# Patient Record
Sex: FEMALE | Race: WHITE | NOT HISPANIC OR LATINO | Employment: OTHER | URBAN - METROPOLITAN AREA
[De-identification: names, ages, dates, MRNs, and addresses within clinical notes are randomized per-mention and may not be internally consistent; named-entity substitution may affect disease eponyms.]

---

## 2017-10-11 ENCOUNTER — TRANSFERRED RECORDS (OUTPATIENT)
Dept: HEALTH INFORMATION MANAGEMENT | Facility: CLINIC | Age: 77
End: 2017-10-11

## 2017-10-11 ENCOUNTER — MEDICAL CORRESPONDENCE (OUTPATIENT)
Dept: HEALTH INFORMATION MANAGEMENT | Facility: CLINIC | Age: 77
End: 2017-10-11

## 2022-09-01 ENCOUNTER — OFFICE VISIT (OUTPATIENT)
Dept: FAMILY MEDICINE | Facility: CLINIC | Age: 82
End: 2022-09-01
Payer: COMMERCIAL

## 2022-09-01 VITALS — DIASTOLIC BLOOD PRESSURE: 66 MMHG | HEART RATE: 71 BPM | SYSTOLIC BLOOD PRESSURE: 118 MMHG | OXYGEN SATURATION: 94 %

## 2022-09-01 DIAGNOSIS — C55 ADENOCARCINOMA OF UTERUS (H): ICD-10-CM

## 2022-09-01 DIAGNOSIS — M48.061 SPINAL STENOSIS OF LUMBAR REGION, UNSPECIFIED WHETHER NEUROGENIC CLAUDICATION PRESENT: ICD-10-CM

## 2022-09-01 DIAGNOSIS — G89.29 CHRONIC MIDLINE LOW BACK PAIN WITH LEFT-SIDED SCIATICA: ICD-10-CM

## 2022-09-01 DIAGNOSIS — Z00.00 ENCOUNTER FOR MEDICAL EXAMINATION TO ESTABLISH CARE: Primary | ICD-10-CM

## 2022-09-01 DIAGNOSIS — E78.5 HYPERLIPIDEMIA LDL GOAL <130: ICD-10-CM

## 2022-09-01 DIAGNOSIS — G60.9 IDIOPATHIC PERIPHERAL NEUROPATHY: ICD-10-CM

## 2022-09-01 DIAGNOSIS — M62.838 MUSCLE SPASM: ICD-10-CM

## 2022-09-01 DIAGNOSIS — Z86.2 HISTORY OF ANEMIA: ICD-10-CM

## 2022-09-01 DIAGNOSIS — M54.42 CHRONIC MIDLINE LOW BACK PAIN WITH LEFT-SIDED SCIATICA: ICD-10-CM

## 2022-09-01 DIAGNOSIS — E03.9 HYPOTHYROIDISM, UNSPECIFIED TYPE: ICD-10-CM

## 2022-09-01 LAB
ALBUMIN SERPL BCG-MCNC: 3.7 G/DL (ref 3.5–5.2)
ALP SERPL-CCNC: 71 U/L (ref 35–104)
ALT SERPL W P-5'-P-CCNC: 12 U/L (ref 10–35)
ANION GAP SERPL CALCULATED.3IONS-SCNC: 9 MMOL/L (ref 7–15)
AST SERPL W P-5'-P-CCNC: 18 U/L (ref 10–35)
BILIRUB SERPL-MCNC: 0.2 MG/DL
BUN SERPL-MCNC: 20.1 MG/DL (ref 8–23)
CALCIUM SERPL-MCNC: 9.6 MG/DL (ref 8.8–10.2)
CHLORIDE SERPL-SCNC: 103 MMOL/L (ref 98–107)
CHOLEST SERPL-MCNC: 161 MG/DL
CREAT SERPL-MCNC: 0.98 MG/DL (ref 0.51–0.95)
DEPRECATED HCO3 PLAS-SCNC: 29 MMOL/L (ref 22–29)
ERYTHROCYTE [DISTWIDTH] IN BLOOD BY AUTOMATED COUNT: 12.6 % (ref 10–15)
GFR SERPL CREATININE-BSD FRML MDRD: 58 ML/MIN/1.73M2
GLUCOSE SERPL-MCNC: 106 MG/DL (ref 70–99)
HCT VFR BLD AUTO: 40.3 % (ref 35–47)
HDLC SERPL-MCNC: 54 MG/DL
HGB BLD-MCNC: 13.2 G/DL (ref 11.7–15.7)
LDLC SERPL CALC-MCNC: 76 MG/DL
MCH RBC QN AUTO: 33.2 PG (ref 26.5–33)
MCHC RBC AUTO-ENTMCNC: 32.8 G/DL (ref 31.5–36.5)
MCV RBC AUTO: 102 FL (ref 78–100)
NONHDLC SERPL-MCNC: 107 MG/DL
PLATELET # BLD AUTO: 185 10E3/UL (ref 150–450)
POTASSIUM SERPL-SCNC: 3.7 MMOL/L (ref 3.4–5.3)
PROT SERPL-MCNC: 6.4 G/DL (ref 6.4–8.3)
RBC # BLD AUTO: 3.97 10E6/UL (ref 3.8–5.2)
SODIUM SERPL-SCNC: 141 MMOL/L (ref 136–145)
TRIGL SERPL-MCNC: 154 MG/DL
TSH SERPL DL<=0.005 MIU/L-ACNC: 0.04 UIU/ML (ref 0.3–4.2)
WBC # BLD AUTO: 14 10E3/UL (ref 4–11)

## 2022-09-01 PROCEDURE — 80061 LIPID PANEL: CPT

## 2022-09-01 PROCEDURE — 99204 OFFICE O/P NEW MOD 45 MIN: CPT | Mod: GC

## 2022-09-01 PROCEDURE — 80053 COMPREHEN METABOLIC PANEL: CPT

## 2022-09-01 PROCEDURE — 85027 COMPLETE CBC AUTOMATED: CPT

## 2022-09-01 PROCEDURE — 84439 ASSAY OF FREE THYROXINE: CPT

## 2022-09-01 PROCEDURE — 84443 ASSAY THYROID STIM HORMONE: CPT

## 2022-09-01 PROCEDURE — 36415 COLL VENOUS BLD VENIPUNCTURE: CPT

## 2022-09-01 RX ORDER — LEVOTHYROXINE SODIUM 137 UG/1
137 TABLET ORAL DAILY
Qty: 30 TABLET | Refills: 1 | Status: SHIPPED | OUTPATIENT
Start: 2022-09-01 | End: 2022-11-03 | Stop reason: SINTOL

## 2022-09-01 RX ORDER — PRAVASTATIN SODIUM 80 MG/1
80 TABLET ORAL DAILY
Qty: 90 TABLET | Refills: 0 | Status: SHIPPED | OUTPATIENT
Start: 2022-09-01 | End: 2022-11-30

## 2022-09-01 RX ORDER — GABAPENTIN 300 MG/1
600 CAPSULE ORAL 2 TIMES DAILY
Qty: 120 CAPSULE | Refills: 1 | Status: SHIPPED | OUTPATIENT
Start: 2022-09-01 | End: 2022-11-03

## 2022-09-01 RX ORDER — HYDROCODONE BITARTRATE AND ACETAMINOPHEN 5; 325 MG/1; MG/1
1 TABLET ORAL 2 TIMES DAILY
Qty: 60 TABLET | Refills: 0 | Status: SHIPPED | OUTPATIENT
Start: 2022-09-01 | End: 2022-09-30

## 2022-09-01 NOTE — PROGRESS NOTES
Assessment & Plan   Zena is a 81 year old with a history significant for hypertension, hyperlipidemia, type 2 diabetes mellitus, adenocarcinoma of the uterus, spinal stenosis, chronic back pain, chronic opioid use.  Here to establish care.    Encounter for medical examination to establish care  Recently moved to [place].  Here to establish care.  Initially without any medical records in the chart.  Were able to find limited records through care everywhere.  Chronic medical conditions as below.  We will have to prescribe all her chronic medications as she is running out. History updated in the chart.  - Comprehensive metabolic panel  -Follow-up in 3 to 4 weeks for pain contract  -AUST[facility] filled unchanged.  -Continue to address chronic medical conditions once outside records received    Hypothyroidism, unspecified type  Unclear etiology.  Currently on Synthroid 137 mg daily.  No signs or symptoms of hyper/hypothyroidism.  Most recent thyroid studies were normal.  We will repeat today.  We will fill levothyroxine today and adjust dose as needed pending results.  - levothyroxine (SYNTHROID/LEVOTHROID) 137 MCG tablet  Dispense: 30 tablet; Refill: 1  - TSH with free T4 reflex  - T4 free    Spinal stenosis of lumbar region, unspecified whether neurogenic claudication present  Chronic midline low back pain with left-sided sciatica  Ongoing low back pain with sciatica to the left lower extremity.  Per chart review, history of spinal stenosis.  Unable to see any imaging or prior interventions.  Symptoms have been stable.  No red flag signs or symptoms.  Has been taking Norco 5-325 2 times daily for almost 20 years.  We will refill this today, however consider looking into other therapy options.  We will have her return to clinic for a pain contract.  - HYDROcodone-acetaminophen (NORCO) 5-325 MG tablet  Dispense: 60 tablet; Refill: 0  -Follow-up in 3 to 4 weeks to establish pain contract    Muscle spasm  Occasional muscle  spasms.  Using previously prescribed Zanaflex as needed.  Will order today.  - tiZANidine (ZANAFLEX) 4 MG tablet  Dispense: 30 tablet; Refill: 1    Idiopathic peripheral neuropathy  Ongoing lower extremity neuropathy.  Burning sensation.  Unable to find a clear etiology in the chart.  Does have some benefit with gabapentin 600 mg 2 times daily.  Will refill today.    - gabapentin (NEURONTIN) 300 MG capsule  Dispense: 120 capsule; Refill: 1    Hyperlipidemia LDL goal <130  Per chart review.  Prior lipid panel on 2/23/2022 with a total cholesterol of 174, HDL 56, LDL 84.  On pravastatin 80 mg daily.  Denies any history of CVA or CAD.  Will prescribe pravastatin and repeat lipid panel.  - pravastatin (PRAVACHOL) 80 MG tablet  Dispense: 90 tablet; Refill: 0  - Lipid Profile      History of anemia  Reports history of anemia.  Most recent CBC with normal hemoglobin.  Prior iron studies in the chart were normal.  No evidence of bleeding.  We will obtain a CBC today to evaluate.  - CBC with platelets    Adenocarcinoma of the uterus   Diagnosed about 5 years ago.  Status post hysterectomy per patient.  Did have regular follow-up, no recurrence of cancer.         No follow-ups on file.    Jamie Ritchie MD  [facility] Subjective Betty is a 81 year old with a history significant for hypertension tension, hyperlipidemia, type 2 diabetes mellitus, adenocarcinoma of the uterus, spinal stenosis, chronic back pain presenting for the following health issues:  New Patient (Overview moved back from [facility]//Complete hysterectomy June 17 2018/Neuropathy both feet/Knee surgery about 20 years ago) and Refill Request (Refill 4-5 meds Gabapentin 300mg, Pravastatin 80mg, Levohydroxezine, Hydrocodone, Tenzadine)      HPI   81-year-old female presents to establish.  Previously lived in [place] many years ago.  Lived in [place] for almost 40 years, returned home to be closer to family.  Received care at  [facility] in [place].      Hypothyroidism  Most recent thyroid studies on February 24, 2022 showing TSH of 0.026 and free T4 of 1.78    Adenocarcinoma of the uterus  Status post hysterectomy.  Reports she has been cancer free for 5 years.  Denies any need for further follow-up    Hyperlipidemia  Lipid panel on 2/23/2022 with a total cholesterol of 174, HDL 56, LDL 84.  Takes pravastatin 80 mg daily.  No history of heart disease or CVA.    History of anemia  Normal hemoglobin on 7/23/2022.  Normal iron studies.  She does not know the etiology of her anemia.    Chronic kidney disease stage III  Normal creatinine on February 23, 2022    Spinal stenosis of lumbar region  Long history of back pain.  Per chart review, diagnosed as lumbar spinal stenosis.  No imaging available.  Has been on hydrocodone for the past 20 years she reports.    Sciatic nerve pain   Left groin to anterior leg. Burning sensation. Has tried physical therapy without relief.  Able to ambulate without difficulty.        Past medical history  Hypertension  Hyperlipidemia  Chronic kidney disease stage III  Adenocarcinoma of the uterus  Lumbar spinal stenosis  Type 2 diabetes mellitus  Lower extremity neuropathy of unknown etiology      Social history  Previous smoker -50 pack year        Current medications per patient  Gabapentin neuropathy - 600am and 600 at night.   Pravastatin 80mg daily   Levothyroxine - 137mcg daily   Hydrocodone acetaminophen 5-325mg two times daily. - back pain   tezanidine 4mg as needed      Review of Systems   Constitutional, HEENT, cardiovascular, pulmonary, GI, , musculoskeletal, neuro, skin, endocrine and psych systems are negative, except as otherwise noted.      Objective    /66   Pulse 71   SpO2 94%   There is no height or weight on file to calculate BMI.  Physical Exam   GENERAL: healthy, alert and no distress  HEENT -mucous membranes moist.  No oral lesions.  No dentition.  Small excoriation  on the left outer ear canal.  No bleeding or drainage.  NECK: no adenopathy, no asymmetry, masses, and thyroid normal to palpation  RESP: lungs clear to auscultation - no rales, rhonchi or wheezes  CV: regular rate and rhythm, normal S1 S2, no S3 or S4, no murmur, click or rub, no peripheral edema and peripheral pulses strong  ABDOMEN: soft, nontender, no hepatosplenomegaly, no masses and bowel sounds normal  MS: no gross musculoskeletal defects noted, no edema

## 2022-09-01 NOTE — LETTER
September 8, 2022      Zena Emmanuel  [address] [place] [address]      Hi Zena,     I tried to call your phone, however your number was not in service.  You can call the clinic so we have your most up-to-date phone number.     Most of your blood levels are normal.  Your white blood cell count is slightly elevated at 14.  This can suggest underlying infection or inflammation.  You were not having any symptoms when I saw you, but please follow-up in clinic if you develop any symptoms of an infection.  We can recheck this at a later time.     Your hemoglobin was normal.  You do not have anemia.  Your red blood cells are little bit on the larger side, we can talk about this more at your upcoming visit.     Your thyroid levels are a little on the higher side.  Your TSH, or thyroid stimulating hormone, is low.  This means you are probably taking too high of a dose of Synthroid.  I would recommend calling to schedule appointment so we can discuss this further and make changes as needed.     Your cholesterol looks good.  Continue taking the pravastatin.     Your kidney function looks good overall.  Your creatinine, which is a measure of your kidney function, is slightly elevated.  Nothing further needs to be done at this time.  We can recheck it later.  Your electrolytes otherwise look good.       Resulted Orders   CBC with platelets   Result Value Ref Range    WBC Count 14.0 (H) 4.0 - 11.0 10e3/uL    RBC Count 3.97 3.80 - 5.20 10e6/uL    Hemoglobin 13.2 11.7 - 15.7 g/dL    Hematocrit 40.3 35.0 - 47.0 %     (H) 78 - 100 fL    MCH 33.2 (H) 26.5 - 33.0 pg    MCHC 32.8 31.5 - 36.5 g/dL    RDW 12.6 10.0 - 15.0 %    Platelet Count 185 150 - 450 10e3/uL   Comprehensive metabolic panel   Result Value Ref Range    Sodium 141 136 - 145 mmol/L    Potassium 3.7 3.4 - 5.3 mmol/L    Creatinine 0.98 (H) 0.51 - 0.95 mg/dL    Urea Nitrogen 20.1 8.0 - 23.0 mg/dL    Chloride 103 98 - 107 mmol/L    Carbon Dioxide (CO2)  29 22 - 29 mmol/L    Anion Gap 9 7 - 15 mmol/L    Glucose 106 (H) 70 - 99 mg/dL    Calcium 9.6 8.8 - 10.2 mg/dL    Protein Total 6.4 6.4 - 8.3 g/dL    Albumin 3.7 3.5 - 5.2 g/dL    Bilirubin Total 0.2 <=1.2 mg/dL    Alkaline Phosphatase 71 35 - 104 U/L    AST 18 10 - 35 U/L    ALT 12 10 - 35 U/L    GFR Estimate 58 (L) >60 mL/min/1.73m2      Comment:      Effective December 21, 2021 eGFRcr in adults is calculated using the 2021 CKD-EPI creatinine equation which includes age and gender (Trenton et al., NEJM, DOI: 10.1056/EQAPgv2914370)   Lipid Profile   Result Value Ref Range    Cholesterol 161 <200 mg/dL    Triglycerides 154 (H) <150 mg/dL    Direct Measure HDL 54 >=50 mg/dL    LDL Cholesterol Calculated 76 <=100 mg/dL    Non HDL Cholesterol 107 <130 mg/dL    Narrative    Cholesterol  Desirable:  <200 mg/dL    Triglycerides  Normal:  Less than 150 mg/dL  Borderline High:  150-199 mg/dL  High:  200-499 mg/dL  Very High:  Greater than or equal to 500 mg/dL    Direct Measure HDL  Female:  Greater than or equal to 50 mg/dL   Male:  Greater than or equal to 40 mg/dL    LDL Cholesterol  Desirable:  <100mg/dL  Above Desirable:  100-129 mg/dL   Borderline High:  130-159 mg/dL   High:  160-189 mg/dL   Very High:  >= 190 mg/dL    Non HDL Cholesterol  Desirable:  130 mg/dL  Above Desirable:  130-159 mg/dL  Borderline High:  160-189 mg/dL  High:  190-219 mg/dL  Very High:  Greater than or equal to 220 mg/dL   TSH with free T4 reflex   Result Value Ref Range    TSH 0.04 (L) 0.30 - 4.20 uIU/mL   T4 free   Result Value Ref Range    Free T4 1.67 0.90 - 1.70 ng/dL       Please call the clinic with any questions or concerns.     Thank you,     Dr. Ritchie

## 2022-09-02 PROBLEM — E11.9 TYPE 2 DIABETES MELLITUS (H): Status: ACTIVE | Noted: 2019-06-11

## 2022-09-02 PROBLEM — Z86.69 HISTORY OF CATARACT: Status: ACTIVE | Noted: 2020-02-26

## 2022-09-02 PROBLEM — N64.52 BLOODY DISCHARGE FROM RIGHT NIPPLE: Status: ACTIVE | Noted: 2018-06-27

## 2022-09-02 PROBLEM — M54.30 SCIATICA: Status: ACTIVE | Noted: 2017-12-19

## 2022-09-02 PROBLEM — M25.50 PAIN IN JOINT: Status: ACTIVE | Noted: 2018-10-09

## 2022-09-02 PROBLEM — E55.9 VITAMIN D DEFICIENCY: Status: ACTIVE | Noted: 2017-09-20

## 2022-09-02 PROBLEM — C55 ADENOCARCINOMA OF UTERUS (H): Status: ACTIVE | Noted: 2018-06-27

## 2022-09-02 PROBLEM — N18.30 CHRONIC KIDNEY DISEASE, STAGE III (MODERATE) (H): Status: ACTIVE | Noted: 2018-10-09

## 2022-09-02 LAB — T4 FREE SERPL-MCNC: 1.67 NG/DL (ref 0.9–1.7)

## 2022-09-08 NOTE — PROGRESS NOTES
Preceptor Attestation:   Patient seen, evaluated and discussed with the resident. I have verified the content of the note, which accurately reflects my assessment of the patient and the plan of care.  Supervising Physician:Morena Dillard DO [facility]

## 2022-09-30 ENCOUNTER — OFFICE VISIT (OUTPATIENT)
Dept: FAMILY MEDICINE | Facility: CLINIC | Age: 82
End: 2022-09-30
Payer: COMMERCIAL

## 2022-09-30 VITALS
DIASTOLIC BLOOD PRESSURE: 75 MMHG | SYSTOLIC BLOOD PRESSURE: 126 MMHG | BODY MASS INDEX: 40.62 KG/M2 | WEIGHT: 268 LBS | TEMPERATURE: 98.1 F | HEART RATE: 71 BPM | HEIGHT: 68 IN | OXYGEN SATURATION: 95 % | RESPIRATION RATE: 20 BRPM

## 2022-09-30 DIAGNOSIS — M54.42 CHRONIC MIDLINE LOW BACK PAIN WITH LEFT-SIDED SCIATICA: Primary | ICD-10-CM

## 2022-09-30 DIAGNOSIS — D72.829 LEUKOCYTOSIS, UNSPECIFIED TYPE: ICD-10-CM

## 2022-09-30 DIAGNOSIS — M62.838 MUSCLE SPASM: ICD-10-CM

## 2022-09-30 DIAGNOSIS — F11.90 CHRONIC, CONTINUOUS USE OF OPIOIDS: ICD-10-CM

## 2022-09-30 DIAGNOSIS — G89.29 CHRONIC MIDLINE LOW BACK PAIN WITH LEFT-SIDED SCIATICA: Primary | ICD-10-CM

## 2022-09-30 DIAGNOSIS — E66.01 MORBID OBESITY (H): ICD-10-CM

## 2022-09-30 LAB
BASOPHILS # BLD AUTO: 0 10E3/UL (ref 0–0.2)
BASOPHILS NFR BLD AUTO: 0 %
EOSINOPHIL # BLD AUTO: 0 10E3/UL (ref 0–0.7)
EOSINOPHIL NFR BLD AUTO: 0 %
ERYTHROCYTE [DISTWIDTH] IN BLOOD BY AUTOMATED COUNT: 13.1 % (ref 10–15)
HCT VFR BLD AUTO: 36.7 % (ref 35–47)
HGB BLD-MCNC: 12.2 G/DL (ref 11.7–15.7)
IMM GRANULOCYTES # BLD: 0 10E3/UL
IMM GRANULOCYTES NFR BLD: 0 %
LYMPHOCYTES # BLD AUTO: 3.7 10E3/UL (ref 0.8–5.3)
LYMPHOCYTES NFR BLD AUTO: 43 %
MCH RBC QN AUTO: 33.5 PG (ref 26.5–33)
MCHC RBC AUTO-ENTMCNC: 33.2 G/DL (ref 31.5–36.5)
MCV RBC AUTO: 101 FL (ref 78–100)
MONOCYTES # BLD AUTO: 1.7 10E3/UL (ref 0–1.3)
MONOCYTES NFR BLD AUTO: 19 %
NEUTROPHILS # BLD AUTO: 3.2 10E3/UL (ref 1.6–8.3)
NEUTROPHILS NFR BLD AUTO: 37 %
PLATELET # BLD AUTO: 180 10E3/UL (ref 150–450)
RBC # BLD AUTO: 3.64 10E6/UL (ref 3.8–5.2)
WBC # BLD AUTO: 8.6 10E3/UL (ref 4–11)

## 2022-09-30 PROCEDURE — 99214 OFFICE O/P EST MOD 30 MIN: CPT | Mod: GC

## 2022-09-30 PROCEDURE — 36415 COLL VENOUS BLD VENIPUNCTURE: CPT

## 2022-09-30 PROCEDURE — 85025 COMPLETE CBC W/AUTO DIFF WBC: CPT

## 2022-09-30 RX ORDER — HYDROCODONE BITARTRATE AND ACETAMINOPHEN 5; 325 MG/1; MG/1
1 TABLET ORAL 2 TIMES DAILY
Qty: 60 TABLET | Refills: 0 | Status: SHIPPED | OUTPATIENT
Start: 2022-09-30 | End: 2022-11-03

## 2022-09-30 RX ORDER — LIDOCAINE 50 MG/G
OINTMENT TOPICAL ONCE
Status: DISCONTINUED | OUTPATIENT
Start: 2022-09-30 | End: 2022-09-30

## 2022-09-30 RX ORDER — LIDOCAINE 50 MG/G
OINTMENT TOPICAL DAILY
Qty: 50 G | Refills: 1 | Status: SHIPPED | OUTPATIENT
Start: 2022-09-30 | End: 2023-01-27

## 2022-09-30 NOTE — PATIENT INSTRUCTIONS
Chronic Pain Management Visit After Visit Summary     Goals of Chronic Pain therapy:   Increase function   Decrease suffering   This process may not relieve pain            OPIOID OVERDOSE SAFETY PLAN  Patients taking prescription opioids are at risk for accidental overdose. Overdose from prescription opioid pain medications is a national epidemic. Opioids include: Vicodin (hydrocodone), OxyContin (oxycodone), Dilaudid (hydromorphone), MS Contin (morphine), Fentanyl, Percocet, Methadone, Suboxone, heroin, and others.    Steps to Avoid Overdose  1. Only take medication prescribed to you  2. Don t take more medication than instructed  3. NEVER mix pain medications with alcohol  4. Avoid sleeping pills when taking pain medications  5. Dispose of unused medications  6. Store your medication in a secure place  7. Teach your family and friends how to respond to an overdose      Narcan is being prescribed as part of your opioid overdose safety plan. Narcan is a medication that reverses opioid overdose and saves lives. Opioid overdoses are life threatening and must be handled right away. Narcan reverses overdose for 30-90 minutes, and you must call 911 immediately if you suspect overdose.     STEP 1: RECOGNIZE OVERDOSE  Not breathing or breathing very slowly (less than 1 breath every 5 seconds)  Snoring, gasping, or gurgling sounds  Lips or fingertips turning blue  Very limp body and pale face  Not responding to hard rub of the chest or yelling their name     STEP 2: CALL FOR HELP (DIAL 911)  Always call 911 and tell them  someone is not breathing   You are legally protected when calling for help in [place]     STEP 3: SUPPORT BREATHING  1. Check airway - make sure there is nothing inside their mouth stopping breathing  2. One hand on chin, tilt head back, pinch nose closed  3. Make a seal over mouth and give 2 slow breaths. You should see the chest rise, not stomach.  4. Keep going with one breath every 5 seconds      STEP 4: GIVE NARCAN  Give Narcan if you can give it quickly enough so that the person won t go for too long without your breathing assistance  Follow directions on the package  Spray Narcan into one nostril     STEP 5: MONITOR  Continue rescue breathing until they are breathing on their own  Give another Narcan spray if they are not breathing on their own or still unresponsive within 3 minutes of the first spray  Narcan wears off within 30-90 minutes and the person can overdose again once it wears off because the opioids are still in their system! Be sure to get them medical care right away.      Follow up in 2-4 weeks.    Important Reminders about Opioid Medications  Opioid Medications are only prescribed during a clinic visit  Keep them locked up-we will not replace lost or stolen opioid medications   Bring all your opioid medications in their original bottles to each appointment   Thank you for coming to Clinic today.  Lab Testing:  f you had lab testing today and your results are reassuring or normal they will be mailed to you or sent through [employer] within 7 days.   If the lab tests need quick action we will call you with the results.  The phone number we will call with results is # [phone] (home) . If this is not the best number please call our clinic and change the number.   Medication Refills:  If you need any refills please call your pharmacy and they will contact us. Opioid refills will only be refilled at visits, any lost opioid prescriptions will not be replaced.  If you need to  your refill at a new pharmacy, please contact the new pharmacy directly. The new pharmacy will help you get your medications transferred faster.   Scheduling:  If you have any concerns about today's visit or wish to schedule another appointment please call our office during normal business hours   (8-5:00 M-F)    Medical Concerns:  If you have urgent medical concerns please call [phone] at any time of the  day.  If you have a medical emergency please call 911.  Again thank you for choosing [facility] [address] [place] [id number] [phone]  and please let us know how we can best partner with you to improve you and your family's health.

## 2022-09-30 NOTE — LETTER
Opioid / Opioid Plus Controlled Substance Agreement    This is an agreement between you and your provider about the safe and appropriate use of controlled substance/opioids prescribed by your care team. Controlled substances are medicines that can cause physical and mental dependence (abuse).    There are strict laws about having and using these medicines. We here at [facility] are committing to working with you in your efforts to get better. To support you in this work, we ll help you schedule regular office appointments for medicine refills. If we must cancel or change your appointment for any reason, we ll make sure you have enough medicine to last until your next appointment.     As a Provider, I will:    Listen carefully to your concerns and treat you with respect.     Recommend a treatment plan that I believe is in your best interest. This plan may involve therapies other than opioid pain medication.     Talk with you often about the possible benefits, and the risk of harm of any medicine that we prescribe for you.     Provide a plan on how to taper (discontinue or go off) using this medicine if the decision is made to stop its use.    As a Patient, I understand that opioid(s):     Are a controlled substance prescribed by my care team to help me function or work and manage my condition(s).     Are strong medicines and can cause serious side effects such as:    Drowsiness, which can seriously affect my driving ability    A lower breathing rate, enough to cause death    Harm to my thinking ability     Depression     Abuse of and addiction to this medicine    Need to be taken exactly as prescribed. Combining opioids with certain medicines or chemicals (such as illegal drugs, sedatives, sleeping pills, and benzodiazepines) can be dangerous or even fatal. If I stop opioids suddenly, I may have severe withdrawal symptoms.    Do not work for all types of pain nor for all patients. If they re not helpful, I may  be asked to stop them.        The risks, benefits and side effects of these medicine(s) were explained to me. I agree that:  1. I will take part in other treatments as advised by my care team. This may be psychiatry or counseling, physical therapy, behavioral therapy, group treatment or a referral to a specialist.     2. I will keep all my appointments. I understand that this is part of the monitoring of opioids. My care team may require an office visit for EVERY opioid/controlled substance refill. If I miss appointments or don t follow instructions, my care team may stop my medicine.    3. I will take my medicines as prescribed. I will not change the dose or schedule unless my care team tells me to. There will be no refills if I run out early.     4. I may be asked to come to the clinic and complete a urine drug test or complete a pill count at any time. If I don t give a urine sample or participate in a pill count, the care team may stop my medicine.    5. I will only receive prescriptions from this clinic for chronic pain. If I am treated by another provider for acute pain issues, I will tell them that I am taking opioid pain medication for chronic pain and that I have a treatment agreement with this provider. I will inform my [facility] care team within one business day if I am given a prescription for any pain medication by another healthcare provider. My [facility] care team can contact other providers and pharmacists about my use of any medicines.    6. It is up to me to make sure that I don t run out of my medicines on weekends or holidays. If my care team is willing to refill my opioid prescription without a visit, I must request refills only during office hours. Refills may take up to 3 business days to process. I will use one pharmacy to fill all my opioid and other controlled substance prescriptions. I will notify the clinic about any changes to my insurance or medication  availability.    7. I am responsible for my prescriptions. If the medicine/prescription is lost, stolen or destroyed, it will not be replaced. I also agree not to share controlled substance medicines with anyone.    8. I am aware I should not use any illegal or recreational drugs. I agree not to drink alcohol unless my care team says I can.       9. If I enroll in the [place] Medical Cannabis program, I will tell my care team prior to my next refill.     10. I will tell my care team right away if I become pregnant, have a new medical problem treated outside of my regular clinic, or have a change in my medications.    11. I understand that this medicine can affect my thinking, judgment and reaction time. Alcohol and drugs affect the brain and body, which can affect the safety of my driving. Being under the influence of alcohol or drugs can affect my decision-making, behaviors, personal safety, and the safety of others. Driving while impaired (DWI) can occur if a person is driving, operating, or in physical control of a car, motorcycle, boat, snowmobile, ATV, motorbike, off-road vehicle, or any other motor vehicle ([place] Statute 169A.20). I understand the risk if I choose to drive or operate any vehicle or machinery.    I understand that if I do not follow any of the conditions above, my prescriptions or treatment may be stopped or changed.          Opioids  What You Need to Know    What are opioids?   Opioids are pain medicines that must be prescribed by a doctor. They are also known as narcotics.     Examples are:   1. morphine (MS Contin, Soledad)  2. oxycodone (Oxycontin)  3. oxycodone and acetaminophen (Percocet)  4. hydrocodone and acetaminophen (Vicodin, Norco)   5. fentanyl patch (Duragesic)   6. hydromorphone (Dilaudid)   7. methadone  8. codeine (Tylenol #3)     What do opioids do well?   Opioids are best for severe short-term pain such as after a surgery or injury. They may work well for cancer pain. They may  help some people with long-lasting (chronic) pain.     What do opioids NOT do well?   Opioids never get rid of pain entirely, and they don t work well for most patients with chronic pain. Opioids don t reduce swelling, one of the causes of pain.                                    Other ways to manage chronic pain and improve function include:       Treat the health problem that may be causing pain    Anti-inflammation medicines, which reduce swelling and tenderness, such as ibuprofen (Advil, Motrin) or naproxen (Aleve)    Acetaminophen (Tylenol)    Antidepressants and anti-seizure medicines, especially for nerve pain    Topical treatments such as patches or creams    Injections or nerve blocks    Chiropractic or osteopathic treatment    Acupuncture, massage, deep breathing, meditation, visual imagery, aromatherapy    Use heat or ice at the pain site    Physical therapy     Exercise    Stop smoking    Take part in therapy       Risks and side effects     Talk to your doctor before you start or decide to keep taking opioids. Possible side effects include:      Lowering your breathing rate enough to cause death    Overdose, including death, especially if taking higher than prescribed doses    Worse depression symptoms; less pleasure in things you usually enjoy    Feeling tired or sluggish    Slower thoughts or cloudy thinking    Being more sensitive to pain over time; pain is harder to control    Trouble sleeping or restless sleep    Changes in hormone levels (for example, less testosterone)    Changes in sex drive or ability to have sex    Constipation    Unsafe driving    Itching and sweating    Dizziness    Nausea, throwing up and dry mouth    What else should I know about opioids?    Opioids may lead to dependence, tolerance, or addiction.      Dependence means that if you stop or reduce the medicine too quickly, you will have withdrawal symptoms. These include loose poop (diarrhea), jitters, flu-like symptoms,  nervousness and tremors. Dependence is not the same as addiction.                       Tolerance means needing higher doses over time to get the same effect. This may increase the chance of serious side effects.      Addiction is when people improperly use a substance that harms their body, their mind or their relations with others. Use of opiates can cause a relapse of addiction if you have a history of drug or alcohol abuse.      People who have used opioids for a long time may have a lower quality of life, worse depression, higher levels of pain and more visits to doctors.    You can overdose on opioids. Take these steps to lower your risk of overdose:    1. Recognize the signs:  Signs of overdose include decrease or loss of consciousness (blackout), slowed breathing, trouble waking up and blue lips. If someone is worried about overdose, they should call 911.    2. Talk to your doctor about Narcan (naloxone).   If you are at risk for overdose, you may be given a prescription for Narcan. This medicine very quickly reverses the effects of opioids.   If you overdose, a friend or family member can give you Narcan while waiting for the ambulance. They need to know the signs of overdose and how to give Narcan.     3. Don't use alcohol or street drugs.   Taking them with opioids can cause death.    4. Do not take any of these medicines unless your doctor says it s OK. Taking these with opioids can cause death:    Benzodiazepines, such as lorazepam (Ativan), alprazolam (Xanax) or diazepam (Valium)    Muscle relaxers, such as cyclobenzaprine (Flexeril)    Sleeping pills like zolpidem (Ambien)     Other opioids      How to keep you and other people safe while taking opioids:    1. Never share your opioids with others.  Opioid medicines are regulated by the Drug Enforcement Agency ([place]). Selling or sharing medications is a criminal act.    2. Be sure to store opioids in a secure place, locked up if possible. Young children  can easily swallow them and overdose.    3. When you are traveling with your medicines, keep them in the original bottles. If you use a pill box, be sure you also carry a copy of your medicine list from your clinic or pharmacy.    4. Safe disposal of opioids    Most pharmacies have places to get rid of medicine, called disposal kiosks. Medicine disposal options are also available in every [facility]. Search your county and  medication disposal  to find more options. You can find more details at:  [email or web address]     I agree that my provider, clinic care team, and pharmacy may work with any city, state or federal law enforcement agency that investigates the misuse, sale, or other diversion of my controlled medicine. I will allow my provider to discuss my care with, or share a copy of, this agreement with any other treating provider, pharmacy or emergency room where I receive care.    I have read this agreement and have asked questions about anything I did not understand.    _______________________________________________________  Patient Signature - Zena Emmanuel _____________________                   Date     _______________________________________________________  Provider Signature - Jamie Ritchie MD   _____________________                   Date     _______________________________________________________  Witness Signature (required if provider not present while patient signing)   _____________________                   Date

## 2022-09-30 NOTE — PROGRESS NOTES
Preceptor Attestation:   Patient seen, evaluated and discussed with the resident Dr. Ritchie. I have verified the content of the note, which accurately reflects my assessment of the patient and the plan of care.    Long term use of opioids prior to establishing with this clinic. Will continue to discuss decreasing/alternatives.     Supervising Physician:Benjamin Rosenstein, MD, MA  [facility]

## 2022-09-30 NOTE — PROGRESS NOTES
Assessment & Plan   Zena is a 81 year old with history sent for hypertension, hyperlipidemia, type 2 diabetes mellitus, adenocarcinoma of the uterus, spinal stenosis, chronic back pain with chronic opioid use., presenting for the following health issues:    Chronic midline low back pain with left-sided sciatica  Ongoing low back pain.  Etiology not completely clear, could be related to spinal stenosis.  Has tried physical therapy in the past, but however willing to try again.  We will also try some topical lidocaine and see if we can get the pain under better control.  Overall goal is to reduce Norco use if able.  Can consider referral to spine clinic if symptoms persist.  We will continue with tizanidine as well, does use it sparingly.  Recommended avoiding administration with Norco due to sedation, which she has experienced in the past.  - tiZANidine (ZANAFLEX) 4 MG tablet  Dispense: 30 tablet; Refill: 1  - Physical Therapy Referral  - HYDROcodone-acetaminophen (NORCO) 5-325 MG tablet  Dispense: 60 tablet; Refill: 0  - lidocaine (XYLOCAINE) 5 % external ointment  Dispense: 50 g; Refill: 1  -Follow-up in 1 month.    Chronic, continuous use of opioids  20-year history of opioid use.  Currently on Norco 5 mg twice daily.  No improvement as above.  Chronic pain contract reviewed and signed by myself and patient today.  Counseled on opioid side effects.  Will order naloxone today.  - naloxone (NARCAN) 4 MG/0.1ML nasal spray  Dispense: 0.2 mL; Refill: 0    Muscle spasm  Will refill today.  Only uses a few times a month.  Counseled on sedating side effects  - tiZANidine (ZANAFLEX) 4 MG tablet  Dispense: 30 tablet; Refill: 1    Leukocytosis, unspecified type  Noted at prior visit.  No evidence of infection at that time.  We will recheck a CBC today with differential.  - CBC with Platelets & Differential      Morbid obesity (H)  Briefly discussed healthy lifestyle modifications.  Elevated BM could be contributing to  chronic low back pain.  Interventions as above.  We will continue to follow          No follow-ups on file.    Jamie Ritchie MD  [facility] [place] Subjective Betty is a 81 year old with history sent for hypertension, hyperlipidemia, type 2 diabetes mellitus, adenocarcinoma of the uterus, spinal stenosis, chronic back pain with chronic opioid use., presenting for the following health issues:  RECHECK (Sciatica issues, pain management issue,)      HPI     Pain History:  When did you first notice your pain? - Chronic Pain   Have you seen this provider for your pain in the past? No   Where in your body do your have pain? Low back.   Are you seeing anyone else for your pain? No  What makes your pain better? Resting  What makes your pain worse? Standing for short periods  How has pain affected your ability to work? Not currently working - unrelated to pain  Who lives in your household? Niece    Chronic Pain - Initial Assessment:    How would you describe your pain? Sharp stabbing pain 10/10.  Worse after standing/walking  Have you had any recent changes to the severity or character of your pain? Has been stable  Is there an underlying cause that has been identified? Spinal stenosis of the lumbar region   Has your ability to work or do daily activities changed recently because of your pain?  Yes, does have difficulty completing ADLs that require standing for periods of time.  Which of these pain treatments have you tried? Activity or exercise, Cold, Heat and Physical Therapy.  Is interested in trying physical therapy again.  Previous medication treatments:  Opiates - hydrocodone (Vicodin/Norco)  Muscle relaxants - tizanidine (Zanaflex)  Topicals - lidocaine (Lidoderm)      Review of Systems   Constitutional, HEENT, cardiovascular, pulmonary, gi and gu systems are negative, except as otherwise noted.      Objective    /75   Pulse 71   Temp 98.1  F (36.7  C) (Oral)   Resp 20   Ht  "1.727 m (5' 8\")   Wt 121.6 kg (268 lb)   SpO2 95%   BMI 40.75 kg/m    Body mass index is 40.75 kg/m .  Physical Exam   GENERAL: healthy, alert and no distress  RESP: Comfortable respiratory effort.  CV: No cyanosis.  Extremities warm and well perfused.  MS: no gross musculoskeletal defects noted, no edema.            "

## 2022-10-02 PROBLEM — E66.01 MORBID OBESITY (H): Status: ACTIVE | Noted: 2022-10-02

## 2022-10-04 DIAGNOSIS — R71.8 ELEVATED MCV: Primary | ICD-10-CM

## 2022-10-23 ENCOUNTER — HEALTH MAINTENANCE LETTER (OUTPATIENT)
Age: 82
End: 2022-10-23

## 2022-10-31 DIAGNOSIS — E03.9 HYPOTHYROIDISM, UNSPECIFIED TYPE: ICD-10-CM

## 2022-10-31 DIAGNOSIS — G60.9 IDIOPATHIC PERIPHERAL NEUROPATHY: ICD-10-CM

## 2022-11-01 NOTE — PROGRESS NOTES
"  Assessment & Plan   Zena is a 81 year old with history significant for hypertension, hyperlipidemia, type 2 diabetes mellitus, adenocarcinoma of the uterus, spinal stenosis, chronic back pain with chronic opioid use., presenting for the following health issues:    Chronic midline low back pain with left-sided sciatica  Chronic pain syndrome  Chronic low back pain with 20+ year history of opioid use.  Symptoms exacerbated by standing, walking for long periods of time.  Using assistive device with ambulation.  Symptoms have been stable.  Referral to physical therapy made, recommended scheduling appointment.  Will check urine drug screen today.  We will also increase his gabapentin with ongoing neuropathy.  - Urine Drugs of Abuse Screen Panel 13  - Urine Drugs of Abuse Screen Panel 13  - gabapentin (NEURONTIN) 300 MG capsule  Dispense: 90 capsule; Refill: 1  - HYDROcodone-acetaminophen (NORCO) 5-325 MG tablet  Dispense: 60 tablet; Refill: 0  -In a Parking sticker paperwork filled out.    Hypothyroidism, unspecified type  History of hypothyroidism.  Most recent TSH in clinic measuring 0.04 with a free T4 of 1.67.  Currently on 137 MCG levothyroxine.  TSH is suggestive of medication.  No signs or symptoms of hyperthyroidism.  We will cut back to 125 MCG with follow-up in 4 to 6 weeks.  - levothyroxine (SYNTHROID/LEVOTHROID) 125 MCG tablet  Dispense: 30 tablet; Refill: 1    Elevated MCV  Noted on CBC.  No history of alcohol use.  Does report history of B12 deficiency, previously requiring injections.  We will check a B12 and folate today.  Supplement as needed.  - Vitamin B12  - Folate         BMI:   Estimated body mass index is 40.6 kg/m  as calculated from the following:    Height as of this encounter: 1.727 m (5' 8\").    Weight as of this encounter: 121.1 kg (267 lb).   Weight management plan: Discussed healthy diet and exercise guidelines      No follow-ups on file.    Jamie Ritchie MD  [facility] " [facility] [place]    Adolfo Freitas is a 81 year old with history sent for hypertension, hyperlipidemia, type 2 diabetes mellitus, adenocarcinoma of the uterus, spinal stenosis, chronic back pain with chronic opioid use., presenting for the following health issues:  RECHECK (Pain medication. Also needs handicap sticker complete too) and Medication Reconciliation (Completed)      HPI       Hypothyroidism  Tsh on the lower side at last check. No signs or symptoms of hyperthyroidism. Per patient report, her prior physician was titrating down her levothyroxine    Chronic pain   Pain, hips and knees. Worse with standing. Pain has been okay, a little bit worse with the cold weather. Physical therapy referral placed at last visit, has not started PT. Stable on pain medications. Using zanaflex as needed for muscle spasms in the leg.     Macrocytosis  History of B12 deficiency per patient. Elevated MCV at last check. No anemia. No history of alcohol use.     Hypertension   120-130/80's at home         Review of Systems   Constitutional, HEENT, cardiovascular, pulmonary, gi and gu systems are negative, except as otherwise noted.      Objective    BP (!) 144/76   Pulse 75   Temp 98.4  F (36.9  C)   Resp 22   Ht 1.727 m (5' 8\")   Wt 121.1 kg (267 lb)   SpO2 92%   BMI 40.60 kg/m    Body mass index is 40.6 kg/m .  Physical Exam   Constitutional: Conversant, well developed. No acute distress  Eyes: Anicteric sclerae, no lid lag  Respiratory: Normal respiratory effort  Cardiovascular: No peripheral edema  Skin: No rash, lesion, or ulcer  Musculoskeletal: No digital cyanosis.  Some lower extremity edema.  Psych: Intact judgment and insight. Alert and oriented x3. Cordial affect         "

## 2022-11-03 ENCOUNTER — OFFICE VISIT (OUTPATIENT)
Dept: FAMILY MEDICINE | Facility: CLINIC | Age: 82
End: 2022-11-03
Payer: COMMERCIAL

## 2022-11-03 VITALS
BODY MASS INDEX: 40.47 KG/M2 | HEART RATE: 75 BPM | OXYGEN SATURATION: 92 % | TEMPERATURE: 98.4 F | DIASTOLIC BLOOD PRESSURE: 76 MMHG | HEIGHT: 68 IN | RESPIRATION RATE: 22 BRPM | SYSTOLIC BLOOD PRESSURE: 144 MMHG | WEIGHT: 267 LBS

## 2022-11-03 DIAGNOSIS — G89.29 CHRONIC MIDLINE LOW BACK PAIN WITH LEFT-SIDED SCIATICA: ICD-10-CM

## 2022-11-03 DIAGNOSIS — R71.8 ELEVATED MCV: ICD-10-CM

## 2022-11-03 DIAGNOSIS — M54.42 CHRONIC MIDLINE LOW BACK PAIN WITH LEFT-SIDED SCIATICA: ICD-10-CM

## 2022-11-03 DIAGNOSIS — E03.9 HYPOTHYROIDISM, UNSPECIFIED TYPE: ICD-10-CM

## 2022-11-03 DIAGNOSIS — G89.4 CHRONIC PAIN SYNDROME: Primary | ICD-10-CM

## 2022-11-03 LAB
AMPHETAMINES UR QL: NOT DETECTED
BARBITURATES UR QL SCN: NOT DETECTED
BENZODIAZ UR QL SCN: NOT DETECTED
BUPRENORPHINE UR QL: NOT DETECTED
CANNABINOIDS UR QL: NOT DETECTED
COCAINE UR QL SCN: NOT DETECTED
D-METHAMPHET UR QL: NOT DETECTED
METHADONE UR QL SCN: NOT DETECTED
OPIATES UR QL SCN: DETECTED
OXYCODONE UR QL SCN: NOT DETECTED
PCP UR QL SCN: NOT DETECTED
PROPOXYPH UR QL: NOT DETECTED
TRICYCLICS UR QL SCN: NOT DETECTED

## 2022-11-03 PROCEDURE — 36415 COLL VENOUS BLD VENIPUNCTURE: CPT

## 2022-11-03 PROCEDURE — 82607 VITAMIN B-12: CPT

## 2022-11-03 PROCEDURE — 82746 ASSAY OF FOLIC ACID SERUM: CPT

## 2022-11-03 PROCEDURE — 80306 DRUG TEST PRSMV INSTRMNT: CPT

## 2022-11-03 PROCEDURE — 99214 OFFICE O/P EST MOD 30 MIN: CPT | Mod: GC

## 2022-11-03 RX ORDER — LEVOTHYROXINE SODIUM 125 UG/1
125 TABLET ORAL DAILY
Qty: 30 TABLET | Refills: 1 | Status: SHIPPED | OUTPATIENT
Start: 2022-11-03 | End: 2022-12-30

## 2022-11-03 RX ORDER — LEVOTHYROXINE SODIUM 125 UG/1
125 TABLET ORAL DAILY
Qty: 90 TABLET | Refills: 0 | Status: CANCELLED | OUTPATIENT
Start: 2022-11-03

## 2022-11-03 RX ORDER — HYDROCODONE BITARTRATE AND ACETAMINOPHEN 5; 325 MG/1; MG/1
1 TABLET ORAL 2 TIMES DAILY
Qty: 60 TABLET | Refills: 0 | Status: CANCELLED | OUTPATIENT
Start: 2022-11-03

## 2022-11-03 RX ORDER — GABAPENTIN 300 MG/1
600 CAPSULE ORAL EVERY 8 HOURS PRN
Qty: 90 CAPSULE | Refills: 1 | Status: SHIPPED | OUTPATIENT
Start: 2022-11-03 | End: 2022-11-10

## 2022-11-03 RX ORDER — HYDROCODONE BITARTRATE AND ACETAMINOPHEN 5; 325 MG/1; MG/1
1 TABLET ORAL 2 TIMES DAILY
Qty: 60 TABLET | Refills: 0 | Status: SHIPPED | OUTPATIENT
Start: 2022-11-03 | End: 2022-12-06

## 2022-11-03 NOTE — PROGRESS NOTES
Preceptor Attestation:   Patient seen, evaluated and discussed with the resident. I have verified the content of the note, which accurately reflects my assessment of the patient and the plan of care.  Supervising Physician:Onesimo Cosby MD  [facility]

## 2022-11-04 LAB
FOLATE SERPL-MCNC: 15.3 NG/ML (ref 4.6–34.8)
VIT B12 SERPL-MCNC: 239 PG/ML (ref 232–1245)

## 2022-11-10 DIAGNOSIS — G89.4 CHRONIC PAIN SYNDROME: ICD-10-CM

## 2022-11-10 RX ORDER — GABAPENTIN 300 MG/1
600 CAPSULE ORAL EVERY 8 HOURS PRN
Qty: 180 CAPSULE | Refills: 2 | Status: SHIPPED | OUTPATIENT
Start: 2022-11-10 | End: 2023-02-06

## 2022-11-11 NOTE — PROGRESS NOTES
Reordered gabapentin to provide 30 day supply with refills.     Jamie Ritchie MD PGY-2  [facility]

## 2022-11-30 DIAGNOSIS — E78.5 HYPERLIPIDEMIA LDL GOAL <130: ICD-10-CM

## 2022-11-30 RX ORDER — PRAVASTATIN SODIUM 80 MG/1
80 TABLET ORAL DAILY
Qty: 90 TABLET | Refills: 2 | Status: SHIPPED | OUTPATIENT
Start: 2022-11-30 | End: 2023-10-04

## 2022-11-30 RX ORDER — LEVOTHYROXINE SODIUM 137 UG/1
137 TABLET ORAL DAILY
Qty: 30 TABLET | OUTPATIENT
Start: 2022-11-30

## 2022-11-30 RX ORDER — GABAPENTIN 300 MG/1
600 CAPSULE ORAL 2 TIMES DAILY
Qty: 120 CAPSULE | OUTPATIENT
Start: 2022-11-30

## 2022-12-30 DIAGNOSIS — E03.9 HYPOTHYROIDISM, UNSPECIFIED TYPE: ICD-10-CM

## 2022-12-30 RX ORDER — LEVOTHYROXINE SODIUM 125 UG/1
125 TABLET ORAL DAILY
Qty: 90 TABLET | Refills: 1 | Status: SHIPPED | OUTPATIENT
Start: 2022-12-30 | End: 2023-07-10

## 2023-01-27 DIAGNOSIS — M54.42 CHRONIC MIDLINE LOW BACK PAIN WITH LEFT-SIDED SCIATICA: ICD-10-CM

## 2023-01-27 DIAGNOSIS — G89.29 CHRONIC MIDLINE LOW BACK PAIN WITH LEFT-SIDED SCIATICA: ICD-10-CM

## 2023-01-27 RX ORDER — LIDOCAINE 50 MG/G
OINTMENT TOPICAL
Qty: 50 G | Refills: 1 | Status: SHIPPED | OUTPATIENT
Start: 2023-01-27 | End: 2023-05-15

## 2023-02-06 DIAGNOSIS — G89.4 CHRONIC PAIN SYNDROME: ICD-10-CM

## 2023-02-07 RX ORDER — GABAPENTIN 300 MG/1
600 CAPSULE ORAL EVERY 8 HOURS PRN
Qty: 180 CAPSULE | Refills: 1 | Status: SHIPPED | OUTPATIENT
Start: 2023-02-07 | End: 2023-04-06

## 2023-02-28 DIAGNOSIS — G89.29 CHRONIC MIDLINE LOW BACK PAIN WITH LEFT-SIDED SCIATICA: ICD-10-CM

## 2023-02-28 DIAGNOSIS — M62.838 MUSCLE SPASM: ICD-10-CM

## 2023-02-28 DIAGNOSIS — M54.42 CHRONIC MIDLINE LOW BACK PAIN WITH LEFT-SIDED SCIATICA: ICD-10-CM

## 2023-03-07 ENCOUNTER — MYC REFILL (OUTPATIENT)
Dept: FAMILY MEDICINE | Facility: CLINIC | Age: 83
End: 2023-03-07
Payer: COMMERCIAL

## 2023-03-07 DIAGNOSIS — G89.29 CHRONIC MIDLINE LOW BACK PAIN WITH LEFT-SIDED SCIATICA: ICD-10-CM

## 2023-03-07 DIAGNOSIS — M54.42 CHRONIC MIDLINE LOW BACK PAIN WITH LEFT-SIDED SCIATICA: ICD-10-CM

## 2023-03-07 RX ORDER — HYDROCODONE BITARTRATE AND ACETAMINOPHEN 5; 325 MG/1; MG/1
1 TABLET ORAL 2 TIMES DAILY
Qty: 60 TABLET | Refills: 0 | Status: SHIPPED | OUTPATIENT
Start: 2023-03-07 | End: 2023-04-06

## 2023-03-14 ENCOUNTER — OFFICE VISIT (OUTPATIENT)
Dept: FAMILY MEDICINE | Facility: CLINIC | Age: 83
End: 2023-03-14
Payer: COMMERCIAL

## 2023-03-14 VITALS
HEART RATE: 84 BPM | SYSTOLIC BLOOD PRESSURE: 125 MMHG | TEMPERATURE: 98.1 F | RESPIRATION RATE: 22 BRPM | HEIGHT: 68 IN | BODY MASS INDEX: 40.62 KG/M2 | DIASTOLIC BLOOD PRESSURE: 78 MMHG | WEIGHT: 268 LBS | OXYGEN SATURATION: 93 %

## 2023-03-14 DIAGNOSIS — G89.29 CHRONIC PAIN OF LEFT KNEE: ICD-10-CM

## 2023-03-14 DIAGNOSIS — G89.29 CHRONIC MIDLINE LOW BACK PAIN WITH LEFT-SIDED SCIATICA: Primary | ICD-10-CM

## 2023-03-14 DIAGNOSIS — F11.90 CHRONIC, CONTINUOUS USE OF OPIOIDS: ICD-10-CM

## 2023-03-14 DIAGNOSIS — M54.42 CHRONIC MIDLINE LOW BACK PAIN WITH LEFT-SIDED SCIATICA: Primary | ICD-10-CM

## 2023-03-14 DIAGNOSIS — M25.562 CHRONIC PAIN OF LEFT KNEE: ICD-10-CM

## 2023-03-14 PROCEDURE — 99214 OFFICE O/P EST MOD 30 MIN: CPT | Mod: GC

## 2023-03-14 ASSESSMENT — PAIN SCALES - GENERAL: PAINLEVEL: WORST PAIN (10)

## 2023-03-14 NOTE — PROGRESS NOTES
Preceptor Attestation:   Patient seen, evaluated and discussed with the resident. I have verified the content of the note, which accurately reflects my assessment of the patient and the plan of care.  Supervising Physician:Chani Dejesus MD  [facility]

## 2023-03-14 NOTE — PROGRESS NOTES
Assessment & Plan   82-year-old female with history significant for hypertension, hyperlipidemia, spinal stenosis, chronic pain syndrome with chronic opioid use presenting for the following issues.    (M54.42,  G89.29) Chronic midline low back pain with left-sided sciatica  (primary encounter diagnosis)  20+ year history with longstanding opioid use.  Symptoms stable, worse with activity requiring assistive device for ambulation. Currently doing well on current medication regimen..  Discussed importance of physical therapy.  Also considering referral to spine clinic for further evaluation, discussed additional treatment options(injections?).  With etiology not completely clear, this would be beneficial.  -Schedule PT  -Continue Norco 5-325mg 2 times daily.  - Continue gabapentin  -Continue tizanidine      (F11.90) Chronic, continuous use of opioids  In setting of chronic low back pain.  Stable regimen of Norco 5-325 mg 2 times daily.  Consider referral to spine clinic as above.  Fall risk increased with any sedating medication.  We will continue to discuss other treatment options moving forward.      (M25.562,  G89.29) Chronic pain of left knee  Longstanding left knee pain.  Has had imaging in the past showing significant medial joint space narrowing and subchondral sclerosis with osteophytes.  Pain with ambulation.  X-ray and exam consistent with osteoarthritis.  Interested in steroid injection, will get this scheduled.  I also believe that physical therapy may be a reasonable option.  We will consider referral to orthopedic surgery in the future pending response to above.  -Follow-up for steroid injection.  -Continue pain management as above.      No follow-ups on file.    Jamie Ritchie MD  [facility] Freitas is a 82 year old, presenting for the following health issues:  Follow Up (On pain issue ), Knee Pain (On left side), and Medication Reconciliation  "(Completed)      HPI   Left Knee  Longstanding history of left knee pain.  Pain worse with walking and standing.  Feels like grinding sensation.  No radiation down the leg.  Also painful to get out of bed in the morning.  Has been imaged 2 times times in the past, most recently in February 2022.  Demonstrated significant medial joint narrowing, subchondral sclerosis with osteophytes.  Progression of disease since last imaging in 2014.    Pain History:  When did you first notice your pain? - Chronic Pain   Have you seen this provider for your pain in the past?   Yes   Where in your body do you have pain?  Low back, left knee, hips.  Are you seeing anyone else for your pain? No    Chronic Pain Follow Up:    Location of pain: Low back  Analgesia/pain control: Norco 3-325 mg 2 times daily, Zanaflex 4 mg as needed, gabapentin 600 mg as needed every 8 hours.   - Recent changes:  Stable   - Overall control: Tolerable with discomfort    - Current treatments: Hydrocodone. Has done cortisone injections in the past.   Adherence:     - Do you ever take more pain medicine than prescribed? No    - When did you take your last dose of pain medicine?  Today    Adverse effects: No   PDMP Review       Value Time User    State PDMP site checked  Yes 3/14/2023  2:21 PM Jamie Ritchie MD        Last CSA Agreement:   CSA -- Patient Level:     [Media Unavailable] Controlled Substance Agreement - Opioid - Scan on 9/30/2022 12:57 PM       Last UDS: 11/3/2022    Review of Systems   Constitutional, HEENT, cardiovascular, pulmonary, gi and gu systems are negative, except as otherwise noted.      Objective    /78   Pulse 84   Temp 98.1  F (36.7  C)   Resp 22   Ht 1.727 m (5' 8\")   Wt 121.6 kg (268 lb)   SpO2 93%   BMI 40.75 kg/m    Body mass index is 40.75 kg/m .  Physical Exam   GENERAL: healthy, alert and no distress  RESP: Comfortable respiratory effort.  CV: No cyanosis.  MS: Left knee - no effusion. No tenderness along " the joint line Good range of motion.

## 2023-04-02 ENCOUNTER — HEALTH MAINTENANCE LETTER (OUTPATIENT)
Age: 83
End: 2023-04-02

## 2023-04-06 ENCOUNTER — MYC REFILL (OUTPATIENT)
Dept: FAMILY MEDICINE | Facility: CLINIC | Age: 83
End: 2023-04-06
Payer: COMMERCIAL

## 2023-04-06 DIAGNOSIS — G89.4 CHRONIC PAIN SYNDROME: ICD-10-CM

## 2023-04-06 DIAGNOSIS — G89.29 CHRONIC MIDLINE LOW BACK PAIN WITH LEFT-SIDED SCIATICA: ICD-10-CM

## 2023-04-06 DIAGNOSIS — M54.42 CHRONIC MIDLINE LOW BACK PAIN WITH LEFT-SIDED SCIATICA: ICD-10-CM

## 2023-04-07 RX ORDER — HYDROCODONE BITARTRATE AND ACETAMINOPHEN 5; 325 MG/1; MG/1
1 TABLET ORAL 2 TIMES DAILY
Qty: 60 TABLET | Refills: 0 | Status: SHIPPED | OUTPATIENT
Start: 2023-04-10 | End: 2023-05-07

## 2023-04-07 RX ORDER — GABAPENTIN 300 MG/1
600 CAPSULE ORAL EVERY 8 HOURS PRN
Qty: 180 CAPSULE | Refills: 1 | Status: SHIPPED | OUTPATIENT
Start: 2023-04-07 | End: 2023-06-08

## 2023-05-07 ENCOUNTER — MYC REFILL (OUTPATIENT)
Dept: FAMILY MEDICINE | Facility: CLINIC | Age: 83
End: 2023-05-07
Payer: COMMERCIAL

## 2023-05-07 DIAGNOSIS — G89.29 CHRONIC MIDLINE LOW BACK PAIN WITH LEFT-SIDED SCIATICA: ICD-10-CM

## 2023-05-07 DIAGNOSIS — M54.42 CHRONIC MIDLINE LOW BACK PAIN WITH LEFT-SIDED SCIATICA: ICD-10-CM

## 2023-05-08 RX ORDER — HYDROCODONE BITARTRATE AND ACETAMINOPHEN 5; 325 MG/1; MG/1
1 TABLET ORAL 2 TIMES DAILY
Qty: 60 TABLET | Refills: 0 | Status: SHIPPED | OUTPATIENT
Start: 2023-05-11 | End: 2023-06-07

## 2023-05-15 ENCOUNTER — OFFICE VISIT (OUTPATIENT)
Dept: FAMILY MEDICINE | Facility: CLINIC | Age: 83
End: 2023-05-15
Payer: COMMERCIAL

## 2023-05-15 VITALS
DIASTOLIC BLOOD PRESSURE: 74 MMHG | BODY MASS INDEX: 42.57 KG/M2 | WEIGHT: 280 LBS | OXYGEN SATURATION: 97 % | SYSTOLIC BLOOD PRESSURE: 133 MMHG | HEART RATE: 77 BPM

## 2023-05-15 DIAGNOSIS — M54.42 CHRONIC MIDLINE LOW BACK PAIN WITH LEFT-SIDED SCIATICA: ICD-10-CM

## 2023-05-15 DIAGNOSIS — G89.29 CHRONIC MIDLINE LOW BACK PAIN WITH LEFT-SIDED SCIATICA: ICD-10-CM

## 2023-05-15 DIAGNOSIS — M17.12 ARTHRITIS OF LEFT KNEE: Primary | ICD-10-CM

## 2023-05-15 PROCEDURE — 20610 DRAIN/INJ JOINT/BURSA W/O US: CPT | Mod: GC

## 2023-05-15 PROCEDURE — 99213 OFFICE O/P EST LOW 20 MIN: CPT | Mod: 25

## 2023-05-15 RX ORDER — TRIAMCINOLONE ACETONIDE 40 MG/ML
40 INJECTION, SUSPENSION INTRA-ARTICULAR; INTRAMUSCULAR ONCE
Status: COMPLETED | OUTPATIENT
Start: 2023-05-15 | End: 2023-05-15

## 2023-05-15 RX ADMIN — TRIAMCINOLONE ACETONIDE 40 MG: 40 INJECTION, SUSPENSION INTRA-ARTICULAR; INTRAMUSCULAR at 09:35
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
## 2023-05-19 NOTE — PROGRESS NOTES
Preceptor Attestation:   Patient seen, evaluated and discussed with the resident. I was present for and supervised the entire procedure. I have verified the content of the note, which accurately reflects my assessment of the patient and the plan of care.  Supervising Physician:Hamlet Garcia MD  [facility]

## 2023-07-07 ENCOUNTER — MYC REFILL (OUTPATIENT)
Dept: FAMILY MEDICINE | Facility: CLINIC | Age: 83
End: 2023-07-07
Payer: COMMERCIAL

## 2023-07-07 DIAGNOSIS — G89.29 CHRONIC MIDLINE LOW BACK PAIN WITH LEFT-SIDED SCIATICA: ICD-10-CM

## 2023-07-07 DIAGNOSIS — M54.42 CHRONIC MIDLINE LOW BACK PAIN WITH LEFT-SIDED SCIATICA: ICD-10-CM

## 2023-07-07 RX ORDER — HYDROCODONE BITARTRATE AND ACETAMINOPHEN 5; 325 MG/1; MG/1
1 TABLET ORAL 2 TIMES DAILY
Qty: 60 TABLET | Refills: 0 | Status: SHIPPED | OUTPATIENT
Start: 2023-07-09 | End: 2023-08-08

## 2023-07-10 ENCOUNTER — MYC MEDICAL ADVICE (OUTPATIENT)
Dept: FAMILY MEDICINE | Facility: CLINIC | Age: 83
End: 2023-07-10
Payer: COMMERCIAL

## 2023-07-10 DIAGNOSIS — E03.9 HYPOTHYROIDISM, UNSPECIFIED TYPE: ICD-10-CM

## 2023-07-10 RX ORDER — LEVOTHYROXINE SODIUM 125 UG/1
125 TABLET ORAL DAILY
Qty: 90 TABLET | Refills: 1 | Status: SHIPPED | OUTPATIENT
Start: 2023-07-10 | End: 2023-10-03

## 2023-08-08 ENCOUNTER — MYC MEDICAL ADVICE (OUTPATIENT)
Dept: FAMILY MEDICINE | Facility: CLINIC | Age: 83
End: 2023-08-08
Payer: COMMERCIAL

## 2023-08-08 DIAGNOSIS — G89.4 CHRONIC PAIN SYNDROME: ICD-10-CM

## 2023-08-08 DIAGNOSIS — M54.42 CHRONIC MIDLINE LOW BACK PAIN WITH LEFT-SIDED SCIATICA: ICD-10-CM

## 2023-08-08 DIAGNOSIS — G89.29 CHRONIC MIDLINE LOW BACK PAIN WITH LEFT-SIDED SCIATICA: ICD-10-CM

## 2023-08-08 RX ORDER — GABAPENTIN 300 MG/1
600 CAPSULE ORAL EVERY 8 HOURS PRN
Qty: 180 CAPSULE | Refills: 1 | Status: SHIPPED | OUTPATIENT
Start: 2023-08-08 | End: 2023-10-04

## 2023-08-08 RX ORDER — HYDROCODONE BITARTRATE AND ACETAMINOPHEN 5; 325 MG/1; MG/1
1 TABLET ORAL 2 TIMES DAILY
Qty: 60 TABLET | Refills: 0 | Status: SHIPPED | OUTPATIENT
Start: 2023-08-08 | End: 2023-09-08

## 2023-08-09 RX ORDER — GABAPENTIN 300 MG/1
600 CAPSULE ORAL EVERY 8 HOURS PRN
Qty: 180 CAPSULE | OUTPATIENT
Start: 2023-08-09

## 2023-08-21 ENCOUNTER — MYC MEDICAL ADVICE (OUTPATIENT)
Dept: CARE COORDINATION | Facility: CLINIC | Age: 83
End: 2023-08-21
Payer: COMMERCIAL

## 2023-09-02 ENCOUNTER — HEALTH MAINTENANCE LETTER (OUTPATIENT)
Age: 83
End: 2023-09-02

## 2023-09-08 ENCOUNTER — MYC MEDICAL ADVICE (OUTPATIENT)
Dept: FAMILY MEDICINE | Facility: CLINIC | Age: 83
End: 2023-09-08
Payer: COMMERCIAL

## 2023-09-08 ENCOUNTER — MYC REFILL (OUTPATIENT)
Dept: FAMILY MEDICINE | Facility: CLINIC | Age: 83
End: 2023-09-08
Payer: COMMERCIAL

## 2023-09-08 DIAGNOSIS — G89.29 CHRONIC MIDLINE LOW BACK PAIN WITH LEFT-SIDED SCIATICA: ICD-10-CM

## 2023-09-08 DIAGNOSIS — M54.42 CHRONIC MIDLINE LOW BACK PAIN WITH LEFT-SIDED SCIATICA: ICD-10-CM

## 2023-09-08 RX ORDER — HYDROCODONE BITARTRATE AND ACETAMINOPHEN 5; 325 MG/1; MG/1
1 TABLET ORAL 2 TIMES DAILY
Qty: 60 TABLET | Refills: 0 | Status: SHIPPED | OUTPATIENT
Start: 2023-09-08 | End: 2023-10-04

## 2023-09-08 ASSESSMENT — ANXIETY QUESTIONNAIRES
2. NOT BEING ABLE TO STOP OR CONTROL WORRYING: NOT AT ALL
6. BECOMING EASILY ANNOYED OR IRRITABLE: NOT AT ALL
1. FEELING NERVOUS, ANXIOUS, OR ON EDGE: NOT AT ALL
IF YOU CHECKED OFF ANY PROBLEMS ON THIS QUESTIONNAIRE, HOW DIFFICULT HAVE THESE PROBLEMS MADE IT FOR YOU TO DO YOUR WORK, TAKE CARE OF THINGS AT HOME, OR GET ALONG WITH OTHER PEOPLE: NOT DIFFICULT AT ALL
3. WORRYING TOO MUCH ABOUT DIFFERENT THINGS: NOT AT ALL
5. BEING SO RESTLESS THAT IT IS HARD TO SIT STILL: NOT AT ALL
GAD7 TOTAL SCORE: 0
GAD7 TOTAL SCORE: 0
7. FEELING AFRAID AS IF SOMETHING AWFUL MIGHT HAPPEN: NOT AT ALL
4. TROUBLE RELAXING: NOT AT ALL

## 2023-09-08 ASSESSMENT — PATIENT HEALTH QUESTIONNAIRE - PHQ9
SUM OF ALL RESPONSES TO PHQ QUESTIONS 1-9: 1
SUM OF ALL RESPONSES TO PHQ QUESTIONS 1-9: 1
10. IF YOU CHECKED OFF ANY PROBLEMS, HOW DIFFICULT HAVE THESE PROBLEMS MADE IT FOR YOU TO DO YOUR WORK, TAKE CARE OF THINGS AT HOME, OR GET ALONG WITH OTHER PEOPLE: NOT DIFFICULT AT ALL

## 2023-09-08 NOTE — TELEPHONE ENCOUNTER
Has upcoming appointment to discuss chronic pain. Will refill [place].    Jamie Ritchie MD PGY-3  [facility]

## 2023-09-29 ENCOUNTER — OFFICE VISIT (OUTPATIENT)
Dept: FAMILY MEDICINE | Facility: CLINIC | Age: 83
End: 2023-09-29
Payer: COMMERCIAL

## 2023-09-29 VITALS
SYSTOLIC BLOOD PRESSURE: 133 MMHG | RESPIRATION RATE: 16 BRPM | OXYGEN SATURATION: 95 % | TEMPERATURE: 98.7 F | DIASTOLIC BLOOD PRESSURE: 82 MMHG | HEART RATE: 79 BPM | HEIGHT: 68 IN | BODY MASS INDEX: 43.5 KG/M2 | WEIGHT: 287 LBS

## 2023-09-29 DIAGNOSIS — N18.2 CKD (CHRONIC KIDNEY DISEASE) STAGE 2, GFR 60-89 ML/MIN: ICD-10-CM

## 2023-09-29 DIAGNOSIS — H60.543 DERMATITIS OF EAR CANAL, BILATERAL: Primary | ICD-10-CM

## 2023-09-29 DIAGNOSIS — F11.90 CHRONIC, CONTINUOUS USE OF OPIOIDS: ICD-10-CM

## 2023-09-29 DIAGNOSIS — R73.9 HYPERGLYCEMIA: ICD-10-CM

## 2023-09-29 DIAGNOSIS — E78.5 HYPERLIPIDEMIA LDL GOAL <130: ICD-10-CM

## 2023-09-29 DIAGNOSIS — E03.9 HYPOTHYROIDISM, UNSPECIFIED TYPE: ICD-10-CM

## 2023-09-29 DIAGNOSIS — M17.12 PRIMARY LOCALIZED OSTEOARTHROSIS OF LEFT LOWER LEG: ICD-10-CM

## 2023-09-29 DIAGNOSIS — M17.12 ARTHRITIS OF LEFT KNEE: ICD-10-CM

## 2023-09-29 LAB
AMPHETAMINES UR QL: NOT DETECTED
ANION GAP SERPL CALCULATED.3IONS-SCNC: 11 MMOL/L (ref 7–15)
BARBITURATES UR QL SCN: NOT DETECTED
BENZODIAZ UR QL SCN: NOT DETECTED
BUN SERPL-MCNC: 20.5 MG/DL (ref 8–23)
BUPRENORPHINE UR QL: NOT DETECTED
CALCIUM SERPL-MCNC: 10 MG/DL (ref 8.8–10.2)
CANNABINOIDS UR QL: NOT DETECTED
CHLORIDE SERPL-SCNC: 102 MMOL/L (ref 98–107)
CHOLEST SERPL-MCNC: 163 MG/DL
COCAINE UR QL SCN: NOT DETECTED
CREAT SERPL-MCNC: 0.89 MG/DL (ref 0.51–0.95)
D-METHAMPHET UR QL: NOT DETECTED
DEPRECATED HCO3 PLAS-SCNC: 26 MMOL/L (ref 22–29)
EGFRCR SERPLBLD CKD-EPI 2021: 65 ML/MIN/1.73M2
GLUCOSE SERPL-MCNC: 101 MG/DL (ref 70–99)
HBA1C MFR BLD: 5.6 % (ref 0–5.6)
HDLC SERPL-MCNC: 54 MG/DL
LDLC SERPL CALC-MCNC: 76 MG/DL
METHADONE UR QL SCN: NOT DETECTED
NONHDLC SERPL-MCNC: 109 MG/DL
OPIATES UR QL SCN: DETECTED
OXYCODONE UR QL SCN: NOT DETECTED
PCP UR QL SCN: NOT DETECTED
POTASSIUM SERPL-SCNC: 4.7 MMOL/L (ref 3.4–5.3)
SODIUM SERPL-SCNC: 139 MMOL/L (ref 135–145)
T4 FREE SERPL-MCNC: 1.7 NG/DL (ref 0.9–1.7)
TRICYCLICS UR QL SCN: NOT DETECTED
TRIGL SERPL-MCNC: 166 MG/DL
TSH SERPL DL<=0.005 MIU/L-ACNC: 0.05 UIU/ML (ref 0.3–4.2)

## 2023-09-29 PROCEDURE — 80048 BASIC METABOLIC PNL TOTAL CA: CPT

## 2023-09-29 PROCEDURE — 80306 DRUG TEST PRSMV INSTRMNT: CPT

## 2023-09-29 PROCEDURE — 84443 ASSAY THYROID STIM HORMONE: CPT

## 2023-09-29 PROCEDURE — 83036 HEMOGLOBIN GLYCOSYLATED A1C: CPT

## 2023-09-29 PROCEDURE — 36415 COLL VENOUS BLD VENIPUNCTURE: CPT

## 2023-09-29 PROCEDURE — 99214 OFFICE O/P EST MOD 30 MIN: CPT | Mod: 25

## 2023-09-29 PROCEDURE — 20610 DRAIN/INJ JOINT/BURSA W/O US: CPT | Mod: GC

## 2023-09-29 PROCEDURE — 80061 LIPID PANEL: CPT

## 2023-09-29 PROCEDURE — 84439 ASSAY OF FREE THYROXINE: CPT

## 2023-09-29 RX ORDER — TRIAMCINOLONE ACETONIDE 40 MG/ML
40 INJECTION, SUSPENSION INTRA-ARTICULAR; INTRAMUSCULAR ONCE
Status: COMPLETED | OUTPATIENT
Start: 2023-09-29 | End: 2023-09-29

## 2023-09-29 RX ORDER — KETOCONAZOLE 20 MG/G
CREAM TOPICAL DAILY
Qty: 60 G | Refills: 1 | Status: SHIPPED | OUTPATIENT
Start: 2023-09-29 | End: 2024-05-13

## 2023-09-29 RX ADMIN — TRIAMCINOLONE ACETONIDE 40 MG: 40 INJECTION, SUSPENSION INTRA-ARTICULAR; INTRAMUSCULAR at 16:33

## 2023-09-29 NOTE — PROGRESS NOTES
Assessment & Plan     Dermatitis of ear canal, bilateral  Appears to be fungal in nature, will try topical ketoconazole.  Follow-up if symptoms persist.  - ketoconazole (NIZORAL) 2 % external cream  Dispense: 60 g; Refill: 1    Chronic, continuous use of opioids  Stable on hydrocodone 5 mg 2 times daily.  Continues to have low back pain, we did discuss that we would like to avoid adding additional opioid medications due to side effect profile.  Discussed referral to spine clinic for further evaluation.  We will continue to offer this.  Likely benefit from physical therapy as well.  - Urine Drugs of Abuse Screen Panel 13  - Urine Drugs of Abuse Screen Panel 13  -Continue hydrocodone acetaminophen 2 times daily    Primary localized osteoarthrosis of left lower leg  Arthritis of left knee  Ongoing left knee pain, responded well to cortisone injection in May 2023.  We will repeat injection today.  Procedure note as below.  - triamcinolone (KENALOG-40) injection 40 mg  - Large Joint/Bursa injection and/or drainage (Shoulder, Knee)    Hypothyroidism, unspecified type  Currently on levothyroxine 125 mcg daily.  Most recent TSH on the low side.  We will repeat today.  Asymptomatic.  Plan to titrate levothyroxine pending new levels.  - TSH with free T4 reflex  - TSH with free T4 reflex    Hyperlipidemia LDL goal <130  Currently stable on pravastatin.  We will repeat lipid panel today.  - Lipid Profile  - Lipid Profile    CKD stage 2  We will repeat BMP today.  Most recent GFR of 58.  Not on any nephrotoxic medications  - Basic metabolic panel  - Basic metabolic panel    Hyperglycemia  Elevation in blood glucose on a BMP from 2022.  We will check an A1c today.  - Hemoglobin A1c  - Hemoglobin A1c        No follow-ups on file.    Jamie Ritchie MD  [facility] Freitas is a 82 year old, presenting for the following health issues:  Follow Up (Pain on back and knee), Ear Problem  "(Noticing crusty after waking up, like to get check ), and Medication Reconciliation (Med reviewed)    HPI     Hypothyroidism   - Currently on levothyroxine 125mcg daily, which was titrated down.  Feeling well overall.    Hyperlipidemia   Currently on Pracastatin 80mg daily     Chronic midline low back pain   Chronic opioid use.  Hydrocodone 5mg BID   Gabapentin 600mg every 8 hours as needed  Symptoms have been stable, occasional exacerbations.  Occasionally taking an extra dose of hydrocodone acetaminophen.      Ear dermatitis   Off-and-on irritation of the outer ear canal for the past year.  Symptoms appear to come and go.  Itchy, flaky skin.  No ear drainage.  Did have a plugged up right ear recently, this is since resolved.  Believes is related to allergies.  Using Claritin-D for symptom relief.  No other rash on her body.    Left knee osteoarthritis  History of significant osteoarthritis in the left knee.  Underwent corticosteroid injection in May 2023 with good relief, which has since worn off over the past few weeks.  Requesting repeat injection today.      Objective    /82 (BP Location: Right arm, Patient Position: Sitting, Cuff Size: Adult Large)   Pulse 79   Temp 98.7  F (37.1  C) (Oral)   Resp 16   Ht 1.727 m (5' 8\")   Wt 130.2 kg (287 lb)   SpO2 95%   BMI 43.64 kg/m    Body mass index is 43.64 kg/m .  Physical Exam   Constitutional: Conversant, well developed. No acute distress  Eyes: Anicteric sclerae, no lid lag  Respiratory: Normal respiratory effort  Cardiovascular: Regular rate and rhythm.  Skin: No rash, lesion, or ulcer  Musculoskeletal: No digital cyanosis.  2+ pitting edema to her shins.  Psych: Intact judgment and insight. Alert and oriented x3. Cordial affect   Ears - erythema in the external canals bilaterally, overlying crusted skin.  No drainage.  Cerumen present in the canals bilaterally.  Visualized TMs look gray and translucent bilaterally.        PROCEDURE:  JOINT " INJECTION.         After a discussion of risks, benefits and side effects of procedure, informed patient consent was obtained.       The left Knee was prepped and draped in the usual clean fashion (sterile not required for this procedure).       INJECTION:  Using 4 cc of 1% lidocaine mixed                           with 40 mg of kenalog, the left knee was successfully injected                           without complication.  Patient did experience some pain                          relief following injection.

## 2023-09-29 NOTE — PROGRESS NOTES
Preceptor Attestation:   Patient seen, evaluated and discussed with the resident. I was present for and supervised the entire procedure with Dr. Jamie Ritchie. I have verified the content of the note, which accurately reflects my assessment of the patient and the plan of care.    Supervising Physician:  Benjamin Rosenstein, MD, MA  [facility]

## 2023-10-03 ENCOUNTER — TELEPHONE (OUTPATIENT)
Dept: FAMILY MEDICINE | Facility: CLINIC | Age: 83
End: 2023-10-03
Payer: COMMERCIAL

## 2023-10-03 DIAGNOSIS — E03.9 HYPOTHYROIDISM, UNSPECIFIED TYPE: Primary | ICD-10-CM

## 2023-10-03 RX ORDER — LEVOTHYROXINE SODIUM 100 UG/1
100 TABLET ORAL DAILY
Qty: 90 TABLET | Refills: 2 | Status: SHIPPED | OUTPATIENT
Start: 2023-10-03 | End: 2023-12-04

## 2023-10-03 NOTE — TELEPHONE ENCOUNTER
Called patient to discuss labs, no answer, left a message.     Jamie Ritchie MD PGY-3  [facility]

## 2023-10-04 ENCOUNTER — MYC REFILL (OUTPATIENT)
Dept: FAMILY MEDICINE | Facility: CLINIC | Age: 83
End: 2023-10-04
Payer: COMMERCIAL

## 2023-10-04 ENCOUNTER — MYC MEDICAL ADVICE (OUTPATIENT)
Dept: FAMILY MEDICINE | Facility: CLINIC | Age: 83
End: 2023-10-04
Payer: COMMERCIAL

## 2023-10-04 ENCOUNTER — TELEPHONE (OUTPATIENT)
Dept: FAMILY MEDICINE | Facility: CLINIC | Age: 83
End: 2023-10-04
Payer: COMMERCIAL

## 2023-10-04 DIAGNOSIS — E78.5 HYPERLIPIDEMIA LDL GOAL <130: ICD-10-CM

## 2023-10-04 DIAGNOSIS — G89.29 CHRONIC MIDLINE LOW BACK PAIN WITH LEFT-SIDED SCIATICA: ICD-10-CM

## 2023-10-04 DIAGNOSIS — E03.9 HYPOTHYROIDISM, UNSPECIFIED TYPE: Primary | ICD-10-CM

## 2023-10-04 DIAGNOSIS — G89.4 CHRONIC PAIN SYNDROME: ICD-10-CM

## 2023-10-04 DIAGNOSIS — M54.42 CHRONIC MIDLINE LOW BACK PAIN WITH LEFT-SIDED SCIATICA: ICD-10-CM

## 2023-10-04 RX ORDER — PRAVASTATIN SODIUM 80 MG/1
80 TABLET ORAL DAILY
Qty: 90 TABLET | Refills: 3 | Status: SHIPPED | OUTPATIENT
Start: 2023-10-04 | End: 2024-01-03

## 2023-10-04 RX ORDER — HYDROCODONE BITARTRATE AND ACETAMINOPHEN 5; 325 MG/1; MG/1
1 TABLET ORAL 2 TIMES DAILY
Qty: 60 TABLET | Refills: 0 | Status: SHIPPED | OUTPATIENT
Start: 2023-10-08 | End: 2023-11-05

## 2023-10-04 RX ORDER — GABAPENTIN 300 MG/1
600 CAPSULE ORAL EVERY 8 HOURS PRN
Qty: 180 CAPSULE | Refills: 2 | Status: SHIPPED | OUTPATIENT
Start: 2023-10-04 | End: 2024-01-03

## 2023-10-04 NOTE — TELEPHONE ENCOUNTER
I called pt to schedule lab visit no answer, LVMTCB to make lab only appointment in 8 weeks so around 11/29th to recheck Thyroid function.     Per PCP message wants pt to be scheduled in 8 weeks for a lab only visit to recheck Thyroid.

## 2023-10-11 ENCOUNTER — TRANSFERRED RECORDS (OUTPATIENT)
Dept: MULTI SPECIALTY CLINIC | Facility: CLINIC | Age: 83
End: 2023-10-11

## 2023-10-11 LAB — RETINOPATHY: NORMAL

## 2023-11-05 ENCOUNTER — MYC REFILL (OUTPATIENT)
Dept: FAMILY MEDICINE | Facility: CLINIC | Age: 83
End: 2023-11-05
Payer: COMMERCIAL

## 2023-11-05 DIAGNOSIS — G89.29 CHRONIC MIDLINE LOW BACK PAIN WITH LEFT-SIDED SCIATICA: ICD-10-CM

## 2023-11-05 DIAGNOSIS — M54.42 CHRONIC MIDLINE LOW BACK PAIN WITH LEFT-SIDED SCIATICA: ICD-10-CM

## 2023-11-06 RX ORDER — HYDROCODONE BITARTRATE AND ACETAMINOPHEN 5; 325 MG/1; MG/1
1 TABLET ORAL 2 TIMES DAILY
Qty: 60 TABLET | Refills: 0 | Status: SHIPPED | OUTPATIENT
Start: 2023-11-08 | End: 2023-12-06

## 2023-11-11 ENCOUNTER — HEALTH MAINTENANCE LETTER (OUTPATIENT)
Age: 83
End: 2023-11-11

## 2023-11-30 ENCOUNTER — LAB (OUTPATIENT)
Dept: LAB | Facility: CLINIC | Age: 83
End: 2023-11-30
Payer: COMMERCIAL

## 2023-11-30 DIAGNOSIS — E03.9 HYPOTHYROIDISM, UNSPECIFIED TYPE: ICD-10-CM

## 2023-11-30 PROCEDURE — 84443 ASSAY THYROID STIM HORMONE: CPT

## 2023-11-30 PROCEDURE — 36415 COLL VENOUS BLD VENIPUNCTURE: CPT

## 2023-12-01 LAB — TSH SERPL DL<=0.005 MIU/L-ACNC: 0.3 UIU/ML (ref 0.3–4.2)

## 2023-12-04 ENCOUNTER — TELEPHONE (OUTPATIENT)
Dept: FAMILY MEDICINE | Facility: CLINIC | Age: 83
End: 2023-12-04
Payer: COMMERCIAL

## 2023-12-04 DIAGNOSIS — E03.9 HYPOTHYROIDISM, UNSPECIFIED TYPE: Primary | ICD-10-CM

## 2023-12-04 RX ORDER — LEVOTHYROXINE SODIUM 75 UG/1
75 TABLET ORAL DAILY
Qty: 90 TABLET | Refills: 1 | Status: SHIPPED | OUTPATIENT
Start: 2023-12-04 | End: 2024-06-09

## 2023-12-04 NOTE — TELEPHONE ENCOUNTER
Called patient discussed TSH. No answer, left message. Still below goal at 0.30.  Will plan to decrease levothyroxine to 75 mcg daily.    Jamie Ritchie MD PGY-3  [facility]

## 2023-12-06 ENCOUNTER — MYC REFILL (OUTPATIENT)
Dept: FAMILY MEDICINE | Facility: CLINIC | Age: 83
End: 2023-12-06
Payer: COMMERCIAL

## 2023-12-06 DIAGNOSIS — M54.42 CHRONIC MIDLINE LOW BACK PAIN WITH LEFT-SIDED SCIATICA: ICD-10-CM

## 2023-12-06 DIAGNOSIS — G89.29 CHRONIC MIDLINE LOW BACK PAIN WITH LEFT-SIDED SCIATICA: ICD-10-CM

## 2023-12-06 RX ORDER — HYDROCODONE BITARTRATE AND ACETAMINOPHEN 5; 325 MG/1; MG/1
1 TABLET ORAL 2 TIMES DAILY
Qty: 60 TABLET | Refills: 0 | Status: SHIPPED | OUTPATIENT
Start: 2023-12-08 | End: 2024-01-03

## 2024-01-03 ENCOUNTER — MYC MEDICAL ADVICE (OUTPATIENT)
Dept: FAMILY MEDICINE | Facility: CLINIC | Age: 84
End: 2024-01-03
Payer: COMMERCIAL

## 2024-01-03 DIAGNOSIS — E03.9 HYPOTHYROIDISM, UNSPECIFIED TYPE: Primary | ICD-10-CM

## 2024-01-20 ENCOUNTER — HEALTH MAINTENANCE LETTER (OUTPATIENT)
Age: 84
End: 2024-01-20

## 2024-03-05 ENCOUNTER — APPOINTMENT (OUTPATIENT)
Dept: RADIOLOGY | Facility: HOSPITAL | Age: 84
End: 2024-03-05
Payer: COMMERCIAL

## 2024-03-05 ENCOUNTER — HOSPITAL ENCOUNTER (EMERGENCY)
Facility: HOSPITAL | Age: 84
Discharge: HOME | End: 2024-03-05
Payer: COMMERCIAL

## 2024-03-05 VITALS
DIASTOLIC BLOOD PRESSURE: 77 MMHG | TEMPERATURE: 98.1 F | WEIGHT: 289 LBS | OXYGEN SATURATION: 97 % | SYSTOLIC BLOOD PRESSURE: 148 MMHG | BODY MASS INDEX: 43.94 KG/M2 | RESPIRATION RATE: 20 BRPM | HEART RATE: 75 BPM

## 2024-03-05 DIAGNOSIS — U07.1 COVID-19: ICD-10-CM

## 2024-03-05 DIAGNOSIS — H60.501 ACUTE OTITIS EXTERNA OF RIGHT EAR, UNSPECIFIED TYPE: ICD-10-CM

## 2024-03-05 DIAGNOSIS — H60.13 CELLULITIS OF BOTH EARS: ICD-10-CM

## 2024-03-05 DIAGNOSIS — H60.502 ACUTE OTITIS EXTERNA OF LEFT EAR, UNSPECIFIED TYPE: ICD-10-CM

## 2024-03-05 DIAGNOSIS — I10 HYPERTENSION: ICD-10-CM

## 2024-03-05 LAB
ANION GAP SERPL CALCULATED.3IONS-SCNC: 9 MMOL/L (ref 7–15)
BASOPHILS # BLD AUTO: ABNORMAL 10*3/UL
BASOPHILS # BLD MANUAL: 0 10E3/UL (ref 0–0.2)
BASOPHILS NFR BLD AUTO: ABNORMAL %
BASOPHILS NFR BLD MANUAL: 0 %
BUN SERPL-MCNC: 18 MG/DL (ref 8–23)
CALCIUM SERPL-MCNC: 9.5 MG/DL (ref 8.8–10.2)
CHLORIDE SERPL-SCNC: 105 MMOL/L (ref 98–107)
CREAT SERPL-MCNC: 0.96 MG/DL (ref 0.51–0.95)
DEPRECATED HCO3 PLAS-SCNC: 28 MMOL/L (ref 22–29)
EGFRCR SERPLBLD CKD-EPI 2021: 59 ML/MIN/1.73M2
EOSINOPHIL # BLD AUTO: ABNORMAL 10*3/UL
EOSINOPHIL # BLD MANUAL: 0 10E3/UL (ref 0–0.7)
EOSINOPHIL NFR BLD AUTO: ABNORMAL %
EOSINOPHIL NFR BLD MANUAL: 0 %
ERYTHROCYTE [DISTWIDTH] IN BLOOD BY AUTOMATED COUNT: 14.4 % (ref 10–15)
FLUAV RNA SPEC QL NAA+PROBE: NEGATIVE
FLUBV RNA RESP QL NAA+PROBE: NEGATIVE
GLUCOSE SERPL-MCNC: 92 MG/DL (ref 70–99)
GROUP A STREP BY PCR: NOT DETECTED
HCT VFR BLD AUTO: 40.8 % (ref 35–47)
HGB BLD-MCNC: 13 G/DL (ref 11.7–15.7)
IMM GRANULOCYTES # BLD: ABNORMAL 10*3/UL
IMM GRANULOCYTES NFR BLD: ABNORMAL %
LACTATE SERPL-SCNC: 1.2 MMOL/L (ref 0.7–2)
LACTATE SERPL-SCNC: 2.7 MMOL/L (ref 0.7–2)
LYMPHOCYTES # BLD AUTO: ABNORMAL 10*3/UL
LYMPHOCYTES # BLD MANUAL: 4.9 10E3/UL (ref 0.8–5.3)
LYMPHOCYTES NFR BLD AUTO: ABNORMAL %
LYMPHOCYTES NFR BLD MANUAL: 32 %
MCH RBC QN AUTO: 32.9 PG (ref 26.5–33)
MCHC RBC AUTO-ENTMCNC: 31.9 G/DL (ref 31.5–36.5)
MCV RBC AUTO: 103 FL (ref 78–100)
MONOCYTES # BLD AUTO: ABNORMAL 10*3/UL
MONOCYTES # BLD MANUAL: 2.6 10E3/UL (ref 0–1.3)
MONOCYTES NFR BLD AUTO: ABNORMAL %
MONOCYTES NFR BLD AUTO: NEGATIVE %
MONOCYTES NFR BLD MANUAL: 17 %
NEUTROPHILS # BLD AUTO: ABNORMAL 10*3/UL
NEUTROPHILS # BLD MANUAL: 7.8 10E3/UL (ref 1.6–8.3)
NEUTROPHILS NFR BLD AUTO: ABNORMAL %
NEUTROPHILS NFR BLD MANUAL: 51 %
NRBC # BLD AUTO: 0 10E3/UL
NRBC BLD AUTO-RTO: 0 /100
PLAT MORPH BLD: ABNORMAL
PLATELET # BLD AUTO: ABNORMAL 10*3/UL
POTASSIUM SERPL-SCNC: 4.5 MMOL/L (ref 3.4–5.3)
RBC # BLD AUTO: 3.95 10E6/UL (ref 3.8–5.2)
RBC MORPH BLD: ABNORMAL
RSV RNA SPEC NAA+PROBE: NEGATIVE
SARS-COV-2 RNA RESP QL NAA+PROBE: POSITIVE
SODIUM SERPL-SCNC: 142 MMOL/L (ref 135–145)
TSH SERPL DL<=0.005 MIU/L-ACNC: 2.63 UIU/ML (ref 0.3–4.2)
WBC # BLD AUTO: 15.3 10E3/UL (ref 4–11)

## 2024-03-05 PROCEDURE — 96360 HYDRATION IV INFUSION INIT: CPT

## 2024-03-05 PROCEDURE — 85048 AUTOMATED LEUKOCYTE COUNT: CPT

## 2024-03-05 PROCEDURE — 85007 BL SMEAR W/DIFF WBC COUNT: CPT

## 2024-03-05 PROCEDURE — 80048 BASIC METABOLIC PNL TOTAL CA: CPT

## 2024-03-05 PROCEDURE — 87651 STREP A DNA AMP PROBE: CPT

## 2024-03-05 PROCEDURE — 86308 HETEROPHILE ANTIBODY SCREEN: CPT

## 2024-03-05 PROCEDURE — 71046 X-RAY EXAM CHEST 2 VIEWS: CPT

## 2024-03-05 PROCEDURE — 87637 SARSCOV2&INF A&B&RSV AMP PRB: CPT | Performed by: FAMILY MEDICINE

## 2024-03-05 PROCEDURE — 96361 HYDRATE IV INFUSION ADD-ON: CPT

## 2024-03-05 PROCEDURE — 84443 ASSAY THYROID STIM HORMONE: CPT

## 2024-03-05 PROCEDURE — 36415 COLL VENOUS BLD VENIPUNCTURE: CPT | Performed by: STUDENT IN AN ORGANIZED HEALTH CARE EDUCATION/TRAINING PROGRAM

## 2024-03-05 PROCEDURE — 258N000003 HC RX IP 258 OP 636

## 2024-03-05 PROCEDURE — 99284 EMERGENCY DEPT VISIT MOD MDM: CPT | Mod: 25

## 2024-03-05 PROCEDURE — 83605 ASSAY OF LACTIC ACID: CPT | Performed by: STUDENT IN AN ORGANIZED HEALTH CARE EDUCATION/TRAINING PROGRAM

## 2024-03-05 RX ORDER — CEFUROXIME AXETIL 500 MG/1
500 TABLET ORAL 2 TIMES DAILY
Qty: 14 TABLET | Refills: 0 | Status: SHIPPED | OUTPATIENT
Start: 2024-03-05 | End: 2024-03-12

## 2024-03-05 RX ORDER — CIPROFLOXACIN AND DEXAMETHASONE 3; 1 MG/ML; MG/ML
4 SUSPENSION/ DROPS AURICULAR (OTIC) 2 TIMES DAILY
Qty: 7.5 ML | Refills: 0 | Status: SHIPPED | OUTPATIENT
Start: 2024-03-05 | End: 2024-03-12

## 2024-03-05 RX ADMIN — SODIUM CHLORIDE 1000 ML: 9 INJECTION, SOLUTION INTRAVENOUS at 18:41

## 2024-03-05 ASSESSMENT — ACTIVITIES OF DAILY LIVING (ADL)
ADLS_ACUITY_SCORE: 35
ADLS_ACUITY_SCORE: 35

## 2024-03-05 ASSESSMENT — COLUMBIA-SUICIDE SEVERITY RATING SCALE - C-SSRS
6. HAVE YOU EVER DONE ANYTHING, STARTED TO DO ANYTHING, OR PREPARED TO DO ANYTHING TO END YOUR LIFE?: NO
2. HAVE YOU ACTUALLY HAD ANY THOUGHTS OF KILLING YOURSELF IN THE PAST MONTH?: NO
1. IN THE PAST MONTH, HAVE YOU WISHED YOU WERE DEAD OR WISHED YOU COULD GO TO SLEEP AND NOT WAKE UP?: NO

## 2024-03-05 NOTE — ED PROVIDER NOTES
EMERGENCY DEPARTMENT ENCOUNTER      NAME: Zena Emmanuel  AGE: 83 year old female  YOB: 1940  MRN: [id number]  EVALUATION DATE & TIME: No admission date for patient encounter.    PCP: Jamie Ritchie    ED PROVIDER: Claudette Israel PA-C      Chief Complaint   Patient presents with    Pharyngitis         FINAL IMPRESSION:  1. Acute otitis externa of left ear, unspecified type    2. Acute otitis externa of right ear, unspecified type    3. Cellulitis of both ears    4. COVID-19    5. Hypertension          ED COURSE & MEDICAL DECISION MAKIN:33 PM Met with patient for initial interview. Plan for care discussed.  5:06 PM Lab called and reported patient's lactic acid is elevated.  5:27 PM Lab results came back and patient tested positive for COVID.  6:50 PM Rechecked and updated patient. Waiting on IVF and repeat lactate.  8:08 PM Reevaluated and updated patient. Still waiting on IVF and repeat lactate.  9:10 PM Reevaluated and updated patient. I discussed the plan for discharge with the patient, and patient is agreeable. We discussed supportive cares at home and reasons for return to the ER including new or worsening symptoms. All questions and concerns addressed. Patient to be discharged by RN.    83 year old female with a history of anemia, CKD, DM II, vitamin D deficiency, uterine cancer s/p complete hysterectomy who presents to this ED for evaluation of sore throat, congestion, and bilateral drainage and dryness. Upon exam, patient is afebrile, hypertensive, but in no acute distress. Oropharyngeal erythema, no tonsillar enlargement or evidence of tonsillar or peritonsillar abscess. Bilateral ears consistent with otitis externa, no evidence of otitis media. No mastoiditis. Mild extension of erythema, abrasions, and edema to elly, tragus, and antitragus concerning for possible early cellulitis. Lungs clear to auscultation bilaterally. Differential diagnosis includes but not limited to  COVID, influenza, RSV, strep, mono, thyroid dysfunction, electrolyte abnormality, anemia, dehydration. Based on patient's presenting symptoms, laboratories and imaging were ordered.    Lactate ordered in triage elevated at 2.7. CBC with leukocytosis at 15.3. Patient non-toxic appearing and presenting with URI symptoms only. IV NS 1L ordered. Suspect viral etiology will hold on antibiotics at this time. CXR ordered to rule out pneumonia, which was negative. COVID+. Mono and strep negative. BMP with elevated Cr at 0.96, patient does have a history of CKD and has range between 0.89 and 0.98 over the last year. TSH WNL. Repeat lactate WNL.     Patient was treated with IV NS upon arrival with improvement in symptoms and lactate. Symptoms and workup most consistent with COVID infection and bilateral otitis externa and possible early cellulitis of auricle of ear. Patient was made aware of the above findings. Plan to discharge patient home with prescription Cipro-Dex and Cefuroxime per UTD recommendations. Discussed strict return precautions and close follow up with their PCP for reevaluation and ongoing management - referral placed today. Also discussed hypertension and recommendation for recheck with PCP. The patient was stable and well appearing upon discharge. The patient was advised to return to the ER if any new or worsening symptoms develop. The patient verbalizes understanding and agrees with the plan.     Medical Decision Making  Obtained supplemental history:Supplemental history obtained?: Documented in chart  Reviewed external records: External records reviewed?: Documented in chart  Care impacted by chronic illness:Cancer/Chemotherapy, Chronic Kidney Disease, Diabetes, and Other: Morbid ob  Care significantly affected by social determinants of health:N/A  Did you consider but not order tests?: Work up considered but not performed and documented in chart, if applicable  Did you interpret images independently?:  "Independent interpretation of ECG and images noted in documentation, when applicable.  Consultation discussion with other provider:Did you involve another provider (consultant, MH, pharmacy, etc.)?: No  Discharge. I prescribed additional prescription strength medication(s) as charted. See documentation for any additional details.    MEDICATIONS GIVEN IN THE EMERGENCY:  Medications   sodium chloride 0.9% BOLUS 1,000 mL (0 mLs Intravenous Stopped 3/5/24 2040)       NEW PRESCRIPTIONS STARTED AT TODAY'S ER VISIT  Discharge Medication List as of 3/5/2024  9:19 PM        START taking these medications    Details   cefuroxime (CEFTIN) 500 MG tablet Take 1 tablet (500 mg) by mouth 2 times daily for 7 days, Disp-14 tablet, R-0, E-Prescribe      ciprofloxacin-dexAMETHasone (CIPRODEX) 0.3-0.1 % otic suspension Place 4 drops into both ears 2 times daily for 7 days, Disp-7.5 mL, R-0, E-Prescribe                =================================================================    HPI    Patient information was obtained from: patient    Use of : N/A       Zena Emmanuel is a 83 year old female with a pertinent history of anemia, CKD, DM II, vitamin D deficiency, pneumonia, uterine cancer s/p complete hysterectomy who presents to this ED for evaluation of sore throat, congestion, and ear crust and drainage.    Patient reports 3 weeks of ongoing pharyngitis, crust and drainage from bilateral ears, postnasal drip, night sweats, and sinus congestion, mentioning she has used 4 boxes of tissues to blow her nose secondary to conegstion. Notes she has seen her PCP for crust and drainge from ears and was told crust was \"dandruff\", and was given an ear ointment but no ear drops, which she has been using with no relief. No measurable fevers. Denies known exposures to COVID, flu, and RSV, sick contacts, exposures to mono, recent water exposure, unintentional weight loss, frequent baths, recent viral illnesses, recent travel, " "cough, chest pain, abdominal pain, nausea, vomting, and diarrhea. She has been using ear ointment, and taking Cetirizine for sinus congestion and cough drops for pharyngitis for symptoms.     She has had thyroid issues for over 40 years and has been taking levothyroxine. Also has a history of bilateral tonsillectomy and reports her tonsils have since \"partially returned\". Notes a history of prediabetes, but is not on insulin or metformin, Mentions a history of cancer 6 years ago, but no history of throat cancer, and is concerned cancer has metastasized. Reports a history of smoking since she was 17 (for 50-60 years), noting she used to smoke half a pack a day, but reports she hasn't smoked since the past 10 years. She doesn't take BP medications. She doesn't use an inhaler, mentioning she was wheezing last night and reports a history of pneumonia. No other reported complaints or concerns at this time.        REVIEW OF SYSTEMS   Review of Systems see HPI    PAST MEDICAL HISTORY:  Past Medical History:   Diagnosis Date    Anemia     Simple chronic bronchitis (H)        PAST SURGICAL HISTORY:  Past Surgical History:   Procedure Laterality Date    Cancer complete hysterectomy      CATARACT EXTRACTION Bilateral     KNEE SURGERY Left            CURRENT MEDICATIONS:    cefuroxime (CEFTIN) 500 MG tablet  ciprofloxacin-dexAMETHasone (CIPRODEX) 0.3-0.1 % otic suspension  gabapentin (NEURONTIN) 300 MG capsule  HYDROcodone-acetaminophen (NORCO) 5-325 MG tablet  ketoconazole (NIZORAL) 2 % external cream  levothyroxine (SYNTHROID/LEVOTHROID) 75 MCG tablet  naloxone (NARCAN) 4 MG/0.1ML nasal spray  pravastatin (PRAVACHOL) 80 MG tablet  tiZANidine (ZANAFLEX) 4 MG tablet        ALLERGIES:  Allergies   Allergen Reactions    Bees Hives    Penicillins      Other reaction(s): \"Local Swelling\"       FAMILY HISTORY:  Family History   Problem Relation Age of Onset    Heart Disease Father     Heart Disease Maternal Grandmother     Heart " Disease Maternal Grandfather     Heart Disease Paternal Grandmother     Heart Disease Paternal Grandfather     Hypertension Sister     Cerebrovascular Disease Sister     Hypertension Sister        SOCIAL HISTORY:   Social History     Socioeconomic History    Marital status:    Tobacco Use    Smoking status: Former     Packs/day: 1.00     Years: 55.00     Additional pack years: 0.00     Total pack years: 55.00     Types: Cigarettes     Start date: 1956     Quit date: 8/3/2010     Years since quittin.5     Passive exposure: Past    Smokeless tobacco: Never   Substance and Sexual Activity    Alcohol use: Not Currently    Drug use: Never    Sexual activity: Not Currently     Social Determinants of Health     Financial Resource Strain: Low Risk  (2023)    Financial Resource Strain     Within the past 12 months, have you or your family members you live with been unable to get utilities (heat, electricity) when it was really needed?: No   Food Insecurity: Low Risk  (2023)    Food Insecurity     Within the past 12 months, did you worry that your food would run out before you got money to buy more?: No     Within the past 12 months, did the food you bought just not last and you didn t have money to get more?: No   Transportation Needs: Unknown (2023)    Transportation Needs     Within the past 12 months, has lack of transportation kept you from medical appointments, getting your medicines, non-medical meetings or appointments, work, or from getting things that you need?: Patient refused   Interpersonal Safety: Low Risk  (2023)    Interpersonal Safety     Do you feel physically and emotionally safe where you currently live?: Yes     Within the past 12 months, have you been hit, slapped, kicked or otherwise physically hurt by someone?: No     Within the past 12 months, have you been humiliated or emotionally abused in other ways by your partner or ex-partner?: No   Housing Stability: Low  Risk  (9/29/2023)    Housing Stability     Do you have housing? : Yes     Are you worried about losing your housing?: No       VITALS:  BP (!) 148/77   Pulse 75   Temp 98.1  F (36.7  C) (Oral)   Resp 20   Wt 131.1 kg (289 lb)   SpO2 97%   BMI 43.94 kg/m      PHYSICAL EXAM    Constitutional:  Alert, in no acute distress. Cooperative.  EYES: Conjunctivae clear.   HENT:  Atraumatic, normocephalic. Bilateral external auricles with dry, flaky abrasions and erythema and mild edema to elly, tragus, and antitragus. External canal with mild edema and purulent debris, TM without obvious erythema or perforation. Nasal congestion. Oropharynx with erythema, no swelling or exudates. Tonsils 1+ and symmetrical. Uvula midline without edema. No evidence of tonsillar or peritonsillar abscess. Moist membranes. No dental pain or periapical swelling/fluctuance. No submandibular swelling. No trismus. No buccal edema or erythema. Tolerates secretions. No nuchal rigidity. Full ROM neck without pain. No palpable cervical lymphadenopathy. Trachea midline with hoarse voice. No thyromegaly or tenderness to palpation.   Respiratory:  Respirations even, unlabored, in no acute respiratory distress. Lungs clear to auscultation bilaterally without wheeze, rhonchi, or rales. No cough. Speaks in full sentences easily.  Cardiovascular:  Regular rate and rhythm, good peripheral perfusion. No peripheral edema. No chest wall tenderness.  GI: Soft, flat, non-distended. Bowel sounds normal. No tenderness to palpation. No guarding, rebound, or other peritoneal signs.  Musculoskeletal:  No edema. No cyanosis. Range of motion major extremities intact.    Integument: Warm, Dry. No rash to visualized skin.   Neurologic:  Alert & oriented. No focal deficits noted.   Psych: Normal mood and affect.      LAB:  All pertinent labs reviewed and interpreted.  Results for orders placed or performed during the hospital encounter of 03/05/24   XR Chest 2 Views     Impression    IMPRESSION: Left hemidiaphragm is elevated. Lungs are clear. Heart and pulmonary vascularity are normal. No signs of acute disease.       Symptomatic Influenza A/B, RSV, & SARS-CoV2 PCR (COVID-19) Nasopharyngeal    Specimen: Nasopharyngeal; Swab   Result Value Ref Range    Influenza A PCR Negative Negative    Influenza B PCR Negative Negative    RSV PCR Negative Negative    SARS CoV2 PCR Positive (A) Negative   Basic metabolic panel   Result Value Ref Range    Sodium 142 135 - 145 mmol/L    Potassium 4.5 3.4 - 5.3 mmol/L    Chloride 105 98 - 107 mmol/L    Carbon Dioxide (CO2) 28 22 - 29 mmol/L    Anion Gap 9 7 - 15 mmol/L    Urea Nitrogen 18.0 8.0 - 23.0 mg/dL    Creatinine 0.96 (H) 0.51 - 0.95 mg/dL    GFR Estimate 59 (L) >60 mL/min/1.73m2    Calcium 9.5 8.8 - 10.2 mg/dL    Glucose 92 70 - 99 mg/dL   TSH with free T4 reflex   Result Value Ref Range    TSH 2.63 0.30 - 4.20 uIU/mL   Result Value Ref Range    Mononucleosis Screen Negative Negative   Lactic Acid STAT   Result Value Ref Range    Lactic Acid 2.7 (H) 0.7 - 2.0 mmol/L   CBC with platelets and differential   Result Value Ref Range    WBC Count 15.3 (H) 4.0 - 11.0 10e3/uL    RBC Count 3.95 3.80 - 5.20 10e6/uL    Hemoglobin 13.0 11.7 - 15.7 g/dL    Hematocrit 40.8 35.0 - 47.0 %     (H) 78 - 100 fL    MCH 32.9 26.5 - 33.0 pg    MCHC 31.9 31.5 - 36.5 g/dL    RDW 14.4 10.0 - 15.0 %    Platelet Count      % Neutrophils      % Lymphocytes      % Monocytes      % Eosinophils      % Basophils      % Immature Granulocytes      NRBCs per 100 WBC 0 <1 /100    Absolute Neutrophils      Absolute Lymphocytes      Absolute Monocytes      Absolute Eosinophils      Absolute Basophils      Absolute Immature Granulocytes      Absolute NRBCs 0.0 10e3/uL   Lactic acid whole blood   Result Value Ref Range    Lactic Acid 1.2 0.7 - 2.0 mmol/L   Manual Differential   Result Value Ref Range    % Neutrophils 51 %    % Lymphocytes 32 %    % Monocytes 17 %    %  Eosinophils 0 %    % Basophils 0 %    Absolute Neutrophils 7.8 1.6 - 8.3 10e3/uL    Absolute Lymphocytes 4.9 0.8 - 5.3 10e3/uL    Absolute Monocytes 2.6 (H) 0.0 - 1.3 10e3/uL    Absolute Eosinophils 0.0 0.0 - 0.7 10e3/uL    Absolute Basophils 0.0 0.0 - 0.2 10e3/uL    RBC Morphology Confirmed RBC Indices     Platelet Assessment Platelets Clumped (A) Automated Count Confirmed. Platelet morphology is normal.   Group A Streptococcus PCR Throat Swab    Specimen: Throat; Swab   Result Value Ref Range    Group A strep by PCR Not Detected Not Detected       RADIOLOGY:  Reviewed all pertinent imaging. Please see official radiology report.  XR Chest 2 Views   Final Result   IMPRESSION: Left hemidiaphragm is elevated. Lungs are clear. Heart and pulmonary vascularity are normal. No signs of acute disease.              I, Shruthi Pleitez, am serving as a scribe to document services personally performed by Claudette Israel PA-C based on my observation and the provider's statements to me. I, Claudette Israel PA-C, attest that Shruthi Pleitez is acting in a scribe capacity, has observed my performance of the services and has documented them in accordance with my direction.    Claudette Israel PA-C  [facility] EMERGENCY DEPARTMENT  [address] [id number] [phone]      Claudette Israel PA-C  03/05/24 8461

## 2024-03-05 NOTE — ED TRIAGE NOTES
The pt arrives with a c/o of three weeks of pharyngitis, sinus congestion and SOB. Has not been seen for this illness.      Triage Assessment (Adult)       Row Name 03/05/24 1544          Triage Assessment    Airway WDL WDL        Cognitive/Neuro/Behavioral WDL    Cognitive/Neuro/Behavioral WDL WDL

## 2024-03-06 ENCOUNTER — PATIENT OUTREACH (OUTPATIENT)
Dept: CARE COORDINATION | Facility: CLINIC | Age: 84
End: 2024-03-06
Payer: COMMERCIAL

## 2024-03-06 NOTE — DISCHARGE INSTRUCTIONS
You were seen in the ER and tested positive for COVID. You were prescribed an antibiotic drop and oral antibiotic for external ear infection and cellulitis - please take as directed.    Rest and drink plenty of fluids.  You may take ibuprofen and/or Tylenol as needed for pain - see dosing information below.  You may take over-the-counter Mucinex or Flonase nasal spray for congestion.    You may use over-the-counter Cepacol lozenges, Throat Coat tea, tea with honey, salt water gargles, as needed for sore throat.     Tylenol (Acetaminophen) Discharge Instructions:  You may take 2 tablets of regular strength, over-the-counter, Tylenol (acetaminophen) every 4-6 hours as needed for pain.  Take no more than 4000 mg of Tylenol in a 24-hour period.      Avoid taking more than 1 acetaminophen-containing product at a time and be aware that many over-the-counter medications contain a combination of acetaminophen and other products.  If you are taking Tylenol in addition to a combination product please keep track of your daily acetaminophen dose to make sure you do not exceed the recommended 4000 mg.  Taking too much acetaminophen can cause permanent damage to your liver.    Ibuprofen/Naproxen Discharge Instructions:  You may take ibuprofen for pain control.  The maximum dose of (ibuprofen is 3200 mg ) in a 24-hour period.    Take this medication with food to prevent stomach irritation.  With long-term use this medication can irritate the stomach causing pain and lead to development of a stomach ulcer.  If you notice stomach pain or vomiting of coffee-ground colored vomit or blood, please be seen by a healthcare provider.  Attempt to use this medication for the shortest time possible.      Follow-up with your primary care provider for reevaluation later this week - referral placed today.    Return to the emergency department for any new or worsening symptoms including uncontrollable fever/chills, increasing  redness/warmth/swelling of ear or behind ear, neck stiffness, rash, chest pain, shortness of breath, coughing up blood, or any other concerning symptoms.    Take Care!  - Claudette Israel PA-C

## 2024-03-06 NOTE — PROGRESS NOTES
Clinic Care Coordination Contact  Follow Up Progress Note      Assessment: The pt was recently in the ED, I called to check up on the pt, and help the pt setup a ED follow up. The pt was at [facility] for pharyngitis. I called and talked to the pt, pt stated that she is doing better. She did want a follow up, so I was able to setup for the pt to come in on 03/12/2024 at 11:40am with .    Care Gaps:    Health Maintenance Due   Topic Date Due    DEXA  Never done    MICROALBUMIN  Never done    DIABETIC FOOT EXAM  Never done    ADVANCE CARE PLANNING  Never done    EYE EXAM  Never done    URINALYSIS  Never done    Pneumococcal Vaccine: 65+ Years (1 of 2 - PCV) Never done    DTAP/TDAP/TD IMMUNIZATION (1 - Tdap) Never done    RSV VACCINE (Pregnancy & 60+) (1 - 1-dose 60+ series) Never done    MEDICARE ANNUAL WELLNESS VISIT  Never done    FALL RISK ASSESSMENT  Never done    ZOSTER IMMUNIZATION (2 of 2) 01/20/2020    INFLUENZA VACCINE (1) 09/01/2023    COVID-19 Vaccine (5 - 2023-24 season) 09/01/2023    A1C  12/29/2023    PHQ-2 (once per calendar year)  01/01/2024           Care Plans      Intervention/Education provided during outreach:               Plan:     Care Coordinator will follow up in

## 2024-03-08 ENCOUNTER — MYC REFILL (OUTPATIENT)
Dept: FAMILY MEDICINE | Facility: CLINIC | Age: 84
End: 2024-03-08
Payer: COMMERCIAL

## 2024-03-08 DIAGNOSIS — M54.42 CHRONIC MIDLINE LOW BACK PAIN WITH LEFT-SIDED SCIATICA: ICD-10-CM

## 2024-03-08 DIAGNOSIS — G89.29 CHRONIC MIDLINE LOW BACK PAIN WITH LEFT-SIDED SCIATICA: ICD-10-CM

## 2024-03-08 RX ORDER — HYDROCODONE BITARTRATE AND ACETAMINOPHEN 5; 325 MG/1; MG/1
1 TABLET ORAL 2 TIMES DAILY
Qty: 60 TABLET | Refills: 0 | Status: SHIPPED | OUTPATIENT
Start: 2024-03-09 | End: 2024-04-10

## 2024-03-12 ENCOUNTER — OFFICE VISIT (OUTPATIENT)
Dept: FAMILY MEDICINE | Facility: CLINIC | Age: 84
End: 2024-03-12
Payer: COMMERCIAL

## 2024-03-12 VITALS
BODY MASS INDEX: 44.4 KG/M2 | TEMPERATURE: 98 F | SYSTOLIC BLOOD PRESSURE: 137 MMHG | OXYGEN SATURATION: 97 % | RESPIRATION RATE: 22 BRPM | WEIGHT: 292 LBS | HEART RATE: 77 BPM | DIASTOLIC BLOOD PRESSURE: 80 MMHG

## 2024-03-12 DIAGNOSIS — H60.399 ACUTE BACTERIAL OTITIS EXTERNA: ICD-10-CM

## 2024-03-12 DIAGNOSIS — E03.9 HYPOTHYROIDISM, UNSPECIFIED TYPE: ICD-10-CM

## 2024-03-12 DIAGNOSIS — F11.90 CHRONIC, CONTINUOUS USE OF OPIOIDS: ICD-10-CM

## 2024-03-12 DIAGNOSIS — U07.1 INFECTION DUE TO 2019 NOVEL CORONAVIRUS: Primary | ICD-10-CM

## 2024-03-12 PROCEDURE — 99214 OFFICE O/P EST MOD 30 MIN: CPT | Mod: GC

## 2024-03-12 RX ORDER — RESPIRATORY SYNCYTIAL VIRUS VACCINE 120MCG/0.5
0.5 KIT INTRAMUSCULAR ONCE
Qty: 1 EACH | Refills: 0 | Status: CANCELLED | OUTPATIENT
Start: 2024-03-12 | End: 2024-03-12

## 2024-03-12 RX ORDER — NEOMYCIN SULFATE, POLYMYXIN B SULFATE, HYDROCORTISONE 3.5; 10000; 1 MG/ML; [USP'U]/ML; MG/ML
3 SOLUTION/ DROPS AURICULAR (OTIC) 4 TIMES DAILY
Qty: 10 ML | Refills: 0 | Status: SHIPPED | OUTPATIENT
Start: 2024-03-12 | End: 2024-03-22

## 2024-03-12 RX ORDER — LEVOTHYROXINE SODIUM 75 UG/1
75 TABLET ORAL DAILY
Qty: 90 TABLET | Refills: 1 | Status: CANCELLED | OUTPATIENT
Start: 2024-03-12

## 2024-03-12 NOTE — PROGRESS NOTES
Assessment & Plan     Infection due to 2019 novel coronavirus  Diagnosed March 5.  Symptoms overall improving at this point.  Continues to have postnasal drip, hoarse voice.  No ongoing fevers.  Lung clear on exam.  Did discuss option for nasal spray to assist with postnasal drip, deferred today.  Recommend following up if symptoms persist    Acute bacterial otitis externa  Completed course of Ciprodex eardrops, oral cefuroxime.  Still has a little bit of crusting in the bilateral external ears, suspect persistent otitis externa.  No evidence of mastoiditis.  Will prescribe additional course of topical antibiotics.  - neomycin-polymyxin-hydrocortisone (CORTISPORIN) 3.5-30882-3 otic solution  Dispense: 10 mL; Refill: 0    Chronic, continuous use of opioids  Stable on Norco 5-325 2 times daily.  PDMP reviewed.  Resigned chronic pain contract.    Hypothyroidism, unspecified type  Most recent TSH of 2.63.  At goal.  Will continue current dose of levothyroxine.        No follow-ups on file.    Adolfo Freitas is a 83 year old, presenting for the following health issues:  ER F/U (ED stated patient tested positive for COVID, patient stated was not around anyone. Pt stated one month of voice hoarseness. Tested negative for everything but COVID) and Refill Request (Methacarbamol, Synthroid and Tizanidine)    HPI     Patient was seen in the ED on 3/5/2024 for sore throat, congestion,, sinus pressure, ear drainage.  Diagnosed with COVID-19 infection.  Started on cefuroxime and Ciprodex for acute otitis externa/early cellulitis of the ear    Symptoms are improving.  Continues to have significant postnasal drip, hoarse voice.  No fevers.  No chest pain or shortness of breath.  No more sinus pain/pressure.  Continues to have a little bit of crusting in her bilateral ears, much improved.    Hypothyroidism   - TSH 2.3 1 week prior.           Objective    /80   Pulse 77   Temp 98  F (36.7  C) (Oral)   Resp 22   Wt  132.5 kg (292 lb)   SpO2 97%   BMI 44.40 kg/m    Body mass index is 44.4 kg/m .  Physical Exam   General - Comfortable.  No acute distress.  Face -no tenderness palpation of the maxillary sinus.  Lungs - Good air movement. No crackles or wheezing.   Heart - Regular rate and rhythm. No murmur.  Ear - some crusted skin on the outer ear. No drainage. No pain with manipulation of the external ear.  TM appears gray and translucent bilaterally.        Signed Electronically by: Jamie Ritchie MD

## 2024-03-12 NOTE — LETTER

## 2024-04-10 ENCOUNTER — MYC REFILL (OUTPATIENT)
Dept: FAMILY MEDICINE | Facility: CLINIC | Age: 84
End: 2024-04-10
Payer: COMMERCIAL

## 2024-04-10 DIAGNOSIS — G89.4 CHRONIC PAIN SYNDROME: ICD-10-CM

## 2024-04-10 DIAGNOSIS — G89.29 CHRONIC MIDLINE LOW BACK PAIN WITH LEFT-SIDED SCIATICA: ICD-10-CM

## 2024-04-10 DIAGNOSIS — M54.42 CHRONIC MIDLINE LOW BACK PAIN WITH LEFT-SIDED SCIATICA: ICD-10-CM

## 2024-04-10 RX ORDER — HYDROCODONE BITARTRATE AND ACETAMINOPHEN 5; 325 MG/1; MG/1
1 TABLET ORAL 2 TIMES DAILY
Qty: 60 TABLET | Refills: 0 | Status: SHIPPED | OUTPATIENT
Start: 2024-04-12 | End: 2024-05-07

## 2024-04-10 RX ORDER — GABAPENTIN 300 MG/1
600 CAPSULE ORAL EVERY 8 HOURS PRN
Qty: 180 CAPSULE | Refills: 2 | Status: SHIPPED | OUTPATIENT
Start: 2024-04-10 | End: 2024-05-07

## 2024-05-07 ENCOUNTER — MYC REFILL (OUTPATIENT)
Dept: FAMILY MEDICINE | Facility: CLINIC | Age: 84
End: 2024-05-07
Payer: COMMERCIAL

## 2024-05-07 DIAGNOSIS — G89.29 CHRONIC MIDLINE LOW BACK PAIN WITH LEFT-SIDED SCIATICA: ICD-10-CM

## 2024-05-07 DIAGNOSIS — G89.4 CHRONIC PAIN SYNDROME: ICD-10-CM

## 2024-05-07 DIAGNOSIS — E78.5 HYPERLIPIDEMIA LDL GOAL <130: ICD-10-CM

## 2024-05-07 DIAGNOSIS — M54.42 CHRONIC MIDLINE LOW BACK PAIN WITH LEFT-SIDED SCIATICA: ICD-10-CM

## 2024-05-07 DIAGNOSIS — M62.838 MUSCLE SPASM: ICD-10-CM

## 2024-05-08 RX ORDER — HYDROCODONE BITARTRATE AND ACETAMINOPHEN 5; 325 MG/1; MG/1
1 TABLET ORAL 2 TIMES DAILY
Qty: 60 TABLET | Refills: 0 | Status: SHIPPED | OUTPATIENT
Start: 2024-05-12 | End: 2024-06-09

## 2024-05-08 RX ORDER — PRAVASTATIN SODIUM 80 MG/1
80 TABLET ORAL DAILY
Qty: 90 TABLET | Refills: 3 | Status: SHIPPED | OUTPATIENT
Start: 2024-05-08

## 2024-05-08 RX ORDER — GABAPENTIN 300 MG/1
600 CAPSULE ORAL EVERY 8 HOURS PRN
Qty: 180 CAPSULE | Refills: 2 | Status: SHIPPED | OUTPATIENT
Start: 2024-05-08 | End: 2024-06-09

## 2024-05-08 NOTE — TELEPHONE ENCOUNTER
PDMP reviewed. Medication refilled today.     Jamie Ritchie MD PGY-3  [facility]

## 2024-05-13 ENCOUNTER — OFFICE VISIT (OUTPATIENT)
Dept: FAMILY MEDICINE | Facility: CLINIC | Age: 84
End: 2024-05-13
Payer: COMMERCIAL

## 2024-05-13 VITALS
HEIGHT: 68 IN | DIASTOLIC BLOOD PRESSURE: 78 MMHG | RESPIRATION RATE: 24 BRPM | WEIGHT: 293 LBS | BODY MASS INDEX: 44.41 KG/M2 | OXYGEN SATURATION: 96 % | SYSTOLIC BLOOD PRESSURE: 131 MMHG | HEART RATE: 82 BPM

## 2024-05-13 DIAGNOSIS — D75.89 MACROCYTOSIS: ICD-10-CM

## 2024-05-13 DIAGNOSIS — E11.65 TYPE 2 DIABETES MELLITUS WITH HYPERGLYCEMIA, UNSPECIFIED WHETHER LONG TERM INSULIN USE (H): ICD-10-CM

## 2024-05-13 DIAGNOSIS — L30.9 DERMATITIS OF EXTERNAL EAR: ICD-10-CM

## 2024-05-13 DIAGNOSIS — R79.89 ABNORMAL CBC: ICD-10-CM

## 2024-05-13 DIAGNOSIS — E03.9 HYPOTHYROIDISM, UNSPECIFIED TYPE: Primary | ICD-10-CM

## 2024-05-13 LAB
BASOPHILS # BLD AUTO: 0 10E3/UL (ref 0–0.2)
BASOPHILS NFR BLD AUTO: 0 %
EOSINOPHIL # BLD AUTO: 0 10E3/UL (ref 0–0.7)
EOSINOPHIL NFR BLD AUTO: 0 %
ERYTHROCYTE [DISTWIDTH] IN BLOOD BY AUTOMATED COUNT: 13.9 % (ref 10–15)
HBA1C MFR BLD: 5.6 % (ref 0–5.6)
HCT VFR BLD AUTO: 36.3 % (ref 35–47)
HGB BLD-MCNC: 11.5 G/DL (ref 11.7–15.7)
IMM GRANULOCYTES # BLD: 0 10E3/UL
IMM GRANULOCYTES NFR BLD: 0 %
LYMPHOCYTES # BLD AUTO: 4 10E3/UL (ref 0.8–5.3)
LYMPHOCYTES NFR BLD AUTO: 46 %
MCH RBC QN AUTO: 32.6 PG (ref 26.5–33)
MCHC RBC AUTO-ENTMCNC: 31.7 G/DL (ref 31.5–36.5)
MCV RBC AUTO: 103 FL (ref 78–100)
MONOCYTES # BLD AUTO: 1.5 10E3/UL (ref 0–1.3)
MONOCYTES NFR BLD AUTO: 18 %
NEUTROPHILS # BLD AUTO: 3.1 10E3/UL (ref 1.6–8.3)
NEUTROPHILS NFR BLD AUTO: 36 %
NRBC # BLD AUTO: 0 10E3/UL
NRBC BLD AUTO-RTO: 0 /100
PLATELET # BLD AUTO: 144 10E3/UL (ref 150–450)
RBC # BLD AUTO: 3.53 10E6/UL (ref 3.8–5.2)
RETICS # AUTO: 0.06 10E6/UL (ref 0.03–0.1)
RETICS/RBC NFR AUTO: 1.6 % (ref 0.5–2)
TSH SERPL DL<=0.005 MIU/L-ACNC: 2.02 UIU/ML (ref 0.3–4.2)
VIT B12 SERPL-MCNC: 265 PG/ML (ref 232–1245)
WBC # BLD AUTO: 8.6 10E3/UL (ref 4–11)

## 2024-05-13 PROCEDURE — 36415 COLL VENOUS BLD VENIPUNCTURE: CPT

## 2024-05-13 PROCEDURE — 85045 AUTOMATED RETICULOCYTE COUNT: CPT

## 2024-05-13 PROCEDURE — 83921 ORGANIC ACID SINGLE QUANT: CPT

## 2024-05-13 PROCEDURE — 82607 VITAMIN B-12: CPT

## 2024-05-13 PROCEDURE — 99214 OFFICE O/P EST MOD 30 MIN: CPT | Mod: GC

## 2024-05-13 PROCEDURE — 84443 ASSAY THYROID STIM HORMONE: CPT

## 2024-05-13 PROCEDURE — 85025 COMPLETE CBC W/AUTO DIFF WBC: CPT

## 2024-05-13 PROCEDURE — 99207 BLOOD MORPHOLOGY PATHOLOGIST REVIEW: CPT | Performed by: PATHOLOGY

## 2024-05-13 PROCEDURE — 83036 HEMOGLOBIN GLYCOSYLATED A1C: CPT

## 2024-05-13 RX ORDER — KETOCONAZOLE 20 MG/G
CREAM TOPICAL DAILY
Qty: 60 G | Refills: 1 | Status: SHIPPED | OUTPATIENT
Start: 2024-05-13

## 2024-05-13 RX ORDER — RESPIRATORY SYNCYTIAL VIRUS VACCINE 120MCG/0.5
0.5 KIT INTRAMUSCULAR ONCE
Qty: 1 EACH | Refills: 0 | Status: CANCELLED | OUTPATIENT
Start: 2024-05-13 | End: 2024-05-13

## 2024-05-13 RX ORDER — HYDROCORTISONE 25 MG/G
OINTMENT TOPICAL 2 TIMES DAILY
Qty: 30 G | Refills: 1 | Status: SHIPPED | OUTPATIENT
Start: 2024-05-13 | End: 2024-07-02

## 2024-05-13 NOTE — PROGRESS NOTES
Assessment & Plan     Hypothyroidism, unspecified type  Have been slowly cutting back on levothyroxine dose.  Most recent TSH near goal while on 75 mcg.  Will plan to repeat today.  Overall feels well.  - TSH with free T4 reflex  - TSH with free T4 reflex  - Continue Levothyroxine 75mcg.     Dermatitis of external ear  Ongoing for about 1 years. Did respond to oral antibiotics/otic antibiotic drops. With persistent inflammation, will try topical steroid/antifungal and see how it responds   - hydrocortisone 2.5 % ointment  Dispense: 30 g; Refill: 1  - ketoconazole (NIZORAL) 2 % external cream  Dispense: 60 g; Refill: 1   - If not improvement, will retry ciprodex (prescribed independently due to cost)   - Consider fungal/bacterial culture    Macrocytosis  Persistent, history of B12 deficiency. Will repeat b12 and collect MMA level. Will also do a smear with additional abnormalities as below.  Would be agreeable to start B12 injections if indicated.   - Vitamin B12  - Methylmalonic Acid  - Lab Blood Morphology Pathologist Review      Abnormal CBC  Smudge cells noted on recent CBC differential, also with persistent monocytosis. Will collect smear for pathology review  - Follow-up pending results.       Type 2 diabetes mellitus (H)  Diet controled, due for A1c  - HEMOGLOBIN A1C              No follow-ups on file.    Adolfo Freitas is a 83 year old, presenting for the following health issues:  Follow Up (HTN)    HPI       Bilateral ear crusting  - Diagnosed as otitis externa   - Has been going on for at least 1 year   - Has been prescribed ear drops   - Ciprodex worked well, Cortisporin did not work as well.   - Had mostly resolved after oral Abx from ED visit in March  - Has started topical triple antibiotic, helped a little       Macrocytosis  - Ongoing, history of B12 deficiency. Used to take B12 injections. Normal B12 level in the past.   - Taking oral B12 occasionally in multi vitamin.   - Smudge cells noted on  "recent CBC with Diff    Hypothyroidism   - TSH - 2.63   - Levothyroxine 75mcg daily   - Feeling well at this dose  - Has gained weight since moving to [place], attributes it to decreased activity level            Objective    /78 (BP Location: Left arm, Patient Position: Sitting)   Pulse 82   Resp 24   Ht 1.727 m (5' 8\")   Wt 136.5 kg (301 lb)   SpO2 96%   BMI 45.77 kg/m    Body mass index is 45.77 kg/m .  Physical Exam   Constitutional: Conversant, well developed. No acute distress  Eyes: Anicteric sclerae, no lid lag  Respiratory: Normal respiratory effort  Skin: Bilateral external ears with dry crusted skin. More significant on the right tragus. No obvious discharge. TM appear grey and translucent bilaterally.   Psych: Intact judgment and insight. Alert and oriented x3. Cordial affect       Signed Electronically by: Jamie Rithcie MD    "

## 2024-05-13 NOTE — PROGRESS NOTES
Patient declined a BP 2 week follow up, per pt BP is normal usually always high at first BP taken. Pt aware has appt in July with Shawn.

## 2024-05-13 NOTE — PROGRESS NOTES
Preceptor Attestation:   Patient seen, evaluated and discussed with the resident. I have verified the content of the note, which accurately reflects my assessment of the patient and the plan of care.    Supervising Physician:Marya Mon MD    [facility]

## 2024-05-14 LAB
PATH REPORT.COMMENTS IMP SPEC: NORMAL
PATH REPORT.COMMENTS IMP SPEC: NORMAL
PATH REPORT.FINAL DX SPEC: NORMAL

## 2024-05-15 LAB — METHYLMALONATE SERPL-SCNC: 0.38 UMOL/L (ref 0–0.4)

## 2024-05-16 ENCOUNTER — TELEPHONE (OUTPATIENT)
Dept: FAMILY MEDICINE | Facility: CLINIC | Age: 84
End: 2024-05-16
Payer: COMMERCIAL

## 2024-05-16 NOTE — TELEPHONE ENCOUNTER
Called patient to discuss lab results, CBC with mild anemia, [id number] and slight thrombocytopenia. Pathology review demonstrating macrocytic anemia, persistent mild monocytosis, and borderline lymphocytosis.    Vitamin b12 level was normal, MMA level also normal as well, making B12 deficiency as the etiology of macrocytosis less likely. Normal folate in the past.     With these ongoing abnormalities, we did discuss the referral to hematology for further evaluation to assist with additional work-up.  She would prefer to recheck blood counts in July. If persistent abnormalities, she would be agreeable to a Hematology referral.    Otherwise, TSH within normal limits, will continue current levothyroxine dose. A1c was normal.       Started using topical steroid/ketoconazole on the ears, is having some improvement. She will reach out if not resolved.    Jamie Ritchie MD PGY-3  [facility]

## 2024-06-09 ENCOUNTER — MYC REFILL (OUTPATIENT)
Dept: FAMILY MEDICINE | Facility: CLINIC | Age: 84
End: 2024-06-09
Payer: COMMERCIAL

## 2024-06-09 DIAGNOSIS — G89.29 CHRONIC MIDLINE LOW BACK PAIN WITH LEFT-SIDED SCIATICA: ICD-10-CM

## 2024-06-09 DIAGNOSIS — M54.42 CHRONIC MIDLINE LOW BACK PAIN WITH LEFT-SIDED SCIATICA: ICD-10-CM

## 2024-06-09 DIAGNOSIS — G89.4 CHRONIC PAIN SYNDROME: ICD-10-CM

## 2024-06-09 DIAGNOSIS — E03.9 HYPOTHYROIDISM, UNSPECIFIED TYPE: ICD-10-CM

## 2024-06-11 RX ORDER — GABAPENTIN 300 MG/1
600 CAPSULE ORAL EVERY 8 HOURS PRN
Qty: 180 CAPSULE | Refills: 2 | Status: SHIPPED | OUTPATIENT
Start: 2024-06-11 | End: 2024-09-17

## 2024-06-11 RX ORDER — LEVOTHYROXINE SODIUM 75 UG/1
75 TABLET ORAL DAILY
Qty: 90 TABLET | Refills: 1 | Status: SHIPPED | OUTPATIENT
Start: 2024-06-11 | End: 2024-09-17

## 2024-06-11 RX ORDER — HYDROCODONE BITARTRATE AND ACETAMINOPHEN 5; 325 MG/1; MG/1
1 TABLET ORAL 2 TIMES DAILY
Qty: 60 TABLET | Refills: 0 | Status: SHIPPED | OUTPATIENT
Start: 2024-06-11 | End: 2024-07-02

## 2024-07-02 ENCOUNTER — OFFICE VISIT (OUTPATIENT)
Dept: FAMILY MEDICINE | Facility: CLINIC | Age: 84
End: 2024-07-02
Payer: COMMERCIAL

## 2024-07-02 VITALS
OXYGEN SATURATION: 95 % | TEMPERATURE: 98.9 F | HEIGHT: 68 IN | BODY MASS INDEX: 43.95 KG/M2 | SYSTOLIC BLOOD PRESSURE: 116 MMHG | DIASTOLIC BLOOD PRESSURE: 74 MMHG | RESPIRATION RATE: 14 BRPM | HEART RATE: 67 BPM | WEIGHT: 290 LBS

## 2024-07-02 DIAGNOSIS — H60.543 DERMATITIS OF EAR CANAL, BILATERAL: Primary | ICD-10-CM

## 2024-07-02 DIAGNOSIS — F11.20 CONTINUOUS OPIOID DEPENDENCE (H): ICD-10-CM

## 2024-07-02 DIAGNOSIS — E03.9 HYPOTHYROIDISM, UNSPECIFIED TYPE: ICD-10-CM

## 2024-07-02 DIAGNOSIS — M54.42 CHRONIC MIDLINE LOW BACK PAIN WITH LEFT-SIDED SCIATICA: ICD-10-CM

## 2024-07-02 DIAGNOSIS — D75.89 MACROCYTOSIS: ICD-10-CM

## 2024-07-02 DIAGNOSIS — D72.821 MONOCYTOSIS: ICD-10-CM

## 2024-07-02 DIAGNOSIS — G89.29 CHRONIC MIDLINE LOW BACK PAIN WITH LEFT-SIDED SCIATICA: ICD-10-CM

## 2024-07-02 PROBLEM — E11.9 TYPE 2 DIABETES MELLITUS (H): Status: RESOLVED | Noted: 2019-06-11 | Resolved: 2024-07-02

## 2024-07-02 PROBLEM — N18.2 CHRONIC KIDNEY DISEASE, STAGE 2 (MILD): Status: ACTIVE | Noted: 2018-10-09

## 2024-07-02 PROBLEM — C55 ADENOCARCINOMA OF UTERUS (H): Status: RESOLVED | Noted: 2018-06-27 | Resolved: 2024-07-02

## 2024-07-02 LAB
BASOPHILS # BLD AUTO: 0 10E3/UL (ref 0–0.2)
BASOPHILS NFR BLD AUTO: 0 %
EOSINOPHIL # BLD AUTO: 0 10E3/UL (ref 0–0.7)
EOSINOPHIL NFR BLD AUTO: 0 %
ERYTHROCYTE [DISTWIDTH] IN BLOOD BY AUTOMATED COUNT: 13.3 % (ref 10–15)
HCT VFR BLD AUTO: 39.7 % (ref 35–47)
HGB BLD-MCNC: 12.4 G/DL (ref 11.7–15.7)
IMM GRANULOCYTES # BLD: 0 10E3/UL
IMM GRANULOCYTES NFR BLD: 0 %
LYMPHOCYTES # BLD AUTO: 4.5 10E3/UL (ref 0.8–5.3)
LYMPHOCYTES NFR BLD AUTO: 43 %
MCH RBC QN AUTO: 32.3 PG (ref 26.5–33)
MCHC RBC AUTO-ENTMCNC: 31.2 G/DL (ref 31.5–36.5)
MCV RBC AUTO: 103 FL (ref 78–100)
MONOCYTES # BLD AUTO: 2 10E3/UL (ref 0–1.3)
MONOCYTES NFR BLD AUTO: 19 %
NEUTROPHILS # BLD AUTO: 3.8 10E3/UL (ref 1.6–8.3)
NEUTROPHILS NFR BLD AUTO: 37 %
PLATELET # BLD AUTO: 140 10E3/UL (ref 150–450)
RBC # BLD AUTO: 3.84 10E6/UL (ref 3.8–5.2)
WBC # BLD AUTO: 10.4 10E3/UL (ref 4–11)

## 2024-07-02 PROCEDURE — 84439 ASSAY OF FREE THYROXINE: CPT | Performed by: FAMILY MEDICINE

## 2024-07-02 PROCEDURE — G2211 COMPLEX E/M VISIT ADD ON: HCPCS | Performed by: FAMILY MEDICINE

## 2024-07-02 PROCEDURE — 85025 COMPLETE CBC W/AUTO DIFF WBC: CPT | Performed by: FAMILY MEDICINE

## 2024-07-02 PROCEDURE — 36415 COLL VENOUS BLD VENIPUNCTURE: CPT | Performed by: FAMILY MEDICINE

## 2024-07-02 PROCEDURE — 84443 ASSAY THYROID STIM HORMONE: CPT | Performed by: FAMILY MEDICINE

## 2024-07-02 PROCEDURE — 99214 OFFICE O/P EST MOD 30 MIN: CPT | Performed by: FAMILY MEDICINE

## 2024-07-02 RX ORDER — HYDROCODONE BITARTRATE AND ACETAMINOPHEN 5; 325 MG/1; MG/1
1 TABLET ORAL 2 TIMES DAILY
Qty: 60 TABLET | Refills: 0 | Status: SHIPPED | OUTPATIENT
Start: 2024-07-15 | End: 2024-08-12

## 2024-07-02 RX ORDER — TRIAMCINOLONE ACETONIDE 1 MG/G
CREAM TOPICAL 2 TIMES DAILY
Qty: 15 G | Refills: 1 | Status: SHIPPED | OUTPATIENT
Start: 2024-07-02 | End: 2024-09-03

## 2024-07-02 RX ORDER — NEOMYCIN SULFATE, POLYMYXIN B SULFATE AND HYDROCORTISONE 10; 3.5; 1 MG/ML; MG/ML; [USP'U]/ML
4 SUSPENSION/ DROPS AURICULAR (OTIC) 4 TIMES DAILY
Qty: 10 ML | Refills: 0 | Status: SHIPPED | OUTPATIENT
Start: 2024-07-02 | End: 2024-07-02

## 2024-07-02 RX ORDER — CIPROFLOXACIN AND DEXAMETHASONE 3; 1 MG/ML; MG/ML
4 SUSPENSION/ DROPS AURICULAR (OTIC) 2 TIMES DAILY
Qty: 7.5 ML | Refills: 0 | Status: SHIPPED | OUTPATIENT
Start: 2024-07-02 | End: 2024-07-12

## 2024-07-02 NOTE — PATIENT INSTRUCTIONS
Start using ear drops, cortisporin, in both ears as directed on the bottle.    Start using triamcinolone cream only to the external ear and lateral cheek on the left side.      Continue ketoconazole cream to external ear.      Stop hydrocortisone.

## 2024-07-02 NOTE — PROGRESS NOTES
Assessment & Plan     Dermatitis of ear canal, bilateral  Chronic, ongoing dermatitis of R>L external ear.  Consider bacterial vs fungal vs allergic.    - triamcinolone (KENALOG) 0.1 % external cream; Apply topically 2 times daily.  Apply to only external ear and face.   - ciprofloxacin-dexAMETHasone (CIPRODEX) 0.3-0.1 % otic suspension; Place 4 drops into both ears 2 times daily for 10 days  - if not improving, will biopsy rash of face.  Consider culture of ear canal as well as ENT referral.      Monocytosis  Macrocytosis  Persistent monocytosis with mildly low platelets and slight lymphocytosis per last peripheral smear.    Normal B12 and folate levels.    - Adult Oncology/Hematology  Referral; Future    Hypothyroidism, unspecified type  Taking levothyroxine 75 mcg daily.  - TSH with free T4 reflex    Chronic midline low back pain with left-sided sciatica  Continuous opioid dependence (H)  Has been taking opioid for over 20 years for her chronic low back pain. Due for refill by 7/15/24.  Addressed by Dr. Ritchie in past as well.   - HYDROcodone-acetaminophen (NORCO) 5-325 MG tablet; Take 1 tablet by mouth 2 times daily for 30 days    Return in about 2 weeks (around 7/16/2024).    Sanjana Escobar MD    =================================The longitudinal plan of care for the diagnosis(es)/condition(s) as documented were addressed during this visit. Due to the added complexity in care, I will continue to support Zena in the subsequent management and with ongoing continuity of care.    Subjective   Zena is a 83 year old, presenting for the following health issues:  RECHECK (Thyroid ) and Ear Problem (Infection in Both ear. No more itching )        7/2/2024     2:49 PM   Additional Questions   Roomed by Masha bassett         7/2/2024    Information    services provided? No      Prior PCP was Dr. Ritchie whom she last saw on 5/13/24.    Has a medical history significant for  hypertension, hyperlipidemia, hypothyroidism, adenocarcinoma of the uterus s/p hysterectomy in 2017/18, spinal stenosis, chronic back pain, chronic opioid use.    Appt today to recheck ear dermatitis, thyroid disorder and recheck CBC.     Per review of recent notes and labs, pt has macrocytosis with normal vitamin B12 and folate, thrombocytopenia, persistent mild monocytosis and borderline lymphocytosis.  Patient says she has a history of anemia in the past.  Was very low at one point and took iron.  Has normal energy levels.      Concerned about her bilateral ear dermatitis which has been intermittent for several months.  Was seen in ER on 3/5 and cipro-dex ordered but was too expensive for patient.  Tried cortisporin without improvement.  Has been since using nizoral and HC 2.5% with improvement of left ear but not right ear.  In fact, the rash on the right ear spread towards face on right side about one month ago.  It is itchy unless she uses the HC cream.  Does have a little drainage on left side as well.        Patient Active Problem List   Diagnosis    Bloody discharge from right nipple    Chronic kidney disease, stage 2 (mild)    History of cataract    Neoplasm of uncertain behavior of skin    Sciatica    Spinal stenosis of lumbar region    Vitamin D deficiency    Morbid obesity (H)    Continuous opioid dependence (H)     Current Outpatient Medications   Medication Sig Dispense Refill    ciprofloxacin-dexAMETHasone (CIPRODEX) 0.3-0.1 % otic suspension Place 4 drops into both ears 2 times daily for 10 days 7.5 mL 0    [START ON 7/15/2024] HYDROcodone-acetaminophen (NORCO) 5-325 MG tablet Take 1 tablet by mouth 2 times daily for 30 days 60 tablet 0    triamcinolone (KENALOG) 0.1 % external cream Apply topically 2 times daily 15 g 1    gabapentin (NEURONTIN) 300 MG capsule Take 2 capsules (600 mg) by mouth every 8 hours as needed for neuropathic pain 180 capsule 2    hydrocortisone 2.5 % ointment Apply topically  2 times daily To the outer ear canal 30 g 1    ketoconazole (NIZORAL) 2 % external cream Apply topically daily Apply to ears daily 60 g 1    levothyroxine (SYNTHROID/LEVOTHROID) 75 MCG tablet Take 1 tablet (75 mcg) by mouth daily 90 tablet 1    naloxone (NARCAN) 4 MG/0.1ML nasal spray Spray 1 spray (4 mg) into one nostril alternating nostrils once as needed for opioid reversal every 2-3 minutes until assistance arrives 0.2 mL 0    pravastatin (PRAVACHOL) 80 MG tablet Take 1 tablet (80 mg) by mouth daily 90 tablet 3    tiZANidine (ZANAFLEX) 4 MG tablet Take 1 tablet (4 mg) by mouth daily as needed for muscle spasms 30 tablet 1     No current facility-administered medications for this visit.     Patient Active Problem List   Diagnosis    Bloody discharge from right nipple    Chronic kidney disease, stage 2 (mild)    History of cataract    Neoplasm of uncertain behavior of skin    Sciatica    Spinal stenosis of lumbar region    Vitamin D deficiency    Morbid obesity (H)    Continuous opioid dependence (H)     Current Outpatient Medications   Medication Sig Dispense Refill    ciprofloxacin-dexAMETHasone (CIPRODEX) 0.3-0.1 % otic suspension Place 4 drops into both ears 2 times daily for 10 days 7.5 mL 0    [START ON 7/15/2024] HYDROcodone-acetaminophen (NORCO) 5-325 MG tablet Take 1 tablet by mouth 2 times daily for 30 days 60 tablet 0    triamcinolone (KENALOG) 0.1 % external cream Apply topically 2 times daily 15 g 1    gabapentin (NEURONTIN) 300 MG capsule Take 2 capsules (600 mg) by mouth every 8 hours as needed for neuropathic pain 180 capsule 2    ketoconazole (NIZORAL) 2 % external cream Apply topically daily Apply to ears daily 60 g 1    levothyroxine (SYNTHROID/LEVOTHROID) 75 MCG tablet Take 1 tablet (75 mcg) by mouth daily 90 tablet 1    naloxone (NARCAN) 4 MG/0.1ML nasal spray Spray 1 spray (4 mg) into one nostril alternating nostrils once as needed for opioid reversal every 2-3 minutes until assistance  "arrives 0.2 mL 0    pravastatin (PRAVACHOL) 80 MG tablet Take 1 tablet (80 mg) by mouth daily 90 tablet 3    tiZANidine (ZANAFLEX) 4 MG tablet Take 1 tablet (4 mg) by mouth daily as needed for muscle spasms 30 tablet 1     No current facility-administered medications for this visit.               Objective    /74   Pulse 67   Temp 98.9  F (37.2  C) (Tympanic)   Resp 14   Ht 1.727 m (5' 8\")   Wt 131.5 kg (290 lb)   SpO2 95%   BMI 44.09 kg/m    Body mass index is 44.09 kg/m .  Physical Exam   GENERAL: alert and no distress  HENT: normal cephalic/atraumatic, nose and mouth without ulcers or lesions, oropharynx clear, and oral mucous membranes moist.  Left ear with very mild crusting/scaling of external ear.  Ear canal normal.  Right ear with scaling, excoriated rash extending from pinna to right lateral face.  Ear canal on right with mild white discharge and slight edema.  TM intact, normal.      Results for orders placed or performed in visit on 07/02/24 (from the past 24 hour(s))   CBC with Platelets & Differential    Narrative    The following orders were created for panel order CBC with Platelets & Differential.  Procedure                               Abnormality         Status                     ---------                               -----------         ------                     CBC with platelets and d...[[id number]]  Abnormal            Final result                 Please view results for these tests on the individual orders.   CBC with platelets and differential   Result Value Ref Range    WBC Count 10.4 4.0 - 11.0 10e3/uL    RBC Count 3.84 3.80 - 5.20 10e6/uL    Hemoglobin 12.4 11.7 - 15.7 g/dL    Hematocrit 39.7 35.0 - 47.0 %     (H) 78 - 100 fL    MCH 32.3 26.5 - 33.0 pg    MCHC 31.2 (L) 31.5 - 36.5 g/dL    RDW 13.3 10.0 - 15.0 %    Platelet Count 140 (L) 150 - 450 10e3/uL    % Neutrophils 37 %    % Lymphocytes 43 %    % Monocytes 19 %    % Eosinophils 0 %    % Basophils 0 %    % " Immature Granulocytes 0 %    Absolute Neutrophils 3.8 1.6 - 8.3 10e3/uL    Absolute Lymphocytes 4.5 0.8 - 5.3 10e3/uL    Absolute Monocytes 2.0 (H) 0.0 - 1.3 10e3/uL    Absolute Eosinophils 0.0 0.0 - 0.7 10e3/uL    Absolute Basophils 0.0 0.0 - 0.2 10e3/uL    Absolute Immature Granulocytes 0.0 <=0.4 10e3/uL       Collected 5/13/2024 10:23 AM       Status: Final result       Dx: Macrocytosis        Component    Final Diagnosis   Peripheral blood  - Macrocytic anemia  - Mild thrombocytopenia versus pseudothrombocytopenia (history of prior platelet clumps)  - Persistent mild monocytosis now with borderline lymphocytosis           TSH   Date Value Ref Range Status   05/13/2024 2.02 0.30 - 4.20 uIU/mL Final             Signed Electronically by: Sanjana Escobar MD

## 2024-07-03 ENCOUNTER — PATIENT OUTREACH (OUTPATIENT)
Dept: ONCOLOGY | Facility: CLINIC | Age: 84
End: 2024-07-03
Payer: COMMERCIAL

## 2024-07-03 LAB
T4 FREE SERPL-MCNC: 1.07 NG/DL (ref 0.9–1.7)
TSH SERPL DL<=0.005 MIU/L-ACNC: 4.26 UIU/ML (ref 0.3–4.2)

## 2024-07-03 NOTE — PROGRESS NOTES
New Patient Oncology Nurse Navigator Note     Referring provider: Sanjana Escobar MD      Referring Clinic/Organization: [facility]      Referred to (specialty:) Hematology     Requested provider (if applicable): NA     Date Referral Received: July 2, 2024     Evaluation for:  D72.821 (ICD-10-CM) - Monocytosis      Clinical History (per Nurse review of records provided):      Patient was seen yesterday in follow up by Dr. Sanjana Escobar.  Patient was noted to have:    Monocytosis  Macrocytosis  Persistent monocytosis with mildly low platelets and slight lymphocytosis per last peripheral smear.    Normal B12 and folate levels.    - Adult Oncology/Hematology  Referral; Future     Latest Reference Range & Units 05/13/24 10:23 07/02/24 14:45   WBC 4.0 - 11.0 10e3/uL 8.6 10.4   Hemoglobin 11.7 - 15.7 g/dL 11.5 (L) 12.4   Hematocrit 35.0 - 47.0 % 36.3 39.7   Platelet Count 150 - 450 10e3/uL 144 (L) 140 (L)   RBC Count 3.80 - 5.20 10e6/uL 3.53 (L) 3.84   MCV 78 - 100 fL 103 (H) 103 (H)   MCH 26.5 - 33.0 pg 32.6 32.3   MCHC 31.5 - 36.5 g/dL 31.7 31.2 (L)   RDW 10.0 - 15.0 % 13.9 13.3   % Neutrophils % 36 37   % Lymphocytes % 46 43   % Monocytes % 18 19   % Eosinophils % 0 0   % Basophils % 0 0   Absolute Basophils 0.0 - 0.2 10e3/uL 0.0 0.0   Absolute Eosinophils 0.0 - 0.7 10e3/uL 0.0 0.0   Absolute Immature Granulocytes <=0.4 10e3/uL 0.0 0.0   Absolute Lymphocytes 0.8 - 5.3 10e3/uL 4.0 4.5   Absolute Monocytes 0.0 - 1.3 10e3/uL 1.5 (H) 2.0 (H)   % Immature Granulocytes % 0 0   Absolute Neutrophils 1.6 - 8.3 10e3/uL 3.1 3.8   Absolute NRBCs 10e3/uL 0.0    NRBCs per 100 WBC <1 /100 0    (L): Data is abnormally low  (H): Data is abnormally high      Bld morphology pathology review: [id number]  Order: [id number] - Part of Panel Order [id number]  Collected 5/13/2024 10:23 AM       Status: Final result       Visible to patient: Yes (seen)       Dx: Macrocytosis    1  Result Note      Component    Final Diagnosis   Peripheral blood  - Macrocytic anemia  - Mild thrombocytopenia versus pseudothrombocytopenia (history of prior platelet clumps)  - Persistent mild monocytosis now with borderline lymphocytosis   Electronically signed by Bulmaro Gaspar MD on 5/14/2024 at  9:42 AM   Comment    The clinical history has been reviewed.  We can certainly attempt flow cytometry ([facility]) and/or FISH ([facility]) studies on the current specimen if requested.  Platelets are borderline low with poorly formed platelet clumps.     Platelet clumping may sometimes be seen in association with cold agglutinins as a reaction with EDTA, as a result of a traumatic venipuncture, or due to incomplete mixing of the specimen. If platelet clumping persists in future peripheral smears, consideration of collection in a sodium citrate tube or as a pre-warmed specimen is suggested.              Records Location: See Bookmarked material     Records Needed: NA     Additional testing needed prior to consult: NA    Payor: [facility] / Plan: [facility] MEDICARE ADVANTAGE / Product Type: Medicare /     July 3, 2024  Referral received and reviewed.   Sent to NPS to process.    Lashonda LINDSAYN, RN   Oncology Nurse Navigator   [facility]   [phone] / [phone]

## 2024-07-16 ENCOUNTER — OFFICE VISIT (OUTPATIENT)
Dept: FAMILY MEDICINE | Facility: CLINIC | Age: 84
End: 2024-07-16
Payer: COMMERCIAL

## 2024-07-16 VITALS
OXYGEN SATURATION: 94 % | DIASTOLIC BLOOD PRESSURE: 82 MMHG | TEMPERATURE: 98.9 F | HEART RATE: 74 BPM | HEIGHT: 68 IN | BODY MASS INDEX: 44.1 KG/M2 | RESPIRATION RATE: 18 BRPM | WEIGHT: 291 LBS | SYSTOLIC BLOOD PRESSURE: 136 MMHG

## 2024-07-16 DIAGNOSIS — H60.543 DERMATITIS OF EAR CANAL, BILATERAL: Primary | ICD-10-CM

## 2024-07-16 DIAGNOSIS — D72.821 MONOCYTOSIS: ICD-10-CM

## 2024-07-16 DIAGNOSIS — E03.9 HYPOTHYROIDISM, UNSPECIFIED TYPE: ICD-10-CM

## 2024-07-16 PROCEDURE — 99213 OFFICE O/P EST LOW 20 MIN: CPT | Performed by: FAMILY MEDICINE

## 2024-07-16 NOTE — PROGRESS NOTES
Assessment & Plan     Dermatitis of ear canal, bilateral - resolved  Now resolved after course of cortisporin for ear canal as well as triamcinolone and ketoconazole for external ear.  Pt reports improvement after eliminating peanut butter from diet as well.      Monocytosis  Also has macrocytosis and mild thrombocytopenia  Pt has upcoming appt on 8/22 with heme/onc.  She wonders if labs may be improved now that her chronic ear infection has resolved.   - recheck CBC in 1 mo.  If normal, will cancel referral    Hypothyroidism, unspecified type  Mild elevation of TSH.  No change in levothyroxine at this time.    - recheck 1 mo    Healthcare maintenance   Due for immunizations and DEXA per chart, but pt may have had these done in [place]  - msg to HIM to review outside records.    Lab only in 1 mo.  Will notify pt of results so she can cancel heme appt if needed    Sanjana Escobar MD      Adolfo Freitas is a 83 year old, presenting for the following health issues:  RECHECK (Skin )        7/16/2024     2:03 PM   Additional Questions   Roomed by Urban   Accompanied by Patrica         7/16/2024    Information    services provided? No        Has a medical history significant for hypertension, hyperlipidemia, hypothyroidism, adenocarcinoma of the uterus s/p hysterectomy in 2017/18, spinal stenosis, chronic back pain, chronic opioid use for back pain.    She is here today to follow-up on her chronic otitis externa.  At her visit 2 weeks ago, she was instructed to use Cortisporin eardrops as well as apply triamcinolone and ketoconazole to the rash on the external ear.  She completed this treatment plan and her symptoms have resolved.  She said they have been ongoing for 2 years and family resolved.  She reports she also stopped eating peanut butter and wonders if this also contributed to the resolution of the rash.    She has made appointment with heme/for August 22.  This is for evaluation of  "her mild anemia, thrombocytopenia, elevated monocytes.  She has been feeling well and has had no recent illnesses.  Last viral illness was in March when she had COVID.        Objective    /82   Pulse 74   Temp 98.9  F (37.2  C)   Resp 18   Ht 1.727 m (5' 8\")   Wt 132 kg (291 lb)   SpO2 94%   BMI 44.25 kg/m    Body mass index is 44.25 kg/m .  Physical Exam   GENERAL: alert and no distress  HENT: ear canals and TM's normal, nose and mouth without ulcers or lesions  SKIN: no suspicious lesions or rashes    Office Visit on 07/02/2024   Component Date Value Ref Range Status    TSH 07/02/2024 4.26 (H)  0.30 - 4.20 uIU/mL Final    WBC Count 07/02/2024 10.4  4.0 - 11.0 10e3/uL Final    RBC Count 07/02/2024 3.84  3.80 - 5.20 10e6/uL Final    Hemoglobin 07/02/2024 12.4  11.7 - 15.7 g/dL Final    Hematocrit 07/02/2024 39.7  35.0 - 47.0 % Final    MCV 07/02/2024 103 (H)  78 - 100 fL Final    MCH 07/02/2024 32.3  26.5 - 33.0 pg Final    MCHC 07/02/2024 31.2 (L)  31.5 - 36.5 g/dL Final    RDW 07/02/2024 13.3  10.0 - 15.0 % Final    Platelet Count 07/02/2024 140 (L)  150 - 450 10e3/uL Final    % Neutrophils 07/02/2024 37  % Final    % Lymphocytes 07/02/2024 43  % Final    % Monocytes 07/02/2024 19  % Final    % Eosinophils 07/02/2024 0  % Final    % Basophils 07/02/2024 0  % Final    % Immature Granulocytes 07/02/2024 0  % Final    Absolute Neutrophils 07/02/2024 3.8  1.6 - 8.3 10e3/uL Final    Absolute Lymphocytes 07/02/2024 4.5  0.8 - 5.3 10e3/uL Final    Absolute Monocytes 07/02/2024 2.0 (H)  0.0 - 1.3 10e3/uL Final    Absolute Eosinophils 07/02/2024 0.0  0.0 - 0.7 10e3/uL Final    Absolute Basophils 07/02/2024 0.0  0.0 - 0.2 10e3/uL Final    Absolute Immature Granulocytes 07/02/2024 0.0  <=0.4 10e3/uL Final    Free T4 07/02/2024 1.07  0.90 - 1.70 ng/dL Final           Signed Electronically by: Sanjana Escobar MD    "

## 2024-08-12 ENCOUNTER — MYC REFILL (OUTPATIENT)
Dept: FAMILY MEDICINE | Facility: CLINIC | Age: 84
End: 2024-08-12
Payer: COMMERCIAL

## 2024-08-12 DIAGNOSIS — M54.42 CHRONIC MIDLINE LOW BACK PAIN WITH LEFT-SIDED SCIATICA: ICD-10-CM

## 2024-08-12 DIAGNOSIS — G89.29 CHRONIC MIDLINE LOW BACK PAIN WITH LEFT-SIDED SCIATICA: ICD-10-CM

## 2024-08-12 RX ORDER — HYDROCODONE BITARTRATE AND ACETAMINOPHEN 5; 325 MG/1; MG/1
1 TABLET ORAL 2 TIMES DAILY
Qty: 60 TABLET | Refills: 0 | Status: SHIPPED | OUTPATIENT
Start: 2024-08-16 | End: 2024-09-17

## 2024-08-12 NOTE — TELEPHONE ENCOUNTER
Rx refill requested is outside of RN protocol, will route to PCP to review and further complete action. Thank you. Shashi ABRAHAM

## 2024-08-12 NOTE — TELEPHONE ENCOUNTER
Reviewed PDMP.  Hydrocodone-acetaminophen last filled 7/16/24 for 30 days  New rx sent for 8/16/24 for 1 mo    Orders Placed This Encounter   Medications    HYDROcodone-acetaminophen (NORCO) 5-325 MG tablet     Sig: Take 1 tablet by mouth 2 times daily for 30 days     Dispense:  60 tablet     Refill:  0       Sanjana Escobar MD

## 2024-08-13 ENCOUNTER — LAB (OUTPATIENT)
Dept: LAB | Facility: CLINIC | Age: 84
End: 2024-08-13
Payer: COMMERCIAL

## 2024-08-13 DIAGNOSIS — D72.821 MONOCYTOSIS: ICD-10-CM

## 2024-08-13 DIAGNOSIS — E03.9 HYPOTHYROIDISM, UNSPECIFIED TYPE: ICD-10-CM

## 2024-08-13 LAB
ERYTHROCYTE [DISTWIDTH] IN BLOOD BY AUTOMATED COUNT: 13.8 % (ref 10–15)
HCT VFR BLD AUTO: 39.9 % (ref 35–47)
HGB BLD-MCNC: 12.8 G/DL (ref 11.7–15.7)
MCH RBC QN AUTO: 31.8 PG (ref 26.5–33)
MCHC RBC AUTO-ENTMCNC: 32.1 G/DL (ref 31.5–36.5)
MCV RBC AUTO: 99 FL (ref 78–100)
NRBC # BLD AUTO: 0 10E3/UL
NRBC BLD AUTO-RTO: 0 /100
PLATELET # BLD AUTO: 144 10E3/UL (ref 150–450)
RBC # BLD AUTO: 4.02 10E6/UL (ref 3.8–5.2)
WBC # BLD AUTO: 12.9 10E3/UL (ref 4–11)

## 2024-08-13 PROCEDURE — 36415 COLL VENOUS BLD VENIPUNCTURE: CPT

## 2024-08-13 PROCEDURE — 85027 COMPLETE CBC AUTOMATED: CPT

## 2024-08-13 PROCEDURE — 84443 ASSAY THYROID STIM HORMONE: CPT

## 2024-08-13 PROCEDURE — 85007 BL SMEAR W/DIFF WBC COUNT: CPT

## 2024-08-14 LAB
BASOPHILS # BLD MANUAL: 0 10E3/UL (ref 0–0.2)
BASOPHILS NFR BLD MANUAL: 0 %
EOSINOPHIL # BLD MANUAL: 0 10E3/UL (ref 0–0.7)
EOSINOPHIL NFR BLD MANUAL: 0 %
LYMPHOCYTES # BLD MANUAL: 4.5 10E3/UL (ref 0.8–5.3)
LYMPHOCYTES NFR BLD MANUAL: 35 %
MONOCYTES # BLD MANUAL: 2.5 10E3/UL (ref 0–1.3)
MONOCYTES NFR BLD MANUAL: 19 %
NEUTROPHILS # BLD MANUAL: 5.9 10E3/UL (ref 1.6–8.3)
NEUTROPHILS NFR BLD MANUAL: 46 %
PATH REV: ABNORMAL
PLAT MORPH BLD: ABNORMAL
RBC MORPH BLD: ABNORMAL
TSH SERPL DL<=0.005 MIU/L-ACNC: 1.29 UIU/ML (ref 0.3–4.2)
VARIANT LYMPHS BLD QL SMEAR: PRESENT

## 2024-08-17 ENCOUNTER — HEALTH MAINTENANCE LETTER (OUTPATIENT)
Age: 84
End: 2024-08-17

## 2024-08-22 ENCOUNTER — ONCOLOGY VISIT (OUTPATIENT)
Dept: ONCOLOGY | Facility: HOSPITAL | Age: 84
End: 2024-08-22
Attending: FAMILY MEDICINE
Payer: COMMERCIAL

## 2024-08-22 VITALS
SYSTOLIC BLOOD PRESSURE: 150 MMHG | WEIGHT: 288.7 LBS | RESPIRATION RATE: 18 BRPM | BODY MASS INDEX: 43.75 KG/M2 | DIASTOLIC BLOOD PRESSURE: 67 MMHG | TEMPERATURE: 97.9 F | OXYGEN SATURATION: 94 % | HEART RATE: 82 BPM | HEIGHT: 68 IN

## 2024-08-22 DIAGNOSIS — D72.821 MONOCYTOSIS: ICD-10-CM

## 2024-08-22 PROCEDURE — 99204 OFFICE O/P NEW MOD 45 MIN: CPT | Performed by: INTERNAL MEDICINE

## 2024-08-22 PROCEDURE — G0463 HOSPITAL OUTPT CLINIC VISIT: HCPCS | Performed by: INTERNAL MEDICINE

## 2024-08-22 ASSESSMENT — PAIN SCALES - GENERAL: PAINLEVEL: SEVERE PAIN (6)

## 2024-08-22 NOTE — LETTER
"8/22/2024      Zena Emmanuel  [address]  [place] [address]      Dear Colleague,    Thank you for referring your patient, Zena Emmanuel, to the [facility]. Please see a copy of my visit note below.    Oncology Rooming Note    August 22, 2024 12:57 PM   Zena Emmanuel is a 83 year old female who presents for:    Chief Complaint   Patient presents with     Oncology Clinic Visit     Neoplasm of uncertain behavior of skin     Initial Vitals: BP (!) 150/67   Pulse 82   Temp 97.9  F (36.6  C)   Resp 18   Ht 1.715 m (5' 7.5\")   Wt 131 kg (288 lb 11.2 oz)   SpO2 94%   BMI 44.55 kg/m   Estimated body mass index is 44.55 kg/m  as calculated from the following:    Height as of this encounter: 1.715 m (5' 7.5\").    Weight as of this encounter: 131 kg (288 lb 11.2 oz). Body surface area is 2.5 meters squared.  Severe Pain (6) Comment: Data Unavailable   No LMP recorded. Patient is postmenopausal.  Allergies reviewed: Yes  Medications reviewed: Yes    Medications: Medication refills not needed today.  Pharmacy name entered into [facility]: [facility] #[id number] - [place], [place] - [address]    Frailty Screening:   Is the patient here for a new oncology consult visit in cancer care? 1. Yes. Over the past month, have you experienced difficulty or required a caregiver to assist with:   1. Balance, walking or general mobility (including any falls)? NO  2. Completion of self-care tasks such as bathing, dressing, toileting, grooming/hygiene?  NO  3. Concentration or memory that affects your daily life?  NO       Clinical concerns: New PT      Ingrid Leon LPN               [facility] Hematology and Oncology Consult Note    Patient: Zena Emmanuel  MRN: [id number]  Date of Service: Aug 22, 2024       Reason for Visit    I was consulted by   Sanjana Escobar MD   For evaluation of the patient with persistent elevation of " "monocytes and mild thrombocytopenia.      Encounter Diagnoses Assessment and Plan:    Problem List Items Addressed This Visit       Monocytosis    Relevant Orders    CBC with Platelets & Differential    Flow Cytometry   Patient with persistent monocytosis documented back to 2022 when patient moved to [place] from [place].  Patient notes that her white blood cell count is going up and down for many years.  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  I advised the patient that I would continue to monitor her disease with evaluation about every 3 months.  I advised she should return earlier if she develops symptoms such as weight loss shortness of breath, fatigue or bruising.  CBC with differential and flow cytometry have been ordered.  Patient will have these a few days before her 3 month visit.        ______________________________________________________________________________      ECOG Performance  1 - Can't do physically strenuous work, but fully ambulatory and can do light sedentary work.    History of Present Illness    Ms. Zena Emmanuel is a 83 year old woman who has generally been in good health.  For the last 2 years since moving to [place] she is had an elevated monocyte count with absolute monocytes in the 2000 range forming about 20% of the white cell differential.  More recently she has had mild thrombocytopenia platelet counts in the 140,000 range.  She has not had chills, fevers or sweats.  She has not had unexplained weight loss.  Does have occasional night sweats but these are not soaking.  She does note mild dyspnea on exertion.  She is a former smoker.  She does not use alcohol to excess.    Review of systems.  Pertinent Findings are included in the History of Present Illness    Physical Exam    BP (!) 150/67   Pulse 82   Temp 97.9  F (36.6  C)   Resp 18   Ht 1.715 m (5' 7.5\")   Wt 131 kg (288 lb 11.2 oz)   SpO2 94%   BMI 44.55 " kg/m       GENERAL APPEARANCE: 83-year-old woman in no apparent distress.  HEAD: Atraumatic; normocephalic; without lesions.  EYES: Conjunctiva, corneas and eyelids normal; pupils equal, round, reactive to light; No Icterus.  MOUTH/OROPHARYNX: Oral mucosa intact  NECK: Supple with no nodes.  LUNGS:  Clear to auscultation and percussion with no extra sounds.  HEART: Regular rhythm and rate; S1 and S2 normal; no murmurs noted.  ABDOMEN: Soft; no masses or tenderness, no hepatosplenomegaly.  NEUROLOGIC: Alert and oriented.  No obvious focal findings.  EXTREMITIES: No cyanosis, or edema.  SKIN: No abnormal bruising or bleeding. No suspicious lesions noted on exposed skin.  PSYCHIATRIC: Mental status normal; no apparent psychiatric issues    Medications:    Current Outpatient Medications   Medication Sig Dispense Refill     gabapentin (NEURONTIN) 300 MG capsule Take 2 capsules (600 mg) by mouth every 8 hours as needed for neuropathic pain 180 capsule 2     HYDROcodone-acetaminophen (NORCO) 5-325 MG tablet Take 1 tablet by mouth 2 times daily for 30 days 60 tablet 0     ketoconazole (NIZORAL) 2 % external cream Apply topically daily Apply to ears daily 60 g 1     levothyroxine (SYNTHROID/LEVOTHROID) 75 MCG tablet Take 1 tablet (75 mcg) by mouth daily 90 tablet 1     naloxone (NARCAN) 4 MG/0.1ML nasal spray Spray 1 spray (4 mg) into one nostril alternating nostrils once as needed for opioid reversal every 2-3 minutes until assistance arrives 0.2 mL 0     pravastatin (PRAVACHOL) 80 MG tablet Take 1 tablet (80 mg) by mouth daily 90 tablet 3     tiZANidine (ZANAFLEX) 4 MG tablet Take 1 tablet (4 mg) by mouth daily as needed for muscle spasms 30 tablet 1     triamcinolone (KENALOG) 0.1 % external cream Apply topically 2 times daily 15 g 1     No current facility-administered medications for this visit.           Past History    Past Medical History:   Diagnosis Date     Anemia      Simple chronic bronchitis (H)      Past  "Surgical History:   Procedure Laterality Date     Cancer complete hysterectomy       CATARACT EXTRACTION Bilateral      KNEE SURGERY Left      Allergies   Allergen Reactions     Bees Hives     Penicillins      Other reaction(s): \"Local Swelling\"     Family History   Problem Relation Age of Onset     Heart Disease Father      Heart Disease Maternal Grandmother      Heart Disease Maternal Grandfather      Heart Disease Paternal Grandmother      Heart Disease Paternal Grandfather      Hypertension Sister      Cerebrovascular Disease Sister      Hypertension Sister      Social History     Socioeconomic History     Marital status:      Spouse name: None     Number of children: None     Years of education: None     Highest education level: None   Tobacco Use     Smoking status: Former     Current packs/day: 0.00     Average packs/day: 1 pack/day for 55.0 years (55.0 ttl pk-yrs)     Types: Cigarettes     Start date: 1956     Quit date: 8/3/2010     Years since quittin.0     Passive exposure: Past     Smokeless tobacco: Never   Substance and Sexual Activity     Alcohol use: Not Currently     Drug use: Never     Sexual activity: Not Currently     Social Determinants of Health     Financial Resource Strain: Low Risk  (2023)    Financial Resource Strain      Within the past 12 months, have you or your family members you live with been unable to get utilities (heat, electricity) when it was really needed?: No   Food Insecurity: Low Risk  (2023)    Food Insecurity      Within the past 12 months, did you worry that your food would run out before you got money to buy more?: No      Within the past 12 months, did the food you bought just not last and you didn t have money to get more?: No   Transportation Needs: Unknown (2023)    Transportation Needs      Within the past 12 months, has lack of transportation kept you from medical appointments, getting your medicines, non-medical meetings or " appointments, work, or from getting things that you need?: Patient refused   Interpersonal Safety: Low Risk  (7/2/2024)    Interpersonal Safety      Do you feel physically and emotionally safe where you currently live?: Yes      Within the past 12 months, have you been hit, slapped, kicked or otherwise physically hurt by someone?: No      Within the past 12 months, have you been humiliated or emotionally abused in other ways by your partner or ex-partner?: No   Housing Stability: Low Risk  (9/29/2023)    Housing Stability      Do you have housing? : Yes      Are you worried about losing your housing?: No           Lab Results    Recent Results (from the past 240 hour(s))   TSH    Collection Time: 08/13/24  2:07 PM   Result Value Ref Range    TSH 1.29 0.30 - 4.20 uIU/mL   CBC with platelets and differential    Collection Time: 08/13/24  2:07 PM   Result Value Ref Range    WBC Count 12.9 (H) 4.0 - 11.0 10e3/uL    RBC Count 4.02 3.80 - 5.20 10e6/uL    Hemoglobin 12.8 11.7 - 15.7 g/dL    Hematocrit 39.9 35.0 - 47.0 %    MCV 99 78 - 100 fL    MCH 31.8 26.5 - 33.0 pg    MCHC 32.1 31.5 - 36.5 g/dL    RDW 13.8 10.0 - 15.0 %    Platelet Count 144 (L) 150 - 450 10e3/uL    NRBCs per 100 WBC 0 <1 /100    Absolute NRBCs 0.0 10e3/uL   Manual Differential    Collection Time: 08/13/24  2:07 PM   Result Value Ref Range    % Neutrophils 46 %    % Lymphocytes 35 %    % Monocytes 19 %    % Eosinophils 0 %    % Basophils 0 %    Absolute Neutrophils 5.9 1.6 - 8.3 10e3/uL    Absolute Lymphocytes 4.5 0.8 - 5.3 10e3/uL    Absolute Monocytes 2.5 (H) 0.0 - 1.3 10e3/uL    Absolute Eosinophils 0.0 0.0 - 0.7 10e3/uL    Absolute Basophils 0.0 0.0 - 0.2 10e3/uL    RBC Morphology Confirmed RBC Indices     Platelet Assessment  Automated Count Confirmed. Platelet morphology is normal.     Automated Count Confirmed. Platelet morphology is normal.    Reactive Lymphocytes Present (A) None Seen    Pathologist Review Comments (Blood) Slide reviewed by   Thomas Jacobs            I spent 45 minutes on the patient's visit today.  This included preparation for the visit, face-to-face time with the patient and documentation following the visit.  It did not include teaching or procedure time.    Signed by: Peter E. Friedell, MD          Again, thank you for allowing me to participate in the care of your patient.        Sincerely,        Peter E. Friedell, MD

## 2024-08-22 NOTE — PROGRESS NOTES
"Oncology Rooming Note    August 22, 2024 12:57 PM   Zena Emmanuel is a 83 year old female who presents for:    Chief Complaint   Patient presents with    Oncology Clinic Visit     Neoplasm of uncertain behavior of skin     Initial Vitals: BP (!) 150/67   Pulse 82   Temp 97.9  F (36.6  C)   Resp 18   Ht 1.715 m (5' 7.5\")   Wt 131 kg (288 lb 11.2 oz)   SpO2 94%   BMI 44.55 kg/m   Estimated body mass index is 44.55 kg/m  as calculated from the following:    Height as of this encounter: 1.715 m (5' 7.5\").    Weight as of this encounter: 131 kg (288 lb 11.2 oz). Body surface area is 2.5 meters squared.  Severe Pain (6) Comment: Data Unavailable   No LMP recorded. Patient is postmenopausal.  Allergies reviewed: Yes  Medications reviewed: Yes    Medications: Medication refills not needed today.  Pharmacy name entered into [employer]: [facility] #[id number] - [place], [place] - [address] [place] [address]    Frailty Screening:   Is the patient here for a new oncology consult visit in cancer care? 1. Yes. Over the past month, have you experienced difficulty or required a caregiver to assist with:   1. Balance, walking or general mobility (including any falls)? NO  2. Completion of self-care tasks such as bathing, dressing, toileting, grooming/hygiene?  NO  3. Concentration or memory that affects your daily life?  NO       Clinical concerns: New PT      Ingrid Leon LPN             "

## 2024-08-22 NOTE — PROGRESS NOTES
[facility] Hematology and Oncology Consult Note    Patient: Zena Emmanuel  MRN: [id number]  Date of Service: Aug 22, 2024       Reason for Visit    I was consulted by   Sanjana Escobar MD   For evaluation of the patient with persistent elevation of monocytes and mild thrombocytopenia.      Encounter Diagnoses Assessment and Plan:    Problem List Items Addressed This Visit       Monocytosis    Relevant Orders    CBC with Platelets & Differential    Flow Cytometry   Patient with persistent monocytosis documented back to 2022 when patient moved to [place] from [place].  Patient notes that her white blood cell count is going up and down for many years.  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  I advised the patient that I would continue to monitor her disease with evaluation about every 3 months.  I advised she should return earlier if she develops symptoms such as weight loss shortness of breath, fatigue or bruising.  CBC with differential and flow cytometry have been ordered.  Patient will have these a few days before her 3 month visit.        ______________________________________________________________________________      ECOG Performance  1 - Can't do physically strenuous work, but fully ambulatory and can do light sedentary work.    History of Present Illness    Ms. Zena Emmanuel is a 83 year old woman who has generally been in good health.  For the last 2 years since moving to [place] she is had an elevated monocyte count with absolute monocytes in the 2000 range forming about 20% of the white cell differential.  More recently she has had mild thrombocytopenia platelet counts in the 140,000 range.  She has not had chills, fevers or sweats.  She has not had unexplained weight loss.  Does have occasional night sweats but these are not soaking.  She does note mild dyspnea on exertion.  She is a former smoker.  She does not use alcohol  "to excess.    Review of systems.  Pertinent Findings are included in the History of Present Illness    Physical Exam    BP (!) 150/67   Pulse 82   Temp 97.9  F (36.6  C)   Resp 18   Ht 1.715 m (5' 7.5\")   Wt 131 kg (288 lb 11.2 oz)   SpO2 94%   BMI 44.55 kg/m       GENERAL APPEARANCE: 83-year-old woman in no apparent distress.  HEAD: Atraumatic; normocephalic; without lesions.  EYES: Conjunctiva, corneas and eyelids normal; pupils equal, round, reactive to light; No Icterus.  MOUTH/OROPHARYNX: Oral mucosa intact  NECK: Supple with no nodes.  LUNGS:  Clear to auscultation and percussion with no extra sounds.  HEART: Regular rhythm and rate; S1 and S2 normal; no murmurs noted.  ABDOMEN: Soft; no masses or tenderness, no hepatosplenomegaly.  NEUROLOGIC: Alert and oriented.  No obvious focal findings.  EXTREMITIES: No cyanosis, or edema.  SKIN: No abnormal bruising or bleeding. No suspicious lesions noted on exposed skin.  PSYCHIATRIC: Mental status normal; no apparent psychiatric issues    Medications:    Current Outpatient Medications   Medication Sig Dispense Refill    gabapentin (NEURONTIN) 300 MG capsule Take 2 capsules (600 mg) by mouth every 8 hours as needed for neuropathic pain 180 capsule 2    HYDROcodone-acetaminophen (NORCO) 5-325 MG tablet Take 1 tablet by mouth 2 times daily for 30 days 60 tablet 0    ketoconazole (NIZORAL) 2 % external cream Apply topically daily Apply to ears daily 60 g 1    levothyroxine (SYNTHROID/LEVOTHROID) 75 MCG tablet Take 1 tablet (75 mcg) by mouth daily 90 tablet 1    naloxone (NARCAN) 4 MG/0.1ML nasal spray Spray 1 spray (4 mg) into one nostril alternating nostrils once as needed for opioid reversal every 2-3 minutes until assistance arrives 0.2 mL 0    pravastatin (PRAVACHOL) 80 MG tablet Take 1 tablet (80 mg) by mouth daily 90 tablet 3    tiZANidine (ZANAFLEX) 4 MG tablet Take 1 tablet (4 mg) by mouth daily as needed for muscle spasms 30 tablet 1    triamcinolone " "(KENALOG) 0.1 % external cream Apply topically 2 times daily 15 g 1     No current facility-administered medications for this visit.           Past History    Past Medical History:   Diagnosis Date    Anemia     Simple chronic bronchitis (H)      Past Surgical History:   Procedure Laterality Date    Cancer complete hysterectomy      CATARACT EXTRACTION Bilateral     KNEE SURGERY Left      Allergies   Allergen Reactions    Bees Hives    Penicillins      Other reaction(s): \"Local Swelling\"     Family History   Problem Relation Age of Onset    Heart Disease Father     Heart Disease Maternal Grandmother     Heart Disease Maternal Grandfather     Heart Disease Paternal Grandmother     Heart Disease Paternal Grandfather     Hypertension Sister     Cerebrovascular Disease Sister     Hypertension Sister      Social History     Socioeconomic History    Marital status:      Spouse name: None    Number of children: None    Years of education: None    Highest education level: None   Tobacco Use    Smoking status: Former     Current packs/day: 0.00     Average packs/day: 1 pack/day for 55.0 years (55.0 ttl pk-yrs)     Types: Cigarettes     Start date: 1956     Quit date: 8/3/2010     Years since quittin.0     Passive exposure: Past    Smokeless tobacco: Never   Substance and Sexual Activity    Alcohol use: Not Currently    Drug use: Never    Sexual activity: Not Currently     Social Determinants of Health     Financial Resource Strain: Low Risk  (2023)    Financial Resource Strain     Within the past 12 months, have you or your family members you live with been unable to get utilities (heat, electricity) when it was really needed?: No   Food Insecurity: Low Risk  (2023)    Food Insecurity     Within the past 12 months, did you worry that your food would run out before you got money to buy more?: No     Within the past 12 months, did the food you bought just not last and you didn t have money to get " more?: No   Transportation Needs: Unknown (9/29/2023)    Transportation Needs     Within the past 12 months, has lack of transportation kept you from medical appointments, getting your medicines, non-medical meetings or appointments, work, or from getting things that you need?: Patient refused   Interpersonal Safety: Low Risk  (7/2/2024)    Interpersonal Safety     Do you feel physically and emotionally safe where you currently live?: Yes     Within the past 12 months, have you been hit, slapped, kicked or otherwise physically hurt by someone?: No     Within the past 12 months, have you been humiliated or emotionally abused in other ways by your partner or ex-partner?: No   Housing Stability: Low Risk  (9/29/2023)    Housing Stability     Do you have housing? : Yes     Are you worried about losing your housing?: No           Lab Results    Recent Results (from the past 240 hour(s))   TSH    Collection Time: 08/13/24  2:07 PM   Result Value Ref Range    TSH 1.29 0.30 - 4.20 uIU/mL   CBC with platelets and differential    Collection Time: 08/13/24  2:07 PM   Result Value Ref Range    WBC Count 12.9 (H) 4.0 - 11.0 10e3/uL    RBC Count 4.02 3.80 - 5.20 10e6/uL    Hemoglobin 12.8 11.7 - 15.7 g/dL    Hematocrit 39.9 35.0 - 47.0 %    MCV 99 78 - 100 fL    MCH 31.8 26.5 - 33.0 pg    MCHC 32.1 31.5 - 36.5 g/dL    RDW 13.8 10.0 - 15.0 %    Platelet Count 144 (L) 150 - 450 10e3/uL    NRBCs per 100 WBC 0 <1 /100    Absolute NRBCs 0.0 10e3/uL   Manual Differential    Collection Time: 08/13/24  2:07 PM   Result Value Ref Range    % Neutrophils 46 %    % Lymphocytes 35 %    % Monocytes 19 %    % Eosinophils 0 %    % Basophils 0 %    Absolute Neutrophils 5.9 1.6 - 8.3 10e3/uL    Absolute Lymphocytes 4.5 0.8 - 5.3 10e3/uL    Absolute Monocytes 2.5 (H) 0.0 - 1.3 10e3/uL    Absolute Eosinophils 0.0 0.0 - 0.7 10e3/uL    Absolute Basophils 0.0 0.0 - 0.2 10e3/uL    RBC Morphology Confirmed RBC Indices     Platelet Assessment  Automated  Count Confirmed. Platelet morphology is normal.     Automated Count Confirmed. Platelet morphology is normal.    Reactive Lymphocytes Present (A) None Seen    Pathologist Review Comments (Blood) Slide reviewed by Dr Thomas Jacobs            I spent 45 minutes on the patient's visit today.  This included preparation for the visit, face-to-face time with the patient and documentation following the visit.  It did not include teaching or procedure time.    Signed by: Peter E. Friedell, MD

## 2024-08-26 ENCOUNTER — TELEPHONE (OUTPATIENT)
Dept: FAMILY MEDICINE | Facility: CLINIC | Age: 84
End: 2024-08-26
Payer: COMMERCIAL

## 2024-08-27 ENCOUNTER — PATIENT OUTREACH (OUTPATIENT)
Dept: ONCOLOGY | Facility: HOSPITAL | Age: 84
End: 2024-08-27
Payer: COMMERCIAL

## 2024-08-27 NOTE — PROGRESS NOTES
[facility]: Cancer Care                                                                                          Situation: Chart reviewed by RN Care Coordinator.    Background: Patient was seen in clinic last week for consultation regarding monocytosis and mild thrombocytopenia.    Assessment: Persistent monocytosis documented back to 2022.    Plan/Recommendations: Patient is to return in 3 months with labs.      Signature:  Faviola Greer  RN Care Coordinator  [facility]

## 2024-09-03 ENCOUNTER — OFFICE VISIT (OUTPATIENT)
Dept: FAMILY MEDICINE | Facility: CLINIC | Age: 84
End: 2024-09-03
Payer: COMMERCIAL

## 2024-09-03 VITALS
OXYGEN SATURATION: 93 % | TEMPERATURE: 98.3 F | DIASTOLIC BLOOD PRESSURE: 82 MMHG | WEIGHT: 290 LBS | HEART RATE: 73 BPM | SYSTOLIC BLOOD PRESSURE: 151 MMHG | BODY MASS INDEX: 45.52 KG/M2 | HEIGHT: 67 IN | RESPIRATION RATE: 20 BRPM

## 2024-09-03 DIAGNOSIS — L30.9 DERMATITIS OF EXTERNAL EAR: Primary | ICD-10-CM

## 2024-09-03 DIAGNOSIS — D72.821 MONOCYTOSIS: ICD-10-CM

## 2024-09-03 DIAGNOSIS — M17.12 PRIMARY LOCALIZED OSTEOARTHROSIS OF LEFT LOWER LEG: ICD-10-CM

## 2024-09-03 DIAGNOSIS — R03.0 ELEVATED BLOOD PRESSURE READING WITHOUT DIAGNOSIS OF HYPERTENSION: ICD-10-CM

## 2024-09-03 DIAGNOSIS — E66.01 MORBID OBESITY (H): ICD-10-CM

## 2024-09-03 PROCEDURE — 99214 OFFICE O/P EST MOD 30 MIN: CPT | Performed by: FAMILY MEDICINE

## 2024-09-03 PROCEDURE — G2211 COMPLEX E/M VISIT ADD ON: HCPCS | Performed by: FAMILY MEDICINE

## 2024-09-03 RX ORDER — NEOMYCIN SULFATE, POLYMYXIN B SULFATE, HYDROCORTISONE 3.5; 10000; 1 MG/ML; [USP'U]/ML; MG/ML
3 SOLUTION/ DROPS AURICULAR (OTIC) 4 TIMES DAILY
Qty: 10 ML | Refills: 1 | Status: SHIPPED | OUTPATIENT
Start: 2024-09-03 | End: 2024-09-17

## 2024-09-03 RX ORDER — TRIAMCINOLONE ACETONIDE 1 MG/G
CREAM TOPICAL 2 TIMES DAILY
Qty: 15 G | Refills: 1 | Status: SHIPPED | OUTPATIENT
Start: 2024-09-03

## 2024-09-03 ASSESSMENT — ANXIETY QUESTIONNAIRES
GAD7 TOTAL SCORE: 0
8. IF YOU CHECKED OFF ANY PROBLEMS, HOW DIFFICULT HAVE THESE MADE IT FOR YOU TO DO YOUR WORK, TAKE CARE OF THINGS AT HOME, OR GET ALONG WITH OTHER PEOPLE?: NOT DIFFICULT AT ALL
GAD7 TOTAL SCORE: 0
GAD7 TOTAL SCORE: 0
7. FEELING AFRAID AS IF SOMETHING AWFUL MIGHT HAPPEN: NOT AT ALL

## 2024-09-03 ASSESSMENT — PATIENT HEALTH QUESTIONNAIRE - PHQ9
SUM OF ALL RESPONSES TO PHQ QUESTIONS 1-9: 1
10. IF YOU CHECKED OFF ANY PROBLEMS, HOW DIFFICULT HAVE THESE PROBLEMS MADE IT FOR YOU TO DO YOUR WORK, TAKE CARE OF THINGS AT HOME, OR GET ALONG WITH OTHER PEOPLE: NOT DIFFICULT AT ALL
SUM OF ALL RESPONSES TO PHQ QUESTIONS 1-9: 1

## 2024-09-03 NOTE — PROGRESS NOTES
Assessment & Plan     Dermatitis of external ear  Recurrence of left ear dermatitis.  Meds as below.  Also apply ketoconazole (has tube at home).  - triamcinolone (KENALOG) 0.1 % external cream; Apply topically 2 times daily.  - neomycin-polymyxin-hydrocortisone (CORTISPORIN) 3.5-52919-9 otic solution; Place 3 drops into the right ear 4 times daily.    Primary localized osteoarthrosis of left lower leg  Return for left knee injection    Monocytosis  Seen/evaluated by oncology.   Diagnosed with likely chronic myelomonocytic leukemia.  Will monitor every 3 months.  Evaluate sooner if developing wegiht loss, SOB, fatigue or bruising.   Has appointment in November.    Elevated blood pressure reading without diagnosis of hypertension  Monitor closely. Reports normal BP at home.     HCM  Pt brings records from [place] with following info:  Prior dexa done in 2017 - osteopenia  Zoster x 2, 2019  PPV 5/8/2018, PCV 13 9/16/2016  Tdap 9/20/2017      FUTURE APPOINTMENTS:       - Follow-up visit in 1-2 weeks for knee injection    Sanjana Escobar MD    The longitudinal plan of care for the diagnosis(es)/condition(s) as documented were addressed during this visit. Due to the added complexity in care, I will continue to support Zena in the subsequent management and with ongoing continuity of care.    =========================        Subjective   Zena is a 83 year old, presenting for the following health issues:  RECHECK ([facility] )        9/3/2024     3:48 PM   Additional Questions   Roomed by Kalia   Accompanied by self         9/3/2024    Information    services provided? No      Seen by Dr. Friedell in oncology on 8/22/24.  Diagnosed with likely chronic myelomonocytic leukemia.  Will monitor every 3 months.  Evaluate sooner if developing wegiht loss, SOB, fatigue or bruising.   Has appointment in November.  Not feeling nervous.      Home BP have been 125-130/70-80s.  Says BP is usually elevated in  "clinic.     Left Knee pain - wondering about getting another injection  Per chart,  May 2023 steroid injection  Sept 2023 steroid injection - Improved pain for 3 months.   Xrays done in [place] and had medial joint degeneration on left.         Objective    BP (!) 151/82 (BP Location: Right arm, Patient Position: Sitting)   Pulse 73   Temp 98.3  F (36.8  C) (Oral)   Resp 20   Ht 1.71 m (5' 7.32\")   Wt 131.5 kg (290 lb)   SpO2 93%   BMI 44.99 kg/m    Body mass index is 44.99 kg/m .  Physical Exam   GENERAL: alert and no distress  HENT: right ear: external ear and lateral cheek with recurrence of scaly, irregular rash  MS: extremities normal- no gross deformities noted, no redness or tenderness of left knee          Signed Electronically by: Sanjana Escobar MD    Answers submitted by the patient for this visit:  Patient Health Questionnaire (Submitted on 9/3/2024)  If you checked off any problems, how difficult have these problems made it for you to do your work, take care of things at home, or get along with other people?: Not difficult at all  PHQ9 TOTAL SCORE: 1  Patient Health Questionnaire (G7) (Submitted on 9/3/2024)  EMIR 7 TOTAL SCORE: 0    "

## 2024-09-17 ENCOUNTER — MYC REFILL (OUTPATIENT)
Dept: FAMILY MEDICINE | Facility: CLINIC | Age: 84
End: 2024-09-17

## 2024-09-17 ENCOUNTER — OFFICE VISIT (OUTPATIENT)
Dept: FAMILY MEDICINE | Facility: CLINIC | Age: 84
End: 2024-09-17
Payer: COMMERCIAL

## 2024-09-17 VITALS
HEIGHT: 67 IN | DIASTOLIC BLOOD PRESSURE: 79 MMHG | RESPIRATION RATE: 14 BRPM | OXYGEN SATURATION: 95 % | WEIGHT: 288 LBS | SYSTOLIC BLOOD PRESSURE: 136 MMHG | HEART RATE: 76 BPM | BODY MASS INDEX: 45.2 KG/M2

## 2024-09-17 DIAGNOSIS — G89.4 CHRONIC PAIN SYNDROME: ICD-10-CM

## 2024-09-17 DIAGNOSIS — G89.29 CHRONIC MIDLINE LOW BACK PAIN WITH LEFT-SIDED SCIATICA: ICD-10-CM

## 2024-09-17 DIAGNOSIS — E03.9 HYPOTHYROIDISM, UNSPECIFIED TYPE: ICD-10-CM

## 2024-09-17 DIAGNOSIS — M17.12 PRIMARY LOCALIZED OSTEOARTHROSIS OF LEFT LOWER LEG: Primary | ICD-10-CM

## 2024-09-17 DIAGNOSIS — L30.9 DERMATITIS OF EXTERNAL EAR: ICD-10-CM

## 2024-09-17 DIAGNOSIS — M54.42 CHRONIC MIDLINE LOW BACK PAIN WITH LEFT-SIDED SCIATICA: ICD-10-CM

## 2024-09-17 PROCEDURE — 99214 OFFICE O/P EST MOD 30 MIN: CPT | Mod: 25 | Performed by: FAMILY MEDICINE

## 2024-09-17 PROCEDURE — 20610 DRAIN/INJ JOINT/BURSA W/O US: CPT | Performed by: FAMILY MEDICINE

## 2024-09-17 RX ORDER — NEOMYCIN SULFATE, POLYMYXIN B SULFATE, HYDROCORTISONE 3.5; 10000; 1 MG/ML; [USP'U]/ML; MG/ML
3 SOLUTION/ DROPS AURICULAR (OTIC) 4 TIMES DAILY
Qty: 10 ML | Refills: 1 | Status: SHIPPED | OUTPATIENT
Start: 2024-09-17

## 2024-09-17 RX ORDER — GABAPENTIN 300 MG/1
600 CAPSULE ORAL EVERY 8 HOURS PRN
Qty: 180 CAPSULE | Refills: 1 | Status: SHIPPED | OUTPATIENT
Start: 2024-09-17

## 2024-09-17 RX ORDER — TRIAMCINOLONE ACETONIDE 40 MG/ML
40 INJECTION, SUSPENSION INTRA-ARTICULAR; INTRAMUSCULAR ONCE
Status: COMPLETED | OUTPATIENT
Start: 2024-09-17 | End: 2024-09-17

## 2024-09-17 RX ORDER — LEVOTHYROXINE SODIUM 75 UG/1
75 TABLET ORAL DAILY
Qty: 90 TABLET | Refills: 1 | Status: SHIPPED | OUTPATIENT
Start: 2024-09-17

## 2024-09-17 RX ADMIN — TRIAMCINOLONE ACETONIDE 40 MG: 40 INJECTION, SUSPENSION INTRA-ARTICULAR; INTRAMUSCULAR at 16:57

## 2024-09-17 NOTE — TELEPHONE ENCOUNTER
Outside RN standing orders due to drug class for two out of the three medications requested. Routing to PCP to review and fill. Samantha ABRAHAM

## 2024-09-17 NOTE — PROGRESS NOTES
"  Assessment & Plan     Primary localized osteoarthrosis of left lower leg  Steroid injection today.  Return precautions discussed.  Can repeat in 4 months if needed.  - triamcinolone (KENALOG-40) injection 40 mg  - DRAIN/INJECT LARGE JOINT/BURSA    Chronic pain syndrome  - gabapentin (NEURONTIN) 300 MG capsule; Take 2 capsules (600 mg) by mouth every 8 hours as needed for neuropathic pain.    Hypothyroidism, unspecified type  Last TSH on 8/13/24 was therapeutic range.  Refilled levothyroxine  - levothyroxine (SYNTHROID/LEVOTHROID) 75 MCG tablet; Take 1 tablet (75 mcg) by mouth daily.    Dermatitis of external ear  Improving, refilled cortisporin.  Return if does not resolve in 2 weeks.  - neomycin-polymyxin-hydrocortisone (CORTISPORIN) 3.5-77792-9 otic solution; Place 3 drops into the right ear 4 times daily.    Sanjana Escobar MD      Adolfo Freitas is a 83 year old, presenting for the following health issues:  Imm/Inj (Lft knee ) and Refill Request      9/17/2024     3:55 PM   Additional Questions   Roomed by Masha bassett         9/17/2024    Information    services provided? No        Here today for her left knee pain.  Has had chronic knee pain for several years due to moderate-severe osteoarthritis. Using a cane for ambulation.  Last injection was in one year ago, Sept 2023.  Was helpful for about 3 months.  Does not want knee surgery.      Also requesting refills of medications         Objective    /79   Pulse 76   Resp 14   Ht 1.702 m (5' 7\")   Wt 130.6 kg (288 lb)   SpO2 95%   BMI 45.11 kg/m    Body mass index is 45.11 kg/m .  Physical Exam   GENERAL: alert and no distress  MS: right knee without gross deformity, no overlying skin rash.  No effusion.     Reviewed outside imaging of left knee from 2/2/2022 in [place].  Left knee AP imaging with moderate-severe medial joint space narrowing.          Knee Injection Note    Consent: Affirmation of informed consent " was signed and scanned into the medical record. Risks, benefits and alternatives were discussed. Patient's questions were elicited and answered.   Procedure safety checklist was completed:  Yes  Time Out (Pause for the Cause) completed: Yes    The left knee was prepped  in the usual fashion.    INJECTION:    Using 4 cc of 1% lidocaine mixed with 40 mg of kenalog, the knee joint was successfully injected  without complication. Bandaid applied.  Routine care discussed.    Was entire vial of medication used? Yes    Follow up: Patient was instructed to call if severe pain, redness, warmth or other concerns      Signed Electronically by: Sanjana Escobar MD

## 2024-09-18 RX ORDER — GABAPENTIN 300 MG/1
600 CAPSULE ORAL EVERY 8 HOURS PRN
Qty: 180 CAPSULE | Refills: 2 | Status: SHIPPED | OUTPATIENT
Start: 2024-09-18

## 2024-09-18 RX ORDER — LEVOTHYROXINE SODIUM 75 UG/1
75 TABLET ORAL DAILY
Qty: 90 TABLET | Refills: 1 | Status: SHIPPED | OUTPATIENT
Start: 2024-09-18

## 2024-09-18 RX ORDER — HYDROCODONE BITARTRATE AND ACETAMINOPHEN 5; 325 MG/1; MG/1
1 TABLET ORAL 2 TIMES DAILY
Qty: 60 TABLET | Refills: 0 | Status: SHIPPED | OUTPATIENT
Start: 2024-09-18

## 2024-10-18 ENCOUNTER — MYC REFILL (OUTPATIENT)
Dept: FAMILY MEDICINE | Facility: CLINIC | Age: 84
End: 2024-10-18
Payer: COMMERCIAL

## 2024-10-18 DIAGNOSIS — G89.29 CHRONIC MIDLINE LOW BACK PAIN WITH LEFT-SIDED SCIATICA: ICD-10-CM

## 2024-10-18 DIAGNOSIS — M54.42 CHRONIC MIDLINE LOW BACK PAIN WITH LEFT-SIDED SCIATICA: ICD-10-CM

## 2024-10-18 RX ORDER — HYDROCODONE BITARTRATE AND ACETAMINOPHEN 5; 325 MG/1; MG/1
1 TABLET ORAL 2 TIMES DAILY
Qty: 60 TABLET | Refills: 0 | Status: SHIPPED | OUTPATIENT
Start: 2024-10-18

## 2024-10-21 ENCOUNTER — MYC REFILL (OUTPATIENT)
Dept: FAMILY MEDICINE | Facility: CLINIC | Age: 84
End: 2024-10-21
Payer: COMMERCIAL

## 2024-10-21 DIAGNOSIS — G89.29 CHRONIC MIDLINE LOW BACK PAIN WITH LEFT-SIDED SCIATICA: ICD-10-CM

## 2024-10-21 DIAGNOSIS — M54.42 CHRONIC MIDLINE LOW BACK PAIN WITH LEFT-SIDED SCIATICA: ICD-10-CM

## 2024-10-21 RX ORDER — HYDROCODONE BITARTRATE AND ACETAMINOPHEN 5; 325 MG/1; MG/1
1 TABLET ORAL 2 TIMES DAILY
Qty: 60 TABLET | Refills: 0 | Status: CANCELLED | OUTPATIENT
Start: 2024-10-21

## 2024-10-22 NOTE — TELEPHONE ENCOUNTER
Reviewed PDMP  Last rx for Hydrocodone-acetaminophen 5-325mg was written and filled on 9/18/24.  No red flags or concerns on PDMP.  Recently sent new prescription on 10/18/24.  Message to patient to call pharmacy to ensure prescription was received and can be filled.    Sanjana Escobar MD

## 2024-11-12 NOTE — TELEPHONE ENCOUNTER
Patient overdue for utox which is required due to regular use of hydrocodone.    Ordered utox and message sent to patient as FYI.  Sanjana Escobar MD

## 2024-11-14 ENCOUNTER — LAB (OUTPATIENT)
Dept: LAB | Facility: CLINIC | Age: 84
End: 2024-11-14
Payer: COMMERCIAL

## 2024-11-14 DIAGNOSIS — D72.821 MONOCYTOSIS: ICD-10-CM

## 2024-11-14 LAB
BASOPHILS # BLD AUTO: 0 10E3/UL (ref 0–0.2)
BASOPHILS NFR BLD AUTO: 0 %
EOSINOPHIL # BLD AUTO: 0 10E3/UL (ref 0–0.7)
EOSINOPHIL NFR BLD AUTO: 0 %
ERYTHROCYTE [DISTWIDTH] IN BLOOD BY AUTOMATED COUNT: 14.1 % (ref 10–15)
HCT VFR BLD AUTO: 41.1 % (ref 35–47)
HGB BLD-MCNC: 12.7 G/DL (ref 11.7–15.7)
IMM GRANULOCYTES # BLD: 0.1 10E3/UL
IMM GRANULOCYTES NFR BLD: 1 %
LYMPHOCYTES # BLD AUTO: 4.2 10E3/UL (ref 0.8–5.3)
LYMPHOCYTES NFR BLD AUTO: 36 %
MCH RBC QN AUTO: 31.7 PG (ref 26.5–33)
MCHC RBC AUTO-ENTMCNC: 30.9 G/DL (ref 31.5–36.5)
MCV RBC AUTO: 103 FL (ref 78–100)
MONOCYTES # BLD AUTO: 2.3 10E3/UL (ref 0–1.3)
MONOCYTES NFR BLD AUTO: 19 %
NEUTROPHILS # BLD AUTO: 5.2 10E3/UL (ref 1.6–8.3)
NEUTROPHILS NFR BLD AUTO: 44 %
PLATELET # BLD AUTO: 114 10E3/UL (ref 150–450)
RBC # BLD AUTO: 4.01 10E6/UL (ref 3.8–5.2)
WBC # BLD AUTO: 11.7 10E3/UL (ref 4–11)

## 2024-11-15 LAB
PATH REPORT.COMMENTS IMP SPEC: ABNORMAL
PATH REPORT.COMMENTS IMP SPEC: YES
PATH REPORT.FINAL DX SPEC: ABNORMAL
PATH REPORT.MICROSCOPIC SPEC OTHER STN: ABNORMAL
PATH REPORT.RELEVANT HX SPEC: ABNORMAL

## 2024-11-18 ENCOUNTER — MYC REFILL (OUTPATIENT)
Dept: FAMILY MEDICINE | Facility: CLINIC | Age: 84
End: 2024-11-18
Payer: COMMERCIAL

## 2024-11-18 DIAGNOSIS — M54.42 CHRONIC MIDLINE LOW BACK PAIN WITH LEFT-SIDED SCIATICA: ICD-10-CM

## 2024-11-18 DIAGNOSIS — G89.29 CHRONIC MIDLINE LOW BACK PAIN WITH LEFT-SIDED SCIATICA: ICD-10-CM

## 2024-11-19 RX ORDER — HYDROCODONE BITARTRATE AND ACETAMINOPHEN 5; 325 MG/1; MG/1
1 TABLET ORAL 2 TIMES DAILY
Qty: 60 TABLET | Refills: 0 | Status: SHIPPED | OUTPATIENT
Start: 2024-11-21 | End: 2024-12-21

## 2024-11-19 NOTE — TELEPHONE ENCOUNTER
Reviewed request for norco.  Patient on norco for several years for chronic back pain, see prior notes.  PDMP reviewed and no concerns seen.  Patient due for Utox, left mychart message.  Will plan to see patient in February and check at that time.     Sanjana Escobar MD

## 2024-11-21 ENCOUNTER — ONCOLOGY VISIT (OUTPATIENT)
Dept: ONCOLOGY | Facility: HOSPITAL | Age: 84
End: 2024-11-21
Payer: COMMERCIAL

## 2024-11-21 ENCOUNTER — PATIENT OUTREACH (OUTPATIENT)
Dept: ONCOLOGY | Facility: HOSPITAL | Age: 84
End: 2024-11-21

## 2024-11-21 VITALS
WEIGHT: 293 LBS | RESPIRATION RATE: 16 BRPM | TEMPERATURE: 98.7 F | HEART RATE: 81 BPM | DIASTOLIC BLOOD PRESSURE: 68 MMHG | BODY MASS INDEX: 46.45 KG/M2 | OXYGEN SATURATION: 92 % | SYSTOLIC BLOOD PRESSURE: 132 MMHG

## 2024-11-21 DIAGNOSIS — D72.821 MONOCYTOSIS: Primary | ICD-10-CM

## 2024-11-21 PROCEDURE — G0463 HOSPITAL OUTPT CLINIC VISIT: HCPCS | Performed by: INTERNAL MEDICINE

## 2024-11-21 ASSESSMENT — PAIN SCALES - GENERAL: PAINLEVEL_OUTOF10: NO PAIN (0)

## 2024-11-21 NOTE — LETTER
"11/21/2024      Zena Emmanuel  [address]  [place] [address]      Dear Colleague,    Thank you for referring your patient, Zena Emmanuel, to the [facility] [place]. Please see a copy of my visit note below.    Oncology Rooming Note    November 21, 2024 11:06 AM   Zena Emmanuel is a 83 year old female who presents for:    Chief Complaint   Patient presents with     Oncology Clinic Visit     Return visit 3 months with lab. Monocytosis.     Initial Vitals: Pulse 81   Temp 98.7  F (37.1  C) (Oral)   Resp 16   Wt 134.5 kg (296 lb 9.6 oz)   SpO2 92%   BMI 46.45 kg/m   Estimated body mass index is 46.45 kg/m  as calculated from the following:    Height as of 9/17/24: 1.702 m (5' 7\").    Weight as of this encounter: 134.5 kg (296 lb 9.6 oz). Body surface area is 2.52 meters squared.  No Pain (0) Comment: Data Unavailable   No LMP recorded. Patient has had a hysterectomy.  Allergies reviewed: Yes  Medications reviewed: Yes    Medications: Medication refills not needed today.  Pharmacy name entered into [facility]: [facility] #[id number] - [place], [place] - [address] [place] [address] AT [facility] OF [facility]    Frailty Screening:   Is the patient here for a new oncology consult visit in cancer care? 2. No      Clinical concerns: none       Brittanie Solano [facility] Progress Note    Patient: Zena Emmanuel  MRN: [id number]  Date of Service: Nov 21, 2024       Reason for Visit    I was consulted by   Sanjana Escobar MD   Follow up for  persistent elevation of monocytes and mild thrombocytopenia.      Encounter Diagnoses Assessment and Plan:    Problem List Items Addressed This Visit       Monocytosis - Primary    Relevant Orders    CBC with Platelets & Differential    Comprehensive metabolic panel    Flow Cytometry   Patient with persistent monocytosis documented back to 2022 when patient moved to [place] from [place].  " Patient notes that her white blood cell count is going up and down for many years.  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      Update 11/21/2024.  Patient returns for follow-up.  She remains asymptomatic.  Flow cytometry in addition to monocytosis consistent with CMML also shows a clone of monoclonal lymphocytes compatible with CLL/SLL.      I advised the patient that I would continue to monitor her disease with evaluation about every 3 months.  I advised she should return earlier if she develops symptoms such as weight loss shortness of breath, fatigue or bruising.  CBC with differential and flow cytometry have been ordered.  Patient will have these a few days before her 3 month visit.            ______________________________________________________________________________      ECOG Performance  1 - Can't do physically strenuous work, but fully ambulatory and can do light sedentary work.    History of Present Illness    Ms. Zena Emmanuel is an 83 year old woman who has generally been in good health.  For the last 2 years since moving to [place] she is had an elevated monocyte count with absolute monocytes in the 2000 range forming about 20% of the white cell differential.  More recently she has had mild thrombocytopenia platelet counts in the 140,000 range.  She has not had chills, fevers or sweats.  She has not had unexplained weight loss.  Does have occasional night sweats but these are not soaking.  She does note mild dyspnea on exertion.  She is a former smoker.  She does not use alcohol to excess.    Review of systems.  Pertinent Findings are included in the History of Present Illness    Physical Exam    /68 (BP Location: Left arm, Patient Position: Sitting, Cuff Size: Adult Large)   Pulse 81   Temp 98.7  F (37.1  C) (Oral)   Resp 16   Wt 134.5 kg (296 lb 9.6 oz)   SpO2 92%   BMI 46.45 kg/m       GENERAL APPEARANCE: 83-year-old woman in  no apparent distress.  HEAD: Atraumatic; normocephalic; without lesions.  EYES: Conjunctiva, corneas and eyelids normal; pupils equal, round, reactive to light; No Icterus.  MOUTH/OROPHARYNX: Oral mucosa intact  NECK: Supple with no nodes.  LUNGS:  Clear to auscultation and percussion with no extra sounds.  HEART: Regular rhythm and rate; S1 and S2 normal; no murmurs noted.  ABDOMEN: Soft; no masses or tenderness, no hepatosplenomegaly.  NEUROLOGIC: Alert and oriented.  No obvious focal findings.  EXTREMITIES: No cyanosis, or edema.  SKIN: No abnormal bruising or bleeding. No suspicious lesions noted on exposed skin.  PSYCHIATRIC: Mental status normal; no apparent psychiatric issues    Medications:    Current Outpatient Medications   Medication Sig Dispense Refill     gabapentin (NEURONTIN) 300 MG capsule Take 2 capsules (600 mg) by mouth every 8 hours as needed for neuropathic pain. 180 capsule 2     gabapentin (NEURONTIN) 300 MG capsule Take 2 capsules (600 mg) by mouth every 8 hours as needed for neuropathic pain. 180 capsule 1     HYDROcodone-acetaminophen (NORCO) 5-325 MG tablet Take 1 tablet by mouth 2 times daily. 60 tablet 0     ketoconazole (NIZORAL) 2 % external cream Apply topically daily Apply to ears daily 60 g 1     levothyroxine (SYNTHROID/LEVOTHROID) 75 MCG tablet Take 1 tablet (75 mcg) by mouth daily. 90 tablet 1     levothyroxine (SYNTHROID/LEVOTHROID) 75 MCG tablet Take 1 tablet (75 mcg) by mouth daily. 90 tablet 1     naloxone (NARCAN) 4 MG/0.1ML nasal spray Spray 1 spray (4 mg) into one nostril alternating nostrils once as needed for opioid reversal every 2-3 minutes until assistance arrives 0.2 mL 0     neomycin-polymyxin-hydrocortisone (CORTISPORIN) 3.5-66593-9 otic solution Place 3 drops into the right ear 4 times daily. 10 mL 1     pravastatin (PRAVACHOL) 80 MG tablet Take 1 tablet (80 mg) by mouth daily 90 tablet 3     tiZANidine (ZANAFLEX) 4 MG tablet Take 1 tablet (4 mg) by mouth daily as  "needed for muscle spasms 30 tablet 1     triamcinolone (KENALOG) 0.1 % external cream Apply topically 2 times daily. 15 g 1     No current facility-administered medications for this visit.           Past History    Past Medical History:   Diagnosis Date     Anemia      Simple chronic bronchitis (H)      Past Surgical History:   Procedure Laterality Date     Cancer complete hysterectomy       CATARACT EXTRACTION Bilateral      KNEE SURGERY Left      Allergies   Allergen Reactions     Bees Hives     Penicillins      Other reaction(s): \"Local Swelling\"     Family History   Problem Relation Age of Onset     Heart Disease Father      Heart Disease Maternal Grandmother      Heart Disease Maternal Grandfather      Heart Disease Paternal Grandmother      Heart Disease Paternal Grandfather      Hypertension Sister      Cerebrovascular Disease Sister      Hypertension Sister      Social History     Socioeconomic History     Marital status:      Spouse name: None     Number of children: None     Years of education: None     Highest education level: None   Tobacco Use     Smoking status: Former     Current packs/day: 0.00     Average packs/day: 1 pack/day for 55.0 years (55.0 ttl pk-yrs)     Types: Cigarettes     Start date: 1956     Quit date: 8/3/2010     Years since quittin.0     Passive exposure: Past     Smokeless tobacco: Never   Substance and Sexual Activity     Alcohol use: Not Currently     Drug use: Never     Sexual activity: Not Currently     Social Determinants of Health     Financial Resource Strain: Low Risk  (2023)    Financial Resource Strain      Within the past 12 months, have you or your family members you live with been unable to get utilities (heat, electricity) when it was really needed?: No   Food Insecurity: Low Risk  (2023)    Food Insecurity      Within the past 12 months, did you worry that your food would run out before you got money to buy more?: No      Within the past 12 " months, did the food you bought just not last and you didn t have money to get more?: No   Transportation Needs: Unknown (9/29/2023)    Transportation Needs      Within the past 12 months, has lack of transportation kept you from medical appointments, getting your medicines, non-medical meetings or appointments, work, or from getting things that you need?: Patient refused   Interpersonal Safety: Low Risk  (7/2/2024)    Interpersonal Safety      Do you feel physically and emotionally safe where you currently live?: Yes      Within the past 12 months, have you been hit, slapped, kicked or otherwise physically hurt by someone?: No      Within the past 12 months, have you been humiliated or emotionally abused in other ways by your partner or ex-partner?: No   Housing Stability: Low Risk  (9/29/2023)    Housing Stability      Do you have housing? : Yes      Are you worried about losing your housing?: No           Lab Results    Recent Results (from the past 240 hours)   Flow Cytometry    Collection Time: 11/14/24  1:18 PM    Specimen: Peripheral Blood   Result Value Ref Range    Case Report       Flow Cytometry Report                             Case: [id number]                                  Authorizing Provider:  Friedell, Peter E, MD      Collected:           11/14/2024 01:18 PM          Ordering Location:     [facility]   Received:            11/14/2024 01:18 PM                                 [facility]                                                             Pathologist:           Kourtney Mcclendon MD                                                    Specimen:    Peripheral Blood                                                                           Flow Interpretation       A. Peripheral Blood:  -Abnormal CD5 positive B cells (14%)  -Rare circulating myeloid blasts with an unusual immunophenotype (0.1%  -Increased monocytes with abnormal partitioning  -See comment       Comment        There are multiple findings on this flow cytometry study.    Comment 1. CD5+ B cell populations are present with the immunophenotype of chronic lymphocytic leukemia/small lymphocytic lymphoma (CLL/SLL). The B cells include 2 subsets: one monotypic for kappa immunoglobulin light chains, and the other monotypic for lambda. The findings are suggestive of a bi-clonal process.    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 Clinical correlation to evaluate for lymph node involvement is recommended. The monotypic B cells are negative for CD38 and negative for CD49d. CD38 and CD49d are potential prognostic markers in CLL/SLL (Reference: NCCN practice guidelines for CLL/SLL version 4.2021, www.nccn.org).    Comment 2. Rare circulating myeloid blasts are present and show an unusual immunophenotype (including essentially absent expression of CD38). The significance of this finding is uncertain - the blasts may be reactive, or they could represent an underlying clonal myeloid neoplasm. Clinical correlation is required.     Comment 3. Monocytes are increased and include an increased proportion of classical monocytes. In the absence of active autoimmune disease and/or systemic inflammatory syndrome, this finding could be supportive of chronic myelomonocytic leukemia (CMML). Consultation with hematology-oncology is recommended for further evaluation and consideration of bone marrow biopsy.    ____________  Distinction between low-count monoclonal B cell lymphocytosis (MBL), high-count monoclonal B cell lymphocytosis (MBL), and CLL depends on the absolute count of the clonal B cell population (Isaias & Ching. Blood. 2015;126(4):454-462):  Low-count MBL: <0.5 x 10^9/L clonal B cells  High-count MBL: >/= 0.5 x 10^9/L clonal B cells  CLL: >/= 5 x 10^9/L clonal B cells       Flow Phenotypic Data       Unless otherwise indicated, percentages reported  below are based on the total number of CD45 positive viable leukocytes. If applicable, percentage of plasma cells is from total viable nucleated cells.    14% B cells which express CD5, CD19, CD20 (dim), CD23 and lack CD10, CD38, CD49d and CD79b. The B cells have and similar forward scatter relative to background T cells suggestive of similar size; however, precise size determination is deferred to morphology. The B cells include 2 subsets:  A subset monotypic for kappa immunoglobulin light chains (8% of leukocytes)   A subset monotypic for lambda immunoglobulin light chains (6% of leukocytes)    1.1% of the B cells express CD38 at levels above a FMO (fluorescence minus one) negative control. 1.4% of the B cells express CD49d at levels above a FMO negative control.      0.1% myeloid blasts with the following immunophenotype:  Positive for CD13, CD33, CD34, CD45 (dim), , and HLA-DR.   Negative for CD3, CD7, CD10, CD11b, CD14, CD15, CD16, CD19, CD38, CD56, and CD64.  0.1% CD34 positive blasts   69% of the blasts are positive for CD33 (clone P67.6 in APC-R700, relative to background lymphocytes).    20% monocytes, which are predominantly positive for CD14 and CD64 and show possible dim partial expression of CD56. Greater than 94% of the monocytes are negative for CD16, consistent with an increased proportion of classical monocytes.    Also present are:  0.2% polytypic B cells  17% T cells with a CD4:CD8 ratio of 2.8:1.   1.2% NK cells        Flow Processing Information       Multi-color flow analysis is performed for the following markers: CD2, CD3, CD4, CD5, CD7, CD8, CD10, CD11b, CD13, CD14, CD15, CD16, CD19, CD20, CD23, CD33, CD34, CD38, CD45, CD49d, CD56, CD57, CD64, CD79b, , HLA-DR, and kappa and lambda immunoglobulin light chains. Cells are gated to isolate populations (CD45 versus side scatter and forward scatter versus side scatter), to exclude debris (forward scatter versus side scatter) and to  exclude cell doublets (forward scatter height versus forward scatter width and side scatter height versus side scatter width). Forward scatter varies with cell size. Side scatter varies with the amount of cytoplasmic granules. Intensity for CD45 usually increases as hematolymphoid cells mature.      Clinical Information       83 year old female with monocytosis mild thrombocytopenia      FDA Disclaimer       This test was developed and its performance characteristics determined by the [facility]. It has not been cleared or approved by the US Food and Drug Administration.  FDA does not require this test to go through premarket FDA review. This test is used for clinical purposes and should not be regarded as investigational or for research. This laboratory is certified under the Clinical Laboratory Improvement Amendments (CLIA) as qualified to perform high complexity clinical laboratory testing.      [facility] Yes (A[facility] N/A    Performing Labs       The technical component of this testing was completed at [facility].    Stain controls for all stains resulted within this report have been reviewed and show appropriate reactivity.     CBC with platelets and differential    Collection Time: 11/14/24  1:18 PM   Result Value Ref Range    WBC Count 11.7 (H) 4.0 - 11.0 10e3/uL    RBC Count 4.01 3.80 - 5.20 10e6/uL    Hemoglobin 12.7 11.7 - 15.7 g/dL    Hematocrit 41.1 35.0 - 47.0 %     (H) 78 - 100 fL    MCH 31.7 26.5 - 33.0 pg    MCHC 30.9 (L) 31.5 - 36.5 g/dL    RDW 14.1 10.0 - 15.0 %    Platelet Count 114 (L) 150 - 450 10e3/uL    % Neutrophils 44 %    % Lymphocytes 36 %    % Monocytes 19 %    % Eosinophils 0 %    % Basophils 0 %    % Immature Granulocytes 1 %    Absolute Neutrophils 5.2 1.6 - 8.3 10e3/uL    Absolute Lymphocytes 4.2 0.8 - 5.3 10e3/uL    Absolute Monocytes 2.3 (H) 0.0 - 1.3 10e3/uL     Absolute Eosinophils 0.0 0.0 - 0.7 10e3/uL    Absolute Basophils 0.0 0.0 - 0.2 10e3/uL    Absolute Immature Granulocytes 0.1 <=0.4 10e3/uL           I spent 28 minutes on the patient's visit today.  This included preparation for the visit, face-to-face time with the patient and documentation following the visit.  It did not include teaching or procedure time.    Signed by: Peter E. Friedell, MD          Again, thank you for allowing me to participate in the care of your patient.        Sincerely,        Peter E. Friedell, MD

## 2024-11-21 NOTE — PROGRESS NOTES
"Oncology Rooming Note    November 21, 2024 11:06 AM   Zena Emmanuel is a 83 year old female who presents for:    Chief Complaint   Patient presents with    Oncology Clinic Visit     Return visit 3 months with lab. Monocytosis.     Initial Vitals: Pulse 81   Temp 98.7  F (37.1  C) (Oral)   Resp 16   Wt 134.5 kg (296 lb 9.6 oz)   SpO2 92%   BMI 46.45 kg/m   Estimated body mass index is 46.45 kg/m  as calculated from the following:    Height as of 9/17/24: 1.702 m (5' 7\").    Weight as of this encounter: 134.5 kg (296 lb 9.6 oz). Body surface area is 2.52 meters squared.  No Pain (0) Comment: Data Unavailable   No LMP recorded. Patient has had a hysterectomy.  Allergies reviewed: Yes  Medications reviewed: Yes    Medications: Medication refills not needed today.  Pharmacy name entered into [facility]: [facility] #[id number] - [place], [place] - [address] [place] [address] AT SEC OF [facility]    Frailty Screening:   Is the patient here for a new oncology consult visit in cancer care? 2. No      Clinical concerns: none       Brittanie Solano CMA            "

## 2024-11-21 NOTE — PROGRESS NOTES
[facility]: Cancer Care                                                                                          Situation: Chart reviewed by RN Care Coordinator.    Background: Patient was seen in clinic today for follow-up regarding persistent elevation of monocytes and mild thrombocytopenia.    Assessment: Patient remains asymptomatic.    Plan/Recommendations: Patient is to follow-up in 3 months with labs 1 week prior.       Signature:  Faviola Greer  RN Care Coordinator  [facility]

## 2024-11-21 NOTE — PROGRESS NOTES
[facility] Hematology and Oncology Progress Note    Patient: Zena Emmanuel  MRN: [id number]  Date of Service: Nov 21, 2024       Reason for Visit    I was consulted by   Sanjana Escobar MD   Follow up for  persistent elevation of monocytes and mild thrombocytopenia.      Encounter Diagnoses Assessment and Plan:    Problem List Items Addressed This Visit       Monocytosis - Primary    Relevant Orders    CBC with Platelets & Differential    Comprehensive metabolic panel    Flow Cytometry   Patient with persistent monocytosis documented back to 2022 when patient moved to [place] from [place].  Patient notes that her white blood cell count is going up and down for many years.  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      Update 11/21/2024.  Patient returns for follow-up.  She remains asymptomatic.  Flow cytometry in addition to monocytosis consistent with CMML also shows a clone of monoclonal lymphocytes compatible with CLL/SLL.      I advised the patient that I would continue to monitor her disease with evaluation about every 3 months.  I advised she should return earlier if she develops symptoms such as weight loss shortness of breath, fatigue or bruising.  CBC with differential and flow cytometry have been ordered.  Patient will have these a few days before her 3 month visit.            ______________________________________________________________________________      ECOG Performance  1 - Can't do physically strenuous work, but fully ambulatory and can do light sedentary work.    History of Present Illness    Ms. Zena Emmanuel is an 83 year old woman who has generally been in good health.  For the last 2 years since moving to [place] she is had an elevated monocyte count with absolute monocytes in the 2000 range forming about 20% of the white cell differential.  More recently she has had mild thrombocytopenia platelet counts in the  140,000 range.  She has not had chills, fevers or sweats.  She has not had unexplained weight loss.  Does have occasional night sweats but these are not soaking.  She does note mild dyspnea on exertion.  She is a former smoker.  She does not use alcohol to excess.    Review of systems.  Pertinent Findings are included in the History of Present Illness    Physical Exam    /68 (BP Location: Left arm, Patient Position: Sitting, Cuff Size: Adult Large)   Pulse 81   Temp 98.7  F (37.1  C) (Oral)   Resp 16   Wt 134.5 kg (296 lb 9.6 oz)   SpO2 92%   BMI 46.45 kg/m       GENERAL APPEARANCE: 83-year-old woman in no apparent distress.  HEAD: Atraumatic; normocephalic; without lesions.  EYES: Conjunctiva, corneas and eyelids normal; pupils equal, round, reactive to light; No Icterus.  MOUTH/OROPHARYNX: Oral mucosa intact  NECK: Supple with no nodes.  LUNGS:  Clear to auscultation and percussion with no extra sounds.  HEART: Regular rhythm and rate; S1 and S2 normal; no murmurs noted.  ABDOMEN: Soft; no masses or tenderness, no hepatosplenomegaly.  NEUROLOGIC: Alert and oriented.  No obvious focal findings.  EXTREMITIES: No cyanosis, or edema.  SKIN: No abnormal bruising or bleeding. No suspicious lesions noted on exposed skin.  PSYCHIATRIC: Mental status normal; no apparent psychiatric issues    Medications:    Current Outpatient Medications   Medication Sig Dispense Refill    gabapentin (NEURONTIN) 300 MG capsule Take 2 capsules (600 mg) by mouth every 8 hours as needed for neuropathic pain. 180 capsule 2    gabapentin (NEURONTIN) 300 MG capsule Take 2 capsules (600 mg) by mouth every 8 hours as needed for neuropathic pain. 180 capsule 1    HYDROcodone-acetaminophen (NORCO) 5-325 MG tablet Take 1 tablet by mouth 2 times daily. 60 tablet 0    ketoconazole (NIZORAL) 2 % external cream Apply topically daily Apply to ears daily 60 g 1    levothyroxine (SYNTHROID/LEVOTHROID) 75 MCG tablet Take 1 tablet (75 mcg) by  "mouth daily. 90 tablet 1    levothyroxine (SYNTHROID/LEVOTHROID) 75 MCG tablet Take 1 tablet (75 mcg) by mouth daily. 90 tablet 1    naloxone (NARCAN) 4 MG/0.1ML nasal spray Spray 1 spray (4 mg) into one nostril alternating nostrils once as needed for opioid reversal every 2-3 minutes until assistance arrives 0.2 mL 0    neomycin-polymyxin-hydrocortisone (CORTISPORIN) 3.5-56541-9 otic solution Place 3 drops into the right ear 4 times daily. 10 mL 1    pravastatin (PRAVACHOL) 80 MG tablet Take 1 tablet (80 mg) by mouth daily 90 tablet 3    tiZANidine (ZANAFLEX) 4 MG tablet Take 1 tablet (4 mg) by mouth daily as needed for muscle spasms 30 tablet 1    triamcinolone (KENALOG) 0.1 % external cream Apply topically 2 times daily. 15 g 1     No current facility-administered medications for this visit.           Past History    Past Medical History:   Diagnosis Date    Anemia     Simple chronic bronchitis (H)      Past Surgical History:   Procedure Laterality Date    Cancer complete hysterectomy      CATARACT EXTRACTION Bilateral     KNEE SURGERY Left      Allergies   Allergen Reactions    Bees Hives    Penicillins      Other reaction(s): \"Local Swelling\"     Family History   Problem Relation Age of Onset    Heart Disease Father     Heart Disease Maternal Grandmother     Heart Disease Maternal Grandfather     Heart Disease Paternal Grandmother     Heart Disease Paternal Grandfather     Hypertension Sister     Cerebrovascular Disease Sister     Hypertension Sister      Social History     Socioeconomic History    Marital status:      Spouse name: None    Number of children: None    Years of education: None    Highest education level: None   Tobacco Use    Smoking status: Former     Current packs/day: 0.00     Average packs/day: 1 pack/day for 55.0 years (55.0 ttl pk-yrs)     Types: Cigarettes     Start date: 1956     Quit date: 8/3/2010     Years since quittin.0     Passive exposure: Past    Smokeless tobacco: " Never   Substance and Sexual Activity    Alcohol use: Not Currently    Drug use: Never    Sexual activity: Not Currently     Social Determinants of Health     Financial Resource Strain: Low Risk  (9/29/2023)    Financial Resource Strain     Within the past 12 months, have you or your family members you live with been unable to get utilities (heat, electricity) when it was really needed?: No   Food Insecurity: Low Risk  (9/29/2023)    Food Insecurity     Within the past 12 months, did you worry that your food would run out before you got money to buy more?: No     Within the past 12 months, did the food you bought just not last and you didn t have money to get more?: No   Transportation Needs: Unknown (9/29/2023)    Transportation Needs     Within the past 12 months, has lack of transportation kept you from medical appointments, getting your medicines, non-medical meetings or appointments, work, or from getting things that you need?: Patient refused   Interpersonal Safety: Low Risk  (7/2/2024)    Interpersonal Safety     Do you feel physically and emotionally safe where you currently live?: Yes     Within the past 12 months, have you been hit, slapped, kicked or otherwise physically hurt by someone?: No     Within the past 12 months, have you been humiliated or emotionally abused in other ways by your partner or ex-partner?: No   Housing Stability: Low Risk  (9/29/2023)    Housing Stability     Do you have housing? : Yes     Are you worried about losing your housing?: No           Lab Results    Recent Results (from the past 240 hours)   Flow Cytometry    Collection Time: 11/14/24  1:18 PM    Specimen: Peripheral Blood   Result Value Ref Range    Case Report       Flow Cytometry Report                             Case: [id number]                                  Authorizing Provider:  Friedell, Peter E, MD      Collected:           11/14/2024 01:18 PM          Ordering Location:     [facility]    Received:            11/14/2024 01:18 PM                                 [facility]                                                             Pathologist:           Kourtney Mcclendon MD                                                    Specimen:    Peripheral Blood                                                                           Flow Interpretation       A. Peripheral Blood:  -Abnormal CD5 positive B cells (14%)  -Rare circulating myeloid blasts with an unusual immunophenotype (0.1%  -Increased monocytes with abnormal partitioning  -See comment       Comment       There are multiple findings on this flow cytometry study.    Comment 1. CD5+ B cell populations are present with the immunophenotype of chronic lymphocytic leukemia/small lymphocytic lymphoma (CLL/SLL). The B cells include 2 subsets: one monotypic for kappa immunoglobulin light chains, and the other monotypic for lambda. The findings are suggestive of a bi-clonal process.    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 Clinical correlation to evaluate for lymph node involvement is recommended. The monotypic B cells are negative for CD38 and negative for CD49d. CD38 and CD49d are potential prognostic markers in CLL/SLL (Reference: NCCN practice guidelines for CLL/SLL version 4.2021, www.nccn.org).    Comment 2. Rare circulating myeloid blasts are present and show an unusual immunophenotype (including essentially absent expression of CD38). The significance of this finding is uncertain - the blasts may be reactive, or they could represent an underlying clonal myeloid neoplasm. Clinical correlation is required.     Comment 3. Monocytes are increased and include an increased proportion of classical monocytes. In the absence of active autoimmune disease and/or systemic inflammatory syndrome, this finding could be supportive of chronic myelomonocytic leukemia  (CMML). Consultation with hematology-oncology is recommended for further evaluation and consideration of bone marrow biopsy.    ____________  Distinction between low-count monoclonal B cell lymphocytosis (MBL), high-count monoclonal B cell lymphocytosis (MBL), and CLL depends on the absolute count of the clonal B cell population (Isaias & Ching. Blood. 2015;126(4):454-462):  Low-count MBL: <0.5 x 10^9/L clonal B cells  High-count MBL: >/= 0.5 x 10^9/L clonal B cells  CLL: >/= 5 x 10^9/L clonal B cells       Flow Phenotypic Data       Unless otherwise indicated, percentages reported below are based on the total number of CD45 positive viable leukocytes. If applicable, percentage of plasma cells is from total viable nucleated cells.    14% B cells which express CD5, CD19, CD20 (dim), CD23 and lack CD10, CD38, CD49d and CD79b. The B cells have and similar forward scatter relative to background T cells suggestive of similar size; however, precise size determination is deferred to morphology. The B cells include 2 subsets:  A subset monotypic for kappa immunoglobulin light chains (8% of leukocytes)   A subset monotypic for lambda immunoglobulin light chains (6% of leukocytes)    1.1% of the B cells express CD38 at levels above a FMO (fluorescence minus one) negative control. 1.4% of the B cells express CD49d at levels above a FMO negative control.      0.1% myeloid blasts with the following immunophenotype:  Positive for CD13, CD33, CD34, CD45 (dim), , and HLA-DR.   Negative for CD3, CD7, CD10, CD11b, CD14, CD15, CD16, CD19, CD38, CD56, and CD64.  0.1% CD34 positive blasts   69% of the blasts are positive for CD33 (clone P67.6 in APC-R700, relative to background lymphocytes).    20% monocytes, which are predominantly positive for CD14 and CD64 and show possible dim partial expression of CD56. Greater than 94% of the monocytes are negative for CD16, consistent with an increased proportion of classical  monocytes.    Also present are:  0.2% polytypic B cells  17% T cells with a CD4:CD8 ratio of 2.8:1.   1.2% NK cells        Flow Processing Information       Multi-color flow analysis is performed for the following markers: CD2, CD3, CD4, CD5, CD7, CD8, CD10, CD11b, CD13, CD14, CD15, CD16, CD19, CD20, CD23, CD33, CD34, CD38, CD45, CD49d, CD56, CD57, CD64, CD79b, , HLA-DR, and kappa and lambda immunoglobulin light chains. Cells are gated to isolate populations (CD45 versus side scatter and forward scatter versus side scatter), to exclude debris (forward scatter versus side scatter) and to exclude cell doublets (forward scatter height versus forward scatter width and side scatter height versus side scatter width). Forward scatter varies with cell size. Side scatter varies with the amount of cytoplasmic granules. Intensity for CD45 usually increases as hematolymphoid cells mature.      Clinical Information       83 year old female with monocytosis mild thrombocytopenia      FDA Disclaimer       This test was developed and its performance characteristics determined by the [facility]. It has not been cleared or approved by the US Food and Drug Administration.  FDA does not require this test to go through premarket FDA review. This test is used for clinical purposes and should not be regarded as investigational or for research. This laboratory is certified under the Clinical Laboratory Improvement Amendments (CLIA) as qualified to perform high complexity clinical laboratory testing.      MCRS Yes (A) N/A    Performing Labs       The technical component of this testing was completed at [facility].    Stain controls for all stains resulted within this report have been reviewed and show appropriate reactivity.     CBC with platelets and differential    Collection Time: 11/14/24  1:18 PM   Result Value  Ref Range    WBC Count 11.7 (H) 4.0 - 11.0 10e3/uL    RBC Count 4.01 3.80 - 5.20 10e6/uL    Hemoglobin 12.7 11.7 - 15.7 g/dL    Hematocrit 41.1 35.0 - 47.0 %     (H) 78 - 100 fL    MCH 31.7 26.5 - 33.0 pg    MCHC 30.9 (L) 31.5 - 36.5 g/dL    RDW 14.1 10.0 - 15.0 %    Platelet Count 114 (L) 150 - 450 10e3/uL    % Neutrophils 44 %    % Lymphocytes 36 %    % Monocytes 19 %    % Eosinophils 0 %    % Basophils 0 %    % Immature Granulocytes 1 %    Absolute Neutrophils 5.2 1.6 - 8.3 10e3/uL    Absolute Lymphocytes 4.2 0.8 - 5.3 10e3/uL    Absolute Monocytes 2.3 (H) 0.0 - 1.3 10e3/uL    Absolute Eosinophils 0.0 0.0 - 0.7 10e3/uL    Absolute Basophils 0.0 0.0 - 0.2 10e3/uL    Absolute Immature Granulocytes 0.1 <=0.4 10e3/uL           I spent 28 minutes on the patient's visit today.  This included preparation for the visit, face-to-face time with the patient and documentation following the visit.  It did not include teaching or procedure time.    Signed by: Peter E. Friedell, MD

## 2024-11-25 ENCOUNTER — LAB (OUTPATIENT)
Dept: LAB | Facility: CLINIC | Age: 84
End: 2024-11-25
Payer: COMMERCIAL

## 2024-11-25 DIAGNOSIS — G89.4 CHRONIC PAIN SYNDROME: ICD-10-CM

## 2024-11-25 DIAGNOSIS — G89.4 CHRONIC PAIN SYNDROME: Primary | ICD-10-CM

## 2024-11-25 DIAGNOSIS — M54.42 CHRONIC MIDLINE LOW BACK PAIN WITH LEFT-SIDED SCIATICA: ICD-10-CM

## 2024-11-25 DIAGNOSIS — G89.29 CHRONIC MIDLINE LOW BACK PAIN WITH LEFT-SIDED SCIATICA: ICD-10-CM

## 2024-11-25 LAB
AMPHETAMINES UR QL: NOT DETECTED
BARBITURATES UR QL SCN: NOT DETECTED
BENZODIAZ UR QL SCN: NOT DETECTED
BUPRENORPHINE UR QL: NOT DETECTED
CANNABINOIDS UR QL: NOT DETECTED
COCAINE UR QL SCN: NOT DETECTED
D-METHAMPHET UR QL: NOT DETECTED
METHADONE UR QL SCN: NOT DETECTED
OPIATES UR QL SCN: DETECTED
OXYCODONE UR QL SCN: NOT DETECTED
PCP UR QL SCN: NOT DETECTED
TRICYCLICS UR QL SCN: NOT DETECTED

## 2024-12-16 ENCOUNTER — MYC REFILL (OUTPATIENT)
Dept: FAMILY MEDICINE | Facility: CLINIC | Age: 84
End: 2024-12-16
Payer: COMMERCIAL

## 2024-12-16 DIAGNOSIS — G89.29 CHRONIC MIDLINE LOW BACK PAIN WITH LEFT-SIDED SCIATICA: ICD-10-CM

## 2024-12-16 DIAGNOSIS — M54.42 CHRONIC MIDLINE LOW BACK PAIN WITH LEFT-SIDED SCIATICA: ICD-10-CM

## 2024-12-19 RX ORDER — HYDROCODONE BITARTRATE AND ACETAMINOPHEN 5; 325 MG/1; MG/1
1 TABLET ORAL 2 TIMES DAILY
Qty: 60 TABLET | Refills: 0 | Status: SHIPPED | OUTPATIENT
Start: 2024-12-20 | End: 2025-01-19

## 2024-12-19 NOTE — TELEPHONE ENCOUNTER
Reviewed PDMP and no concerns found.    Orders Placed This Encounter   Medications    HYDROcodone-acetaminophen (NORCO) 5-325 MG tablet     Sig: Take 1 tablet by mouth 2 times daily.     Dispense:  60 tablet     Refill:  0     Sanjana Escobar MD

## 2024-12-28 ENCOUNTER — HEALTH MAINTENANCE LETTER (OUTPATIENT)
Age: 84
End: 2024-12-28

## 2025-01-20 ENCOUNTER — MYC REFILL (OUTPATIENT)
Dept: FAMILY MEDICINE | Facility: CLINIC | Age: 85
End: 2025-01-20
Payer: COMMERCIAL

## 2025-01-20 DIAGNOSIS — G89.29 CHRONIC MIDLINE LOW BACK PAIN WITH LEFT-SIDED SCIATICA: ICD-10-CM

## 2025-01-20 DIAGNOSIS — G89.4 CHRONIC PAIN SYNDROME: ICD-10-CM

## 2025-01-20 DIAGNOSIS — M54.42 CHRONIC MIDLINE LOW BACK PAIN WITH LEFT-SIDED SCIATICA: ICD-10-CM

## 2025-01-20 RX ORDER — GABAPENTIN 300 MG/1
600 CAPSULE ORAL EVERY 8 HOURS PRN
Qty: 180 CAPSULE | Refills: 2 | Status: SHIPPED | OUTPATIENT
Start: 2025-01-20

## 2025-01-20 RX ORDER — HYDROCODONE BITARTRATE AND ACETAMINOPHEN 5; 325 MG/1; MG/1
1 TABLET ORAL 2 TIMES DAILY
Qty: 60 TABLET | Refills: 0 | Status: SHIPPED | OUTPATIENT
Start: 2025-01-20

## 2025-01-20 NOTE — TELEPHONE ENCOUNTER
Reviewed PDMP - no concerns  Refilled norco and gabapentin    Pt has upcoming appt on 2/11/25    Sanjana Escobar MD

## 2025-01-20 NOTE — TELEPHONE ENCOUNTER
Outside RN standing orders due to drug class. Routing to PCP for review and fill. Samantha ABRAHAM

## 2025-01-23 ENCOUNTER — TELEPHONE (OUTPATIENT)
Dept: FAMILY MEDICINE | Facility: CLINIC | Age: 85
End: 2025-01-23
Payer: COMMERCIAL

## 2025-01-23 DIAGNOSIS — M54.42 CHRONIC MIDLINE LOW BACK PAIN WITH LEFT-SIDED SCIATICA: Primary | ICD-10-CM

## 2025-01-23 DIAGNOSIS — G89.29 CHRONIC MIDLINE LOW BACK PAIN WITH LEFT-SIDED SCIATICA: Primary | ICD-10-CM

## 2025-01-23 RX ORDER — OXYCODONE HYDROCHLORIDE 5 MG/1
2.5 TABLET ORAL 2 TIMES DAILY
Qty: 30 TABLET | Refills: 0 | Status: SHIPPED | OUTPATIENT
Start: 2025-01-23 | End: 2025-02-22

## 2025-02-11 ENCOUNTER — TELEPHONE (OUTPATIENT)
Dept: FAMILY MEDICINE | Facility: CLINIC | Age: 85
End: 2025-02-11

## 2025-02-11 ENCOUNTER — OFFICE VISIT (OUTPATIENT)
Dept: FAMILY MEDICINE | Facility: CLINIC | Age: 85
End: 2025-02-11
Payer: COMMERCIAL

## 2025-02-11 VITALS
TEMPERATURE: 98 F | BODY MASS INDEX: 45.99 KG/M2 | SYSTOLIC BLOOD PRESSURE: 125 MMHG | HEART RATE: 73 BPM | OXYGEN SATURATION: 94 % | RESPIRATION RATE: 18 BRPM | WEIGHT: 293 LBS | HEIGHT: 67 IN | DIASTOLIC BLOOD PRESSURE: 82 MMHG

## 2025-02-11 DIAGNOSIS — G89.4 CHRONIC PAIN SYNDROME: Primary | ICD-10-CM

## 2025-02-11 DIAGNOSIS — D72.821 MONOCYTOSIS: ICD-10-CM

## 2025-02-11 DIAGNOSIS — M54.50 CHRONIC MIDLINE LOW BACK PAIN WITHOUT SCIATICA: ICD-10-CM

## 2025-02-11 DIAGNOSIS — G89.29 CHRONIC MIDLINE LOW BACK PAIN WITHOUT SCIATICA: ICD-10-CM

## 2025-02-11 DIAGNOSIS — E78.5 HYPERLIPIDEMIA LDL GOAL <130: ICD-10-CM

## 2025-02-11 DIAGNOSIS — E03.9 HYPOTHYROIDISM, UNSPECIFIED TYPE: ICD-10-CM

## 2025-02-11 DIAGNOSIS — F11.20 CONTINUOUS OPIOID DEPENDENCE (H): ICD-10-CM

## 2025-02-11 DIAGNOSIS — M17.0 PRIMARY OSTEOARTHRITIS OF BOTH KNEES: ICD-10-CM

## 2025-02-11 PROBLEM — M17.12 PRIMARY LOCALIZED OSTEOARTHROSIS OF LEFT LOWER LEG: Status: ACTIVE | Noted: 2025-02-11

## 2025-02-11 PROBLEM — N64.52 BLOODY DISCHARGE FROM RIGHT NIPPLE: Status: RESOLVED | Noted: 2018-06-27 | Resolved: 2025-02-11

## 2025-02-11 PROBLEM — M54.30 SCIATICA: Status: RESOLVED | Noted: 2017-12-19 | Resolved: 2025-02-11

## 2025-02-11 LAB
ALBUMIN SERPL BCG-MCNC: 3.6 G/DL (ref 3.5–5.2)
ALP SERPL-CCNC: 83 U/L (ref 40–150)
ALT SERPL W P-5'-P-CCNC: 27 U/L (ref 0–50)
ANION GAP SERPL CALCULATED.3IONS-SCNC: 9 MMOL/L (ref 7–15)
AST SERPL W P-5'-P-CCNC: 31 U/L (ref 0–45)
BASOPHILS # BLD AUTO: 0 10E3/UL (ref 0–0.2)
BASOPHILS NFR BLD AUTO: 0 %
BILIRUB SERPL-MCNC: 0.2 MG/DL
BUN SERPL-MCNC: 19.9 MG/DL (ref 8–23)
CALCIUM SERPL-MCNC: 9.2 MG/DL (ref 8.8–10.4)
CHLORIDE SERPL-SCNC: 107 MMOL/L (ref 98–107)
CREAT SERPL-MCNC: 1 MG/DL (ref 0.51–0.95)
EGFRCR SERPLBLD CKD-EPI 2021: 56 ML/MIN/1.73M2
EOSINOPHIL # BLD AUTO: 0 10E3/UL (ref 0–0.7)
EOSINOPHIL NFR BLD AUTO: 0 %
ERYTHROCYTE [DISTWIDTH] IN BLOOD BY AUTOMATED COUNT: 13.2 % (ref 10–15)
GLUCOSE SERPL-MCNC: 90 MG/DL (ref 70–99)
HCO3 SERPL-SCNC: 27 MMOL/L (ref 22–29)
HCT VFR BLD AUTO: 38.5 % (ref 35–47)
HGB BLD-MCNC: 11.9 G/DL (ref 11.7–15.7)
IMM GRANULOCYTES # BLD: 0 10E3/UL
IMM GRANULOCYTES NFR BLD: 0 %
LYMPHOCYTES # BLD AUTO: 4.6 10E3/UL (ref 0.8–5.3)
LYMPHOCYTES NFR BLD AUTO: 45 %
MCH RBC QN AUTO: 31.8 PG (ref 26.5–33)
MCHC RBC AUTO-ENTMCNC: 30.9 G/DL (ref 31.5–36.5)
MCV RBC AUTO: 103 FL (ref 78–100)
MONOCYTES # BLD AUTO: 1.9 10E3/UL (ref 0–1.3)
MONOCYTES NFR BLD AUTO: 18 %
NEUTROPHILS # BLD AUTO: 3.7 10E3/UL (ref 1.6–8.3)
NEUTROPHILS NFR BLD AUTO: 36 %
PLATELET # BLD AUTO: 110 10E3/UL (ref 150–450)
POTASSIUM SERPL-SCNC: 4.3 MMOL/L (ref 3.4–5.3)
PROT SERPL-MCNC: 6.5 G/DL (ref 6.4–8.3)
RBC # BLD AUTO: 3.74 10E6/UL (ref 3.8–5.2)
SODIUM SERPL-SCNC: 143 MMOL/L (ref 135–145)
WBC # BLD AUTO: 10.2 10E3/UL (ref 4–11)

## 2025-02-11 RX ORDER — LEVOTHYROXINE SODIUM 75 UG/1
75 TABLET ORAL DAILY
Qty: 90 TABLET | Refills: 1 | Status: SHIPPED | OUTPATIENT
Start: 2025-02-11

## 2025-02-11 RX ORDER — PRAVASTATIN SODIUM 80 MG/1
80 TABLET ORAL DAILY
Qty: 90 TABLET | Refills: 3 | Status: CANCELLED | OUTPATIENT
Start: 2025-02-11

## 2025-02-11 NOTE — TELEPHONE ENCOUNTER
Patient Quality Outreach    Patient is due for the following:   Physical Annual Wellness Visit    Action(s) Taken:   LVMTCB 2/11   2nd attempt LVMTCB 2/18  3rd attempt LMTCB 2/19 -RM  Unable to reach pt. Please help assist patient if she calls back.    Type of outreach:    Phone, left message for patient/parent to call back.    Questions for provider review:    None           Patsy Naranjo  Chart routed to Provider.

## 2025-02-11 NOTE — PROGRESS NOTES
Assessment & Plan     85 yo female with a medical history significant for hypertension, hyperlipidemia, hypothyroidism, chronic myelomonocytic leukemia, adenocarcinoma of the uterus s/p hysterectomy in 2017/18, knee osteoporosis, spinal stenosis with chronic back pain and chronic opioid use.      Chronic pain syndrome  Continuous opioid dependence (H)  Primary osteoarthritis of both knees  Chronic midline low back pain without sciatica  Pain source: Per chart review of outside records per prior PCP (records not currently available), patient has history of spinal stenosis.  Also has bilateral knee OA and hip pain (?OA).   Prior imaging: knee xrays from [place] reviewed.  No reports of back or hip imaging on record but has been completed in [place].  Prior/current treatments:  PT but was difficult to do exercises at home.    Has been on opioid pain medication for 20+ years.   Also takes tizanidine as needed and gabapentin (for neuropathy). Knee injection in Sept 2024 was ineffective.  Reports injection in buttocks for pain in the past (?ketorolac).    - today, discussed long term pain plan and concern about taking gabapentin, tizanidine and norco which all have side effects and place her at higher risk of fall.  Hx of osteopenia, concern for hip fracture.  Reviewed other options for pain management - PT, steroid injection.  - continue tizanidine 4 mg daily as needed  - continue norco 5-325 mg BID (or oxycodone with acetaminophen if norco not available).    - patient will consider options of PT, imaging with consideration of injection (epidural, hip joint) and discuss at follow-up.  Is concerned about finances and transportation.    Hypothyroidism, unspecified type  Last TSH on 8/13/24 was therapeutic range.  Refilled levothyroxine  - levothyroxine (SYNTHROID/LEVOTHROID) 75 MCG tablet; Take 1 tablet (75 mcg) by mouth daily.    Hyperlipidemia LDL goal <130  Continue pravastatin 80 mg daily    Monocytosis  Seen/evaluated by  "oncology.   Diagnosed with likely chronic myelomonocytic leukemia and will be evaluated every 3 months.  Evaluate sooner if developing wegiht loss, SOB, fatigue or bruising.   Labs drawn per Dr. Friedell today.  - CBC with Platelets & Differential  - Comprehensive metabolic panel  - Flow Cytometry    HCM  Prior dexa done in 2017 - osteopenia.  Will recommend repeat DEXA  Zoster x 2, 2019  PPV 5/8/2018, PCV 13 9/16/2016  Tdap 9/20/2017  Due for medicare annual.  She is reluctant as she does not like the memory assessments.   Discussed transportation and is not currently interested in metro mobility     Body mass index (BMI) 40.0-44.9, adult (H)  Estimated body mass index is 46.2 kg/m  as calculated from the following:    Height as of this encounter: 1.702 m (5' 7\").    Weight as of this encounter: 133.8 kg (295 lb).   Weight management plan: discuss further at follow-up    Return in about 4 weeks (around 3/11/2025) for using a video visit, Follow up.    ====================================      Subjective   Zena is a 84 year old, presenting for the following health issues:  Follow Up and Refill Request        2/11/2025     9:44 AM   Additional Questions   Roomed by Babita bassett         2/11/2025    Information    services provided? No     HPI     Last office visit was Sept 2024.  Knee injection at that time for moderate-severe osteoarthritis.  No specific concerns.    Chronic pain  Knee OA, bilateral.   No improvement with steroid injection.  Hip OA, bilateral.  Had xrays in [place].  Pain worsening.  Says she had injections in her buttocks.    Back pain, spinal stenosis.  Mid low back.  Sometimes radiates to right.   Did PT in [place], over 5 years ago.  Found it difficult to do exercises.   Taking gabapentin for neuropathy.  Bilateral, just in feet.  Sometimes getting shocks in feet.  Gapapentin helps.  600 mg TID  Tizanidine as needed.  For muscles spasms in left leg.  Improves spasms.  First " "started taking norco over 10 years ago.  1 tablet twice daily.  Helps with back pain.  Does want any knee or back surgery.    Patient Active Problem List   Diagnosis    Chronic kidney disease, stage 2 (mild)    History of cataract    Neoplasm of uncertain behavior of skin    Spinal stenosis of lumbar region    Vitamin D deficiency    Morbid obesity (H)    Continuous opioid dependence (H)    Monocytosis    Primary localized osteoarthrosis of left lower leg    Chronic midline low back pain without sciatica    Chronic pain syndrome    Hypothyroidism, unspecified type     Current Outpatient Medications   Medication Sig Dispense Refill    levothyroxine (SYNTHROID/LEVOTHROID) 75 MCG tablet Take 1 tablet (75 mcg) by mouth daily. 90 tablet 1    gabapentin (NEURONTIN) 300 MG capsule Take 2 capsules (600 mg) by mouth every 8 hours as needed for neuropathic pain. 180 capsule 2    HYDROcodone-acetaminophen (NORCO) 5-325 MG tablet Take 1 tablet by mouth 2 times daily. 60 tablet 0    levothyroxine (SYNTHROID/LEVOTHROID) 75 MCG tablet Take 1 tablet (75 mcg) by mouth daily. 90 tablet 1    naloxone (NARCAN) 4 MG/0.1ML nasal spray Spray 1 spray (4 mg) into one nostril alternating nostrils once as needed for opioid reversal every 2-3 minutes until assistance arrives 0.2 mL 0    oxyCODONE (ROXICODONE) 5 MG tablet Take 0.5 tablets (2.5 mg) by mouth 2 times daily. 30 tablet 0    pravastatin (PRAVACHOL) 80 MG tablet Take 1 tablet (80 mg) by mouth daily 90 tablet 3    tiZANidine (ZANAFLEX) 4 MG tablet Take 1 tablet (4 mg) by mouth daily as needed for muscle spasms 30 tablet 1     No current facility-administered medications for this visit.           Objective    /82   Pulse 73   Temp 98  F (36.7  C)   Resp 18   Ht 1.702 m (5' 7\")   Wt 133.8 kg (295 lb)   SpO2 94%   BMI 46.20 kg/m    Body mass index is 46.2 kg/m .  Physical Exam   GENERAL: alert and no distress  EYES: Eyes grossly normal to inspection.  No discharge or " erythema, or obvious scleral/conjunctival abnormalities.  RESP: No audible wheeze, cough, or visible cyanosis.    SKIN: Visible skin clear. No significant rash, abnormal pigmentation or lesions.  NEURO: Cranial nerves grossly intact.  Mentation and speech appropriate for age.  MSK: using 4 prong cane for ambulation  PSYCH: Appropriate affect, tone, and pace of words      Orders Only on 11/25/2024   Component Date Value Ref Range Status    Cannabinoids (19-azq-5-carboxy-9-T* 11/25/2024 Not Detected  Not Detected, Indeterminate Final    Cutoff for a negative cannabinoid is 50 ng/mL or less.    Phencyclidine 11/25/2024 Not Detected  Not Detected, Indeterminate Final    Cutoff for a negative PCP is 25 ng/mL or less.    Cocaine (Benzoylecgonine) 11/25/2024 Not Detected  Not Detected, Indeterminate Final    Cutoff for a negative cocaine is 150 ng/ml or less.    Methamphetamine (d-Methamphetamine) 11/25/2024 Not Detected  Not Detected, Indeterminate Final    Cutoff for a negative methamphetamine is 500 ng/ml or less.    Opiates (Morphine) 11/25/2024 Detected (A)  Not Detected, Indeterminate Final    Cutoff for a positive opiate is greater than 100 ng/ml.  This is an unconfirmed screening result to be used for medical purposes only.     Amphetamine (d-Amphetamine) 11/25/2024 Not Detected  Not Detected, Indeterminate Final    Cutoff for a negative amphetamine is 500 ng/mL or less.    Benzodiazepines (Nordiazepam) 11/25/2024 Not Detected  Not Detected, Indeterminate Final    Cutoff for a negative benzodiazepine is 150 ng/ml or less.    Tricyclic Antidepressants (Desipra* 11/25/2024 Not Detected  Not Detected, Indeterminate Final    Cutoff for a negative tricyclic antidepressant is 300 ng/ml or less.    Methadone 11/25/2024 Not Detected  Not Detected, Indeterminate Final    Cutoff for a negative methadone is 200 ng/ml or less.    Barbiturates (Butalbital) 11/25/2024 Not Detected  Not Detected, Indeterminate Final    Cutoff for  a negative barbituate is 200 ng/ml or less.    Oxycodone 11/25/2024 Not Detected  Not Detected, Indeterminate Final    Cutoff for a negative oxycodone is 100 ng/mL or less.    Buprenorphine 11/25/2024 Not Detected  Not Detected, Indeterminate Final    Cutoff for a negative buprenorphine is 10 ng/ml or less.     TSH   Date Value Ref Range Status   08/13/2024 1.29 0.30 - 4.20 uIU/mL Final     Last Comprehensive Metabolic Panel:  Lab Results   Component Value Date     03/05/2024    POTASSIUM 4.5 03/05/2024    CHLORIDE 105 03/05/2024    CO2 28 03/05/2024    ANIONGAP 9 03/05/2024    GLC 92 03/05/2024    BUN 18.0 03/05/2024    CR 0.96 (H) 03/05/2024    GFRESTIMATED 59 (L) 03/05/2024    JESSICA 9.5 03/05/2024                 The longitudinal plan of care for the diagnosis(es)/condition(s) as documented were addressed during this visit. Due to the added complexity in care, I will continue to support Zena in the subsequent management and with ongoing continuity of care.    Signed Electronically by: Sanjana Escobar MD

## 2025-02-19 ENCOUNTER — MYC REFILL (OUTPATIENT)
Dept: FAMILY MEDICINE | Facility: CLINIC | Age: 85
End: 2025-02-19
Payer: COMMERCIAL

## 2025-02-19 DIAGNOSIS — M54.42 CHRONIC MIDLINE LOW BACK PAIN WITH LEFT-SIDED SCIATICA: ICD-10-CM

## 2025-02-19 DIAGNOSIS — G89.29 CHRONIC MIDLINE LOW BACK PAIN WITH LEFT-SIDED SCIATICA: ICD-10-CM

## 2025-02-19 RX ORDER — HYDROCODONE BITARTRATE AND ACETAMINOPHEN 5; 325 MG/1; MG/1
1 TABLET ORAL 2 TIMES DAILY
Qty: 60 TABLET | Refills: 0 | Status: SHIPPED | OUTPATIENT
Start: 2025-02-19 | End: 2025-03-21

## 2025-02-19 NOTE — TELEPHONE ENCOUNTER
Sent rx for patient's previous pain medication, norco.    Orders Placed This Encounter   Medications    HYDROcodone-acetaminophen (NORCO) 5-325 MG tablet     Sig: Take 1 tablet by mouth 2 times daily.     Dispense:  60 tablet     Refill:  0       Sanjana Escobar MD

## 2025-02-20 ENCOUNTER — PATIENT OUTREACH (OUTPATIENT)
Dept: ONCOLOGY | Facility: HOSPITAL | Age: 85
End: 2025-02-20

## 2025-02-20 ENCOUNTER — ONCOLOGY VISIT (OUTPATIENT)
Dept: ONCOLOGY | Facility: HOSPITAL | Age: 85
End: 2025-02-20
Payer: COMMERCIAL

## 2025-02-20 VITALS
HEART RATE: 82 BPM | TEMPERATURE: 98.3 F | DIASTOLIC BLOOD PRESSURE: 77 MMHG | SYSTOLIC BLOOD PRESSURE: 106 MMHG | BODY MASS INDEX: 44.41 KG/M2 | OXYGEN SATURATION: 96 % | HEIGHT: 68 IN | WEIGHT: 293 LBS | RESPIRATION RATE: 20 BRPM

## 2025-02-20 DIAGNOSIS — D72.821 MONOCYTOSIS: Primary | ICD-10-CM

## 2025-02-20 PROCEDURE — G0463 HOSPITAL OUTPT CLINIC VISIT: HCPCS | Performed by: INTERNAL MEDICINE

## 2025-02-20 ASSESSMENT — PAIN SCALES - GENERAL: PAINLEVEL_OUTOF10: NO PAIN (0)

## 2025-02-20 NOTE — PROGRESS NOTES
[facility]: Cancer Care                                                                                          Situation: Chart reviewed by RN Care Coordinator.    Background: Patient was in clinic today for follow-up regarding thrombocytopenia.    Assessment: Patient remains asymptomatic.    Plan/Recommendations: Patient is to be seen again in 4 months with labs prior.      Signature:  Faviola Greer  RN Care Coordinator  [facility]

## 2025-02-20 NOTE — LETTER
"2/20/2025      Zena Emmanuel  [address]  [place] [address]      Dear Colleague,    Thank you for referring your patient, Zena Emmanuel, to the [facility]. Please see a copy of my visit note below.    Oncology Rooming Note    February 20, 2025 1:58 PM   Zena Emmanuel is a 84 year old female who presents for:    Chief Complaint   Patient presents with     Oncology Clinic Visit     Neoplasm of uncertain behavior of skin     Initial Vitals: /77   Pulse 82   Temp 98.3  F (36.8  C)   Resp 20   Ht 1.715 m (5' 7.5\")   Wt 134.6 kg (296 lb 12.8 oz)   SpO2 96%   BMI 45.80 kg/m   Estimated body mass index is 45.8 kg/m  as calculated from the following:    Height as of this encounter: 1.715 m (5' 7.5\").    Weight as of this encounter: 134.6 kg (296 lb 12.8 oz). Body surface area is 2.53 meters squared.  No Pain (0) Comment: Data Unavailable   No LMP recorded. Patient has had a hysterectomy.  Allergies reviewed: Yes  Medications reviewed: Yes    Medications: Medication refills not needed today.  Pharmacy name entered into [facility]: [id number] - [place], [place] - [address] [place] [address]    Frailty Screening:   Is the patient here for a new oncology consult visit in cancer care? 2. No    PHQ9:  Did this patient require a PHQ9?: No      Clinical concerns: None      Ingrid Leon LPN               [facility] Progress Note    Patient: Zena Emmanuel  MRN: [id number]  Date of Service: Feb 20, 2025       Reason for Visit    I was consulted by   Sanjana Escobar MD   Follow up for  persistent elevation of monocytes and mild thrombocytopenia.      Encounter Diagnoses Assessment and Plan:    Problem List Items Addressed This Visit       Monocytosis - Primary    Relevant Orders    CBC with Platelets & Differential    Comprehensive metabolic panel    Lactate Dehydrogenase    Flow Cytometry   Patient " "with persistent monocytosis documented back to 2022 when patient moved to [place] from [place].  Patient notes that her white blood cell count is going up and down for many years.  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      Update 11/21/2024.  Patient returns for follow-up.  She remains asymptomatic.  Flow cytometry in addition to monocytosis consistent with CMML also shows a clone of monoclonal lymphocytes compatible with CLL/SLL.      I advised the patient that I would continue to monitor her disease with evaluation about every 4months.  I advised she should return earlier if she develops symptoms such as weight loss shortness of breath, fatigue or bruising.  CMP, LDH,CBC, with differential and flow cytometry have been ordered.  Patient will have these a few days before her visit.            ______________________________________________________________________________      ECOG Performance  1 - Can't do physically strenuous work, but fully ambulatory and can do light sedentary work.    History of Present Illness    Ms. Zena Emmanuel is an 84year old woman who has generally been in good health.  For the last 2 years since moving to [place] she is had an elevated monocyte count with absolute monocytes in the 2000 range forming about 20% of the white cell differential.  More recently she has had mild thrombocytopenia with platelet counts in the 140,000 range.  She has not had chills, fevers or sweats.  She has not had unexplained weight loss.  Does have occasional night sweats but these are not soaking.  She does note mild dyspnea on exertion.  She is a former smoker.  She does not use alcohol to excess.    Review of systems.  Pertinent Findings are included in the History of Present Illness    Physical Exam    /77   Pulse 82   Temp 98.3  F (36.8  C)   Resp 20   Ht 1.715 m (5' 7.5\")   Wt 134.6 kg (296 lb 12.8 oz)   SpO2 96%   BMI 45.80 " kg/m       GENERAL APPEARANCE: 84-year-old woman in no apparent distress.  HEAD: Atraumatic; normocephalic; without lesions.  EYES: Conjunctiva, corneas and eyelids normal; pupils equal, round, reactive to light; No Icterus.  MOUTH/OROPHARYNX: Oral mucosa intact  NECK: Supple with no nodes.  LUNGS:  Clear to auscultation and percussion with no extra sounds.  HEART: Regular rhythm and rate; S1 and S2 normal; no murmurs noted.  ABDOMEN: Soft; no masses or tenderness, no hepatosplenomegaly.  NEUROLOGIC: Alert and oriented.  No obvious focal findings.  EXTREMITIES: No cyanosis, or edema.  SKIN: No abnormal bruising or bleeding. No suspicious lesions noted on exposed skin.  PSYCHIATRIC: Mental status normal; no apparent psychiatric issues    Medications:    Current Outpatient Medications   Medication Sig Dispense Refill     gabapentin (NEURONTIN) 300 MG capsule Take 2 capsules (600 mg) by mouth every 8 hours as needed for neuropathic pain. 180 capsule 2     HYDROcodone-acetaminophen (NORCO) 5-325 MG tablet Take 1 tablet by mouth 2 times daily. 60 tablet 0     levothyroxine (SYNTHROID/LEVOTHROID) 75 MCG tablet Take 1 tablet (75 mcg) by mouth daily. 90 tablet 1     levothyroxine (SYNTHROID/LEVOTHROID) 75 MCG tablet Take 1 tablet (75 mcg) by mouth daily. 90 tablet 1     naloxone (NARCAN) 4 MG/0.1ML nasal spray Spray 1 spray (4 mg) into one nostril alternating nostrils once as needed for opioid reversal every 2-3 minutes until assistance arrives 0.2 mL 0     oxyCODONE (ROXICODONE) 5 MG tablet Take 0.5 tablets (2.5 mg) by mouth 2 times daily. 30 tablet 0     pravastatin (PRAVACHOL) 80 MG tablet Take 1 tablet (80 mg) by mouth daily 90 tablet 3     tiZANidine (ZANAFLEX) 4 MG tablet Take 1 tablet (4 mg) by mouth daily as needed for muscle spasms 30 tablet 1     No current facility-administered medications for this visit.           Past History    Past Medical History:   Diagnosis Date     Anemia      Simple chronic bronchitis  "(H)      Past Surgical History:   Procedure Laterality Date     Cancer complete hysterectomy       CATARACT EXTRACTION Bilateral      KNEE SURGERY Left      Allergies   Allergen Reactions     Bees Hives     Penicillins      Other reaction(s): \"Local Swelling\"     Family History   Problem Relation Age of Onset     Heart Disease Father      Heart Disease Maternal Grandmother      Heart Disease Maternal Grandfather      Heart Disease Paternal Grandmother      Heart Disease Paternal Grandfather      Hypertension Sister      Cerebrovascular Disease Sister      Hypertension Sister      Social History     Socioeconomic History     Marital status:      Spouse name: None     Number of children: None     Years of education: None     Highest education level: None   Tobacco Use     Smoking status: Former     Current packs/day: 0.00     Average packs/day: 1 pack/day for 55.0 years (55.0 ttl pk-yrs)     Types: Cigarettes     Start date: 1956     Quit date: 8/3/2010     Years since quittin.0     Passive exposure: Past     Smokeless tobacco: Never   Substance and Sexual Activity     Alcohol use: Not Currently     Drug use: Never     Sexual activity: Not Currently     Social Determinants of Health     Financial Resource Strain: Low Risk  (2023)    Financial Resource Strain      Within the past 12 months, have you or your family members you live with been unable to get utilities (heat, electricity) when it was really needed?: No   Food Insecurity: Low Risk  (2023)    Food Insecurity      Within the past 12 months, did you worry that your food would run out before you got money to buy more?: No      Within the past 12 months, did the food you bought just not last and you didn t have money to get more?: No   Transportation Needs: Unknown (2023)    Transportation Needs      Within the past 12 months, has lack of transportation kept you from medical appointments, getting your medicines, non-medical meetings " or appointments, work, or from getting things that you need?: Patient refused   Interpersonal Safety: Low Risk  (7/2/2024)    Interpersonal Safety      Do you feel physically and emotionally safe where you currently live?: Yes      Within the past 12 months, have you been hit, slapped, kicked or otherwise physically hurt by someone?: No      Within the past 12 months, have you been humiliated or emotionally abused in other ways by your partner or ex-partner?: No   Housing Stability: Low Risk  (9/29/2023)    Housing Stability      Do you have housing? : Yes      Are you worried about losing your housing?: No           Lab Results    Recent Results (from the past 240 hours)   Comprehensive metabolic panel    Collection Time: 02/11/25 10:18 AM   Result Value Ref Range    Sodium 143 135 - 145 mmol/L    Potassium 4.3 3.4 - 5.3 mmol/L    Carbon Dioxide (CO2) 27 22 - 29 mmol/L    Anion Gap 9 7 - 15 mmol/L    Urea Nitrogen 19.9 8.0 - 23.0 mg/dL    Creatinine 1.00 (H) 0.51 - 0.95 mg/dL    GFR Estimate 56 (L) >60 mL/min/1.73m2    Calcium 9.2 8.8 - 10.4 mg/dL    Chloride 107 98 - 107 mmol/L    Glucose 90 70 - 99 mg/dL    Alkaline Phosphatase 83 40 - 150 U/L    AST 31 0 - 45 U/L    ALT 27 0 - 50 U/L    Protein Total 6.5 6.4 - 8.3 g/dL    Albumin 3.6 3.5 - 5.2 g/dL    Bilirubin Total 0.2 <=1.2 mg/dL   Flow Cytometry    Collection Time: 02/11/25 10:18 AM    Specimen: Peripheral Blood   Result Value Ref Range    Case Report       Flow Cytometry Report                             Case: [id number]                                  Authorizing Provider:  Friedell, Peter E, MD      Collected:           02/11/2025 10:18 AM          Ordering Location:     [facility]   Received:            02/11/2025 10:31 AM                                 [facility]                                                             Pathologist:           Cara Orosco MD                                                     Specimen:     Peripheral Blood                                                                           Flow Interpretation       A. Peripheral Blood:  - Myeloid blasts with unusual immunophenotype (0.1%)  - Increased monocytes with abnormal partitioning  - CD5 positive biclonal-monotypic B cells (14.5%)  - No aberrant immunophenotype on T cells  - See comment      Comment       The immunophenotypic findings are supportive of increased classic monocytes (>94%). In the absence of known active autoimmune disease and/or systemic inflammatory syndrome, this finding could be considered a supportive criterion for the diagnosis of chronic myelomonocytic leukemia (CMML). Consultation with the hematology-oncology team is recommended for further evaluation and possible bone marrow biopsy and concurrent ancillary tests.     A monotypic B cell population with the immunophenotype of chronic lymphocytic leukemia/small lymphocytic lymphoma (CLL/SLL) is present. Based on the reported WBC of 10.2 x 10^9/L, MBL is favored.   Clinical correlation to evaluate for lymph node involvement is recommended.   The monotypic B-cells are composed of 2 clones (one is kappa and the other is lambda). Both populations are CD5 positive and have immunophenotype of CLL/SLL.     Distinction between low-count monoclonal B cell lymphocytosis (MBL), high-count monoclonal B cell lymphocytosis (MBL), and CLL depends on the absolute count of the clonal B cell population.     Low-count MBL: <0.5 x 10^9/L clonal B cells  High-count MBL: >/= 0.5 x 10^9/L clonal B cells  CLL: >/= 5 x 10^9/L clonal B cells    (Isaias & Ching. Blood. 2015;126(4):454-462).    Some of the CD34-positive cells have dim CD38, although it is not at a level that is independently diagnostic of an aberrant immunophenotype on myeloid blasts.         Flow Phenotypic Data       Unless otherwise indicated, percentages reported below are based on the total number of CD45 positive viable leukocytes. If  applicable, percentage of plasma cells is from total viable nucleated cells.    14.5% B cells which express CD5, CD19, CD20, and CD23, biclonal kappa and lambda immunoglobulin light chains, and lack CD10, CD38, CD49d, and CD79b. The B cells have similar forward scatter relative to background T cells suggestive of similar size; however, precise size determination is deferred to morphology.    25% of the B cells express CD38 at levels above a FMO (fluorescence minus one) negative control. 6% of the B cells express CD49d at levels above a FMO negative control.      0.1% blasts with the following immunophenotype:  Positive for CD13, CD33, CD34, CD38 (dim) CD45, , and HLA-DR.   Negative for CD3, CD7, CD10, CD11b, CD14, CD15, CD16, CD19, CD56, and CD64.  0.2% CD34 positive blasts     57% of the blasts are positive for CD33 (clone P67.6 in APC-R700, relative to background lymphocytes).    Also present:  0.3% polytypic B cells    21% T cells with a CD4:CD8 ratio of 3.5:1.   0.7% NK cells      Flow Processing Information       Multi-color flow analysis is performed for the following markers: CD2, CD3, CD4, CD5, CD7, CD8, CD10, CD11b, CD13, CD14, CD15, CD16, CD19, CD20, CD23, CD33, CD34, CD38, CD45, CD49d, CD56, CD57, CD64, CD79b, , HLA-DR, and kappa and lambda immunoglobulin light chains. Cells are gated to isolate populations (CD45 versus side scatter and forward scatter versus side scatter), to exclude debris (forward scatter versus side scatter) and to exclude cell doublets (forward scatter height versus forward scatter width and side scatter height versus side scatter width). Forward scatter varies with cell size. Side scatter varies with the amount of cytoplasmic granules. Intensity for CD45 usually increases as hematolymphoid cells mature.       Clinical Information       84 year old female with history of monocytosis fluctuating total wbc  recent decreased platelets      FDA Disclaimer       This test was  developed and its performance characteristics determined by the [facility]. It has not been cleared or approved by the US Food and Drug Administration.  FDA does not require this test to go through premarket FDA review. This test is used for clinical purposes and should not be regarded as investigational or for research. This laboratory is certified under the Clinical Laboratory Improvement Amendments (CLIA) as qualified to perform high complexity clinical laboratory testing.      MCRS Yes (A) N/A    Performing Labs       The technical component of this testing was completed at [facility].    Stain controls for all stains resulted within this report have been reviewed and show appropriate reactivity.     CBC with platelets and differential    Collection Time: 02/11/25 10:18 AM   Result Value Ref Range    WBC Count 10.2 4.0 - 11.0 10e3/uL    RBC Count 3.74 (L) 3.80 - 5.20 10e6/uL    Hemoglobin 11.9 11.7 - 15.7 g/dL    Hematocrit 38.5 35.0 - 47.0 %     (H) 78 - 100 fL    MCH 31.8 26.5 - 33.0 pg    MCHC 30.9 (L) 31.5 - 36.5 g/dL    RDW 13.2 10.0 - 15.0 %    Platelet Count 110 (L) 150 - 450 10e3/uL    % Neutrophils 36 %    % Lymphocytes 45 %    % Monocytes 18 %    % Eosinophils 0 %    % Basophils 0 %    % Immature Granulocytes 0 %    Absolute Neutrophils 3.7 1.6 - 8.3 10e3/uL    Absolute Lymphocytes 4.6 0.8 - 5.3 10e3/uL    Absolute Monocytes 1.9 (H) 0.0 - 1.3 10e3/uL    Absolute Eosinophils 0.0 0.0 - 0.7 10e3/uL    Absolute Basophils 0.0 0.0 - 0.2 10e3/uL    Absolute Immature Granulocytes 0.0 <=0.4 10e3/uL           I spent 20 minutes on the patient's visit today.  This included preparation for the visit, face-to-face time with the patient and documentation following the visit.  It did not include teaching or procedure time.    Signed by: Peter E. Friedell, MD          Again, thank you for  allowing me to participate in the care of your patient.        Sincerely,        Peter E. Friedell, MD    Electronically signed

## 2025-02-20 NOTE — PROGRESS NOTES
[facility] Progress Note    Patient: Zena Emmanuel  MRN: [id number]  Date of Service: Feb 20, 2025       Reason for Visit    I was consulted by   Sanjana Escobar MD   Follow up for  persistent elevation of monocytes and mild thrombocytopenia.      Encounter Diagnoses Assessment and Plan:    Problem List Items Addressed This Visit       Monocytosis - Primary    Relevant Orders    CBC with Platelets & Differential    Comprehensive metabolic panel    Lactate Dehydrogenase    Flow Cytometry   Patient with persistent monocytosis documented back to 2022 when patient moved to [place] from [place].  Patient notes that her white blood cell count is going up and down for many years.  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      Update 11/21/2024.  Patient returns for follow-up.  She remains asymptomatic.  Flow cytometry in addition to monocytosis consistent with CMML also shows a clone of monoclonal lymphocytes compatible with CLL/SLL.      I advised the patient that I would continue to monitor her disease with evaluation about every 4months.  I advised she should return earlier if she develops symptoms such as weight loss shortness of breath, fatigue or bruising.  CMP, LDH,CBC, with differential and flow cytometry have been ordered.  Patient will have these a few days before her visit.            ______________________________________________________________________________      ECOG Performance  1 - Can't do physically strenuous work, but fully ambulatory and can do light sedentary work.    History of Present Illness    Ms. Zena Emmanuel is an 84year old woman who has generally been in good health.  For the last 2 years since moving to [place] she is had an elevated monocyte count with absolute monocytes in the 2000 range forming about 20% of the white cell differential.  More recently she has had mild thrombocytopenia  "with platelet counts in the 140,000 range.  She has not had chills, fevers or sweats.  She has not had unexplained weight loss.  Does have occasional night sweats but these are not soaking.  She does note mild dyspnea on exertion.  She is a former smoker.  She does not use alcohol to excess.    Review of systems.  Pertinent Findings are included in the History of Present Illness    Physical Exam    /77   Pulse 82   Temp 98.3  F (36.8  C)   Resp 20   Ht 1.715 m (5' 7.5\")   Wt 134.6 kg (296 lb 12.8 oz)   SpO2 96%   BMI 45.80 kg/m       GENERAL APPEARANCE: 84-year-old woman in no apparent distress.  HEAD: Atraumatic; normocephalic; without lesions.  EYES: Conjunctiva, corneas and eyelids normal; pupils equal, round, reactive to light; No Icterus.  MOUTH/OROPHARYNX: Oral mucosa intact  NECK: Supple with no nodes.  LUNGS:  Clear to auscultation and percussion with no extra sounds.  HEART: Regular rhythm and rate; S1 and S2 normal; no murmurs noted.  ABDOMEN: Soft; no masses or tenderness, no hepatosplenomegaly.  NEUROLOGIC: Alert and oriented.  No obvious focal findings.  EXTREMITIES: No cyanosis, or edema.  SKIN: No abnormal bruising or bleeding. No suspicious lesions noted on exposed skin.  PSYCHIATRIC: Mental status normal; no apparent psychiatric issues    Medications:    Current Outpatient Medications   Medication Sig Dispense Refill    gabapentin (NEURONTIN) 300 MG capsule Take 2 capsules (600 mg) by mouth every 8 hours as needed for neuropathic pain. 180 capsule 2    HYDROcodone-acetaminophen (NORCO) 5-325 MG tablet Take 1 tablet by mouth 2 times daily. 60 tablet 0    levothyroxine (SYNTHROID/LEVOTHROID) 75 MCG tablet Take 1 tablet (75 mcg) by mouth daily. 90 tablet 1    levothyroxine (SYNTHROID/LEVOTHROID) 75 MCG tablet Take 1 tablet (75 mcg) by mouth daily. 90 tablet 1    naloxone (NARCAN) 4 MG/0.1ML nasal spray Spray 1 spray (4 mg) into one nostril alternating nostrils once as needed for opioid " "reversal every 2-3 minutes until assistance arrives 0.2 mL 0    oxyCODONE (ROXICODONE) 5 MG tablet Take 0.5 tablets (2.5 mg) by mouth 2 times daily. 30 tablet 0    pravastatin (PRAVACHOL) 80 MG tablet Take 1 tablet (80 mg) by mouth daily 90 tablet 3    tiZANidine (ZANAFLEX) 4 MG tablet Take 1 tablet (4 mg) by mouth daily as needed for muscle spasms 30 tablet 1     No current facility-administered medications for this visit.           Past History    Past Medical History:   Diagnosis Date    Anemia     Simple chronic bronchitis (H)      Past Surgical History:   Procedure Laterality Date    Cancer complete hysterectomy      CATARACT EXTRACTION Bilateral     KNEE SURGERY Left      Allergies   Allergen Reactions    Bees Hives    Penicillins      Other reaction(s): \"Local Swelling\"     Family History   Problem Relation Age of Onset    Heart Disease Father     Heart Disease Maternal Grandmother     Heart Disease Maternal Grandfather     Heart Disease Paternal Grandmother     Heart Disease Paternal Grandfather     Hypertension Sister     Cerebrovascular Disease Sister     Hypertension Sister      Social History     Socioeconomic History    Marital status:      Spouse name: None    Number of children: None    Years of education: None    Highest education level: None   Tobacco Use    Smoking status: Former     Current packs/day: 0.00     Average packs/day: 1 pack/day for 55.0 years (55.0 ttl pk-yrs)     Types: Cigarettes     Start date: 1956     Quit date: 8/3/2010     Years since quittin.0     Passive exposure: Past    Smokeless tobacco: Never   Substance and Sexual Activity    Alcohol use: Not Currently    Drug use: Never    Sexual activity: Not Currently     Social Determinants of Health     Financial Resource Strain: Low Risk  (2023)    Financial Resource Strain     Within the past 12 months, have you or your family members you live with been unable to get utilities (heat, electricity) when it was " really needed?: No   Food Insecurity: Low Risk  (9/29/2023)    Food Insecurity     Within the past 12 months, did you worry that your food would run out before you got money to buy more?: No     Within the past 12 months, did the food you bought just not last and you didn t have money to get more?: No   Transportation Needs: Unknown (9/29/2023)    Transportation Needs     Within the past 12 months, has lack of transportation kept you from medical appointments, getting your medicines, non-medical meetings or appointments, work, or from getting things that you need?: Patient refused   Interpersonal Safety: Low Risk  (7/2/2024)    Interpersonal Safety     Do you feel physically and emotionally safe where you currently live?: Yes     Within the past 12 months, have you been hit, slapped, kicked or otherwise physically hurt by someone?: No     Within the past 12 months, have you been humiliated or emotionally abused in other ways by your partner or ex-partner?: No   Housing Stability: Low Risk  (9/29/2023)    Housing Stability     Do you have housing? : Yes     Are you worried about losing your housing?: No           Lab Results    Recent Results (from the past 240 hours)   Comprehensive metabolic panel    Collection Time: 02/11/25 10:18 AM   Result Value Ref Range    Sodium 143 135 - 145 mmol/L    Potassium 4.3 3.4 - 5.3 mmol/L    Carbon Dioxide (CO2) 27 22 - 29 mmol/L    Anion Gap 9 7 - 15 mmol/L    Urea Nitrogen 19.9 8.0 - 23.0 mg/dL    Creatinine 1.00 (H) 0.51 - 0.95 mg/dL    GFR Estimate 56 (L) >60 mL/min/1.73m2    Calcium 9.2 8.8 - 10.4 mg/dL    Chloride 107 98 - 107 mmol/L    Glucose 90 70 - 99 mg/dL    Alkaline Phosphatase 83 40 - 150 U/L    AST 31 0 - 45 U/L    ALT 27 0 - 50 U/L    Protein Total 6.5 6.4 - 8.3 g/dL    Albumin 3.6 3.5 - 5.2 g/dL    Bilirubin Total 0.2 <=1.2 mg/dL   Flow Cytometry    Collection Time: 02/11/25 10:18 AM    Specimen: Peripheral Blood   Result Value Ref Range    Case Report       Flow  Cytometry Report                             Case: [id number]                                  Authorizing Provider:  Friedell, Peter E, MD      Collected:           02/11/2025 10:18 AM          Ordering Location:     [facility]   Received:            02/11/2025 10:31 AM                                 [facility]                                                             Pathologist:           Cara Orosco MD                                                     Specimen:    Peripheral Blood                                                                           Flow Interpretation       A. Peripheral Blood:  - Myeloid blasts with unusual immunophenotype (0.1%)  - Increased monocytes with abnormal partitioning  - CD5 positive biclonal-monotypic B cells (14.5%)  - No aberrant immunophenotype on T cells  - See comment      Comment       The immunophenotypic findings are supportive of increased classic monocytes (>94%). In the absence of known active autoimmune disease and/or systemic inflammatory syndrome, this finding could be considered a supportive criterion for the diagnosis of chronic myelomonocytic leukemia (CMML). Consultation with the hematology-oncology team is recommended for further evaluation and possible bone marrow biopsy and concurrent ancillary tests.     A monotypic B cell population with the immunophenotype of chronic lymphocytic leukemia/small lymphocytic lymphoma (CLL/SLL) is present. Based on the reported WBC of 10.2 x 10^9/L, MBL is favored.   Clinical correlation to evaluate for lymph node involvement is recommended.   The monotypic B-cells are composed of 2 clones (one is kappa and the other is lambda). Both populations are CD5 positive and have immunophenotype of CLL/SLL.     Distinction between low-count monoclonal B cell lymphocytosis (MBL), high-count monoclonal B cell lymphocytosis (MBL), and CLL depends on the absolute count of the clonal B cell population.      Low-count MBL: <0.5 x 10^9/L clonal B cells  High-count MBL: >/= 0.5 x 10^9/L clonal B cells  CLL: >/= 5 x 10^9/L clonal B cells    (Isaias & Ching. Blood. 2015;126(4):454-462).    Some of the CD34-positive cells have dim CD38, although it is not at a level that is independently diagnostic of an aberrant immunophenotype on myeloid blasts.         Flow Phenotypic Data       Unless otherwise indicated, percentages reported below are based on the total number of CD45 positive viable leukocytes. If applicable, percentage of plasma cells is from total viable nucleated cells.    14.5% B cells which express CD5, CD19, CD20, and CD23, biclonal kappa and lambda immunoglobulin light chains, and lack CD10, CD38, CD49d, and CD79b. The B cells have similar forward scatter relative to background T cells suggestive of similar size; however, precise size determination is deferred to morphology.    25% of the B cells express CD38 at levels above a FMO (fluorescence minus one) negative control. 6% of the B cells express CD49d at levels above a FMO negative control.      0.1% blasts with the following immunophenotype:  Positive for CD13, CD33, CD34, CD38 (dim) CD45, , and HLA-DR.   Negative for CD3, CD7, CD10, CD11b, CD14, CD15, CD16, CD19, CD56, and CD64.  0.2% CD34 positive blasts     57% of the blasts are positive for CD33 (clone P67.6 in APC-R700, relative to background lymphocytes).    Also present:  0.3% polytypic B cells    21% T cells with a CD4:CD8 ratio of 3.5:1.   0.7% NK cells      Flow Processing Information       Multi-color flow analysis is performed for the following markers: CD2, CD3, CD4, CD5, CD7, CD8, CD10, CD11b, CD13, CD14, CD15, CD16, CD19, CD20, CD23, CD33, CD34, CD38, CD45, CD49d, CD56, CD57, CD64, CD79b, , HLA-DR, and kappa and lambda immunoglobulin light chains. Cells are gated to isolate populations (CD45 versus side scatter and forward scatter versus side scatter), to exclude debris  (forward scatter versus side scatter) and to exclude cell doublets (forward scatter height versus forward scatter width and side scatter height versus side scatter width). Forward scatter varies with cell size. Side scatter varies with the amount of cytoplasmic granules. Intensity for CD45 usually increases as hematolymphoid cells mature.       Clinical Information       84 year old female with history of monocytosis fluctuating total wbc  recent decreased platelets      FDA Disclaimer       This test was developed and its performance characteristics determined by the [facility]. It has not been cleared or approved by the US Food and Drug Administration.  FDA does not require this test to go through premarket FDA review. This test is used for clinical purposes and should not be regarded as investigational or for research. This laboratory is certified under the Clinical Laboratory Improvement Amendments (CLIA) as qualified to perform high complexity clinical laboratory testing.      [facility] Yes (A) N/A    Performing Labs       The technical component of this testing was completed at [facility].    Stain controls for all stains resulted within this report have been reviewed and show appropriate reactivity.     CBC with platelets and differential    Collection Time: 02/11/25 10:18 AM   Result Value Ref Range    WBC Count 10.2 4.0 - 11.0 10e3/uL    RBC Count 3.74 (L) 3.80 - 5.20 10e6/uL    Hemoglobin 11.9 11.7 - 15.7 g/dL    Hematocrit 38.5 35.0 - 47.0 %     (H) 78 - 100 fL    MCH 31.8 26.5 - 33.0 pg    MCHC 30.9 (L) 31.5 - 36.5 g/dL    RDW 13.2 10.0 - 15.0 %    Platelet Count 110 (L) 150 - 450 10e3/uL    % Neutrophils 36 %    % Lymphocytes 45 %    % Monocytes 18 %    % Eosinophils 0 %    % Basophils 0 %    % Immature Granulocytes 0 %    Absolute Neutrophils 3.7 1.6 - 8.3 10e3/uL    Absolute  Lymphocytes 4.6 0.8 - 5.3 10e3/uL    Absolute Monocytes 1.9 (H) 0.0 - 1.3 10e3/uL    Absolute Eosinophils 0.0 0.0 - 0.7 10e3/uL    Absolute Basophils 0.0 0.0 - 0.2 10e3/uL    Absolute Immature Granulocytes 0.0 <=0.4 10e3/uL           I spent 20 minutes on the patient's visit today.  This included preparation for the visit, face-to-face time with the patient and documentation following the visit.  It did not include teaching or procedure time.    Signed by: Peter E. Friedell, MD

## 2025-02-20 NOTE — PROGRESS NOTES
"Oncology Rooming Note    February 20, 2025 1:58 PM   Zena Emmanuel is a 84 year old female who presents for:    Chief Complaint   Patient presents with    Oncology Clinic Visit     Neoplasm of uncertain behavior of skin     Initial Vitals: /77   Pulse 82   Temp 98.3  F (36.8  C)   Resp 20   Ht 1.715 m (5' 7.5\")   Wt 134.6 kg (296 lb 12.8 oz)   SpO2 96%   BMI 45.80 kg/m   Estimated body mass index is 45.8 kg/m  as calculated from the following:    Height as of this encounter: 1.715 m (5' 7.5\").    Weight as of this encounter: 134.6 kg (296 lb 12.8 oz). Body surface area is 2.53 meters squared.  No Pain (0) Comment: Data Unavailable   No LMP recorded. Patient has had a hysterectomy.  Allergies reviewed: Yes  Medications reviewed: Yes    Medications: Medication refills not needed today.  Pharmacy name entered into [facility] [employer]: [id number] - [place], [place] - [address] [place] [address]    Frailty Screening:   Is the patient here for a new oncology consult visit in cancer care? 2. No    PHQ9:  Did this patient require a PHQ9?: No      Clinical concerns: None      Ingrid Leon LPN             "

## 2025-03-17 ENCOUNTER — MYC REFILL (OUTPATIENT)
Dept: FAMILY MEDICINE | Facility: CLINIC | Age: 85
End: 2025-03-17
Payer: COMMERCIAL

## 2025-03-17 DIAGNOSIS — M54.42 CHRONIC MIDLINE LOW BACK PAIN WITH LEFT-SIDED SCIATICA: ICD-10-CM

## 2025-03-17 DIAGNOSIS — G89.29 CHRONIC MIDLINE LOW BACK PAIN WITH LEFT-SIDED SCIATICA: ICD-10-CM

## 2025-03-18 RX ORDER — HYDROCODONE BITARTRATE AND ACETAMINOPHEN 5; 325 MG/1; MG/1
1 TABLET ORAL 2 TIMES DAILY
Qty: 60 TABLET | Refills: 0 | Status: SHIPPED | OUTPATIENT
Start: 2025-03-20 | End: 2025-04-19

## 2025-03-18 NOTE — TELEPHONE ENCOUNTER
Reviewed PDMP.  Most recent rx was for norco, filled on 2/19/25.  Due for renewal on 3/20/25.  No concerns on PDMP.  Pain contract is expiring, address at next office visit.  Have discussed a slow taper of norco over next 6 months.    Sanjana Escobar MD

## 2025-04-08 ENCOUNTER — TELEPHONE (OUTPATIENT)
Dept: FAMILY MEDICINE | Facility: CLINIC | Age: 85
End: 2025-04-08
Payer: COMMERCIAL

## 2025-04-08 NOTE — TELEPHONE ENCOUNTER
Patient Quality Outreach    Patient is due for the following:   Physical Preventive Adult Physical    Action(s) Taken:   Patient was scheduled for Medicare Wellness with Dr. Escobar on 4/15/25    Type of outreach:    Schedule with Dr. Agarwal on 4/15/25    Questions for provider review:            Masha Gregorio

## 2025-04-15 ENCOUNTER — OFFICE VISIT (OUTPATIENT)
Dept: FAMILY MEDICINE | Facility: CLINIC | Age: 85
End: 2025-04-15
Payer: COMMERCIAL

## 2025-04-15 VITALS
OXYGEN SATURATION: 95 % | BODY MASS INDEX: 44.41 KG/M2 | DIASTOLIC BLOOD PRESSURE: 80 MMHG | TEMPERATURE: 98 F | HEART RATE: 82 BPM | SYSTOLIC BLOOD PRESSURE: 148 MMHG | WEIGHT: 293 LBS | HEIGHT: 68 IN

## 2025-04-15 DIAGNOSIS — H60.393 INFECTIVE OTITIS EXTERNA, BILATERAL: ICD-10-CM

## 2025-04-15 DIAGNOSIS — L30.9 DERMATITIS: ICD-10-CM

## 2025-04-15 DIAGNOSIS — Z00.00 ENCOUNTER FOR MEDICARE ANNUAL WELLNESS EXAM: Primary | ICD-10-CM

## 2025-04-15 DIAGNOSIS — Z78.0 ASYMPTOMATIC MENOPAUSAL STATE: ICD-10-CM

## 2025-04-15 DIAGNOSIS — Z00.00 WELLNESS EXAMINATION: Primary | ICD-10-CM

## 2025-04-15 DIAGNOSIS — M85.80 OSTEOPENIA, UNSPECIFIED LOCATION: ICD-10-CM

## 2025-04-15 DIAGNOSIS — R03.0 ELEVATED BLOOD PRESSURE READING WITHOUT DIAGNOSIS OF HYPERTENSION: ICD-10-CM

## 2025-04-15 DIAGNOSIS — G89.4 CHRONIC PAIN SYNDROME: ICD-10-CM

## 2025-04-15 PROCEDURE — 99207 PR NO BILLABLE SERVICE THIS VISIT: CPT

## 2025-04-15 RX ORDER — NEOMYCIN SULFATE, POLYMYXIN B SULFATE, HYDROCORTISONE 3.5; 10000; 1 MG/ML; [USP'U]/ML; MG/ML
3 SOLUTION/ DROPS AURICULAR (OTIC) 4 TIMES DAILY
Qty: 10 ML | Refills: 0 | Status: SHIPPED | OUTPATIENT
Start: 2025-04-15

## 2025-04-15 RX ORDER — TRIAMCINOLONE ACETONIDE 1 MG/G
CREAM TOPICAL 2 TIMES DAILY
Qty: 15 G | Refills: 2 | Status: SHIPPED | OUTPATIENT
Start: 2025-04-15

## 2025-04-15 RX ORDER — GABAPENTIN 300 MG/1
600 CAPSULE ORAL 2 TIMES DAILY
Qty: 180 CAPSULE | Refills: 2 | Status: SHIPPED | OUTPATIENT
Start: 2025-04-15 | End: 2025-04-17

## 2025-04-15 SDOH — HEALTH STABILITY: PHYSICAL HEALTH: ON AVERAGE, HOW MANY DAYS PER WEEK DO YOU ENGAGE IN MODERATE TO STRENUOUS EXERCISE (LIKE A BRISK WALK)?: 0 DAYS

## 2025-04-15 ASSESSMENT — SOCIAL DETERMINANTS OF HEALTH (SDOH)
HOW OFTEN DO YOU GET TOGETHER WITH FRIENDS OR RELATIVES?: PATIENT DECLINED
HOW OFTEN DO YOU GET TOGETHER WITH FRIENDS OR RELATIVES?: PATIENT DECLINED

## 2025-04-15 NOTE — PATIENT INSTRUCTIONS
Patient Education   Preventive Care Advice   This is general advice given by our system to help you stay healthy. However, your care team may have specific advice just for you. Please talk to your care team about your preventive care needs.  Nutrition  Eat 5 or more servings of fruits and vegetables each day.  Try wheat bread, brown rice and whole grain pasta (instead of white bread, rice, and pasta).  Get enough calcium and vitamin D. Check the label on foods and aim for 100% of the RDA (recommended daily allowance).  Lifestyle  Exercise at least 150 minutes each week  (30 minutes a day, 5 days a week).  Do muscle strengthening activities 2 days a week. These help control your weight and prevent disease.  No smoking.  Wear sunscreen to prevent skin cancer.  Have a dental exam and cleaning every 6 months.  Yearly exams  See your health care team every year to talk about:  Any changes in your health.  Any medicines your care team has prescribed.  Preventive care, family planning, and ways to prevent chronic diseases.  Shots (vaccines)   HPV shots (up to age 26), if you've never had them before.  Hepatitis B shots (up to age 59), if you've never had them before.  COVID-19 shot: Get this shot when it's due.  Flu shot: Get a flu shot every year.  Tetanus shot: Get a tetanus shot every 10 years.  Pneumococcal, hepatitis A, and RSV shots: Ask your care team if you need these based on your risk.  Shingles shot (for age 50 and up)  General health tests  Diabetes screening:  Starting at age 35, Get screened for diabetes at least every 3 years.  If you are younger than age 35, ask your care team if you should be screened for diabetes.  Cholesterol test: At age 39, start having a cholesterol test every 5 years, or more often if advised.  Bone density scan (DEXA): At age 50, ask your care team if you should have this scan for osteoporosis (brittle bones).  Hepatitis C: Get tested at least once in your life.  STIs (sexually  transmitted infections)  Before age 24: Ask your care team if you should be screened for STIs.  After age 24: Get screened for STIs if you're at risk. You are at risk for STIs (including HIV) if:  You are sexually active with more than one person.  You don't use condoms every time.  You or a partner was diagnosed with a sexually transmitted infection.  If you are at risk for HIV, ask about PrEP medicine to prevent HIV.  Get tested for HIV at least once in your life, whether you are at risk for HIV or not.  Cancer screening tests  Cervical cancer screening: If you have a cervix, begin getting regular cervical cancer screening tests starting at age 21.  Breast cancer scan (mammogram): If you've ever had breasts, begin having regular mammograms starting at age 40. This is a scan to check for breast cancer.  Colon cancer screening: It is important to start screening for colon cancer at age 45.  Have a colonoscopy test every 10 years (or more often if you're at risk) Or, ask your provider about stool tests like a FIT test every year or Cologuard test every 3 years.  To learn more about your testing options, visit:   .  For help making a decision, visit:   https://bit.ly/qz71090.  Prostate cancer screening test: If you have a prostate, ask your care team if a prostate cancer screening test (PSA) at age 55 is right for you.  Lung cancer screening: If you are a current or former smoker ages 50 to 80, ask your care team if ongoing lung cancer screenings are right for you.  For informational purposes only. Not to replace the advice of your health care provider. Copyright   2023 [facility]. All rights reserved. Clinically reviewed by the [facility] Transitions Program. [employer] 637623 - REV 01/24.  Learning About Activities of Daily Living  What are activities of daily living?     Activities of daily living (ADLs) are the basic self-care tasks you do every day. These include eating, bathing, dressing,  and moving around.  As you age, and if you have health problems, you may find that it's harder to do some of these tasks. If so, your doctor can suggest ideas that may help.  To measure what kind of help you may need, your doctor will ask how well you are able to do ADLs. Let your doctor know if there are any tasks that you are having trouble doing. This is an important first step to getting help. And when you have the help you need, you can stay as independent as possible.  How will a doctor assess your ADLs?  Asking about ADLs is part of a routine health checkup your doctor will likely do as you age. Your health check might be done in a doctor's office, in your home, or at a hospital. The goal is to find out if you are having any problems that could make it hard to care for yourself or that make it unsafe for you to be on your own.  To measure your ADLs, your doctor will ask how hard it is for you to do routine tasks. Your doctor may also want to know if you have changed the way you do a task because of a health problem. Your doctor may watch how you:  Walk back and forth.  Keep your balance while you stand or walk.  Move from sitting to standing or from a bed to a chair.  Button or unbutton a shirt or sweater.  Remove and put on your shoes.  It's common to feel a little worried or anxious if you find you can't do all the things you used to be able to do. Talking with your doctor about ADLs is a way to make sure you're as safe as possible and able to care for yourself as well as you can. You may want to bring a caregiver, friend, or family member to your checkup. They can help you talk to your doctor.  Follow-up care is a key part of your treatment and safety. Be sure to make and go to all appointments, and call your doctor if you are having problems. It's also a good idea to know your test results and keep a list of the medicines you take.  Current as of: October 24, 2024  Content Version: 14.4    3827-1732  [employer].   Care instructions adapted under license by your healthcare professional. If you have questions about a medical condition or this instruction, always ask your healthcare professional. [employer] disclaims any warranty or liability for your use of this information.    Preventing Falls: Care Instructions  Injuries and health problems such as trouble walking or poor eyesight can increase your risk of falling. So can some medicines. But there are things you can do to help prevent falls. You can exercise to get stronger. You can also arrange your home to make it safer.    Talk to your doctor about the medicines you take. Ask if any of them increase the risk of falls and whether they can be changed or stopped.   Try to exercise regularly. It can help improve your strength and balance. This can help lower your risk of falling.         Practice fall safety and prevention.   Wear low-heeled shoes that fit well and give your feet good support. Talk to your doctor if you have foot problems that make this hard.  Carry a cellphone or wear a medical alert device that you can use to call for help.  Use stepladders instead of chairs to reach high objects. Don't climb if you're at risk for falls. Ask for help, if needed.  Wear the correct eyeglasses, if you need them.        Make your home safer.   Remove rugs, cords, clutter, and furniture from walkways.  Keep your house well lit. Use night-lights in hallways and bathrooms.  Install and use sturdy handrails on stairways.  Wear nonskid footwear, even inside. Don't walk barefoot or in socks without shoes.        Be safe outside.   Use handrails, curb cuts, and ramps whenever possible.  Keep your hands free by using a shoulder bag or backpack.  Try to walk in well-lit areas. Watch out for uneven ground, changes in pavement, and debris.  Be careful in the winter. Walk on the grass or gravel when sidewalks are slippery. Use de-icer on steps and  "walkways. Add non-slip devices to shoes.    Put grab bars and nonskid mats in your shower or tub and near the toilet. Try to use a shower chair or bath bench when bathing.   Get into a tub or shower by putting in your weaker leg first. Get out with your strong side first. Have a phone or medical alert device in the bathroom with you.   Where can you learn more?  Go to https://www.[employer].net/patiented  Enter G117 in the search box to learn more about \"Preventing Falls: Care Instructions.\"  Current as of: July 31, 2024  Content Version: 14.4    0514-3197 [employer].   Care instructions adapted under license by your healthcare professional. If you have questions about a medical condition or this instruction, always ask your healthcare professional. [employer] disclaims any warranty or liability for your use of this information.    Hearing Loss: Care Instructions  Overview     Hearing loss is a sudden or slow decrease in how well you hear. It can range from slight to profound. Permanent hearing loss can occur with aging. It also can happen when you are exposed long-term to loud noise. Examples include listening to loud music, riding motorcycles, or being around other loud machines.  Hearing loss can affect your work and home life. It can make you feel lonely or depressed. You may feel that you have lost your independence. But hearing aids and other devices can help you hear better and feel connected to others.  Follow-up care is a key part of your treatment and safety. Be sure to make and go to all appointments, and call your doctor if you are having problems. It's also a good idea to know your test results and keep a list of the medicines you take.  How can you care for yourself at home?  Avoid loud noises whenever possible. This helps keep your hearing from getting worse.  Always wear hearing protection around loud noises.  Wear a hearing aid as directed.  A professional can help you pick a " "hearing aid that will work best for you.  You can also get hearing aids over the counter for mild to moderate hearing loss.  Have hearing tests as your doctor suggests. They can show whether your hearing has changed. Your hearing aid may need to be adjusted.  Use other devices as needed. These may include:  Telephone amplifiers and hearing aids that can connect to a television, stereo, radio, or microphone.  Devices that use lights or vibrations. These alert you to the doorbell, a ringing telephone, or a baby monitor.  Television closed-captioning. This shows the words at the bottom of the screen. Most new TVs can do this.  TTY (text telephone). This lets you type messages back and forth on the telephone instead of talking or listening. These devices are also called TDD. When messages are typed on the keyboard, they are sent over the phone line to a receiving TTY. The message is shown on a monitor.  Use text messaging, social media, and email if it is hard for you to communicate by telephone.  Try to learn a listening technique called speechreading. It is not lipreading. You pay attention to people's gestures, expressions, posture, and tone of voice. These clues can help you understand what a person is saying. Face the person you are talking to, and have them face you. Make sure the lighting is good. You need to see the other person's face clearly.  Think about counseling if you need help to adjust to your hearing loss.  When should you call for help?  Watch closely for changes in your health, and be sure to contact your doctor if:    You think your hearing is getting worse.     You have new symptoms, such as dizziness or nausea.   Where can you learn more?  Go to https://www.healthwhoplusyou.net/patiented  Enter R798 in the search box to learn more about \"Hearing Loss: Care Instructions.\"  Current as of: October 27, 2024  Content Version: 14.4    6516-2349 [facility].   Care instructions adapted under license " by your healthcare professional. If you have questions about a medical condition or this instruction, always ask your healthcare professional. [employer] disclaims any warranty or liability for your use of this information.    Chronic Pain: Care Instructions  Your Care Instructions     Chronic pain is pain that lasts a long time (months or even years) and may or may not have a clear cause. It is different from acute pain, which usually does have a clear cause--like an injury or illness--and gets better over time. Chronic pain:  Lasts over time but may vary from day to day.  Does not go away despite efforts to end it.  May disrupt your sleep and lead to fatigue.  May cause depression or anxiety.  May make your muscles tense, causing more pain.  Can disrupt your work, hobbies, home life, and relationships with friends and family.  Chronic pain is a very real condition. It is not just in your head. Treatment can help and usually includes several methods used together, such as medicines, physical therapy, exercise, and other treatments. Learning how to relax and changing negative thought patterns can also help you cope.  Chronic pain is complex. Taking an active role in your treatment will help you better manage your pain. Tell your doctor if you have trouble dealing with your pain. You may have to try several things before you find what works best for you.  Follow-up care is a key part of your treatment and safety. Be sure to make and go to all appointments, and call your doctor if you are having problems. It's also a good idea to know your test results and keep a list of the medicines you take.  How can you care for yourself at home?  Pace yourself. Break up large jobs into smaller tasks. Save harder tasks for days when you have less pain, or go back and forth between hard tasks and easier ones. Take rest breaks.  Relax, and reduce stress. Relaxation techniques such as deep breathing or meditation can  help.  Keep moving. Gentle, daily exercise can help reduce pain over the long run. Try low- or no-impact exercises such as walking, swimming, and stationary biking. Do stretches to stay flexible.  Try heat, cold packs, and massage.  Get enough sleep. Chronic pain can make you tired and drain your energy. Talk with your doctor if you have trouble sleeping because of pain.  Think positive. Your thoughts can affect your pain level. Do things that you enjoy to distract yourself when you have pain instead of focusing on the pain. See a movie, read a book, listen to music, or spend time with a friend.  If you think you are depressed, talk to your doctor about treatment.  Keep a daily pain diary. Record how your moods, thoughts, sleep patterns, activities, and medicine affect your pain. You may find that your pain is worse during or after certain activities or when you are feeling a certain emotion. Having a record of your pain can help you and your doctor find the best ways to treat your pain.  Take pain medicines exactly as directed.  If the doctor gave you a prescription medicine for pain, take it as prescribed.  If you are not taking a prescription pain medicine, ask your doctor if you can take an over-the-counter medicine.  Reducing constipation caused by pain medicine  Talk to your doctor about a laxative. If a laxative doesn't work, your doctor may suggest a prescription medicine.  Include fruits, vegetables, beans, and whole grains in your diet each day. These foods are high in fiber.  If your doctor recommends it, get more exercise. Walking is a good choice. Bit by bit, increase the amount you walk every day. Try for at least 30 minutes on most days of the week.  Schedule time each day for a bowel movement. A daily routine may help. Take your time and do not strain when having a bowel movement.  When should you call for help?   Call your doctor now or seek immediate medical care if:    Your pain gets worse or is  "out of control.     You feel down or blue, or you do not enjoy things like you once did. You may be depressed, which is common in people with chronic pain. Depression can be treated.     You have vomiting or cramps for more than 2 hours.   Watch closely for changes in your health, and be sure to contact your doctor if:    You cannot sleep because of pain.     You are very worried or anxious about your pain.     You have trouble taking your pain medicine.     You have any concerns about your pain medicine.     You have trouble with bowel movements, such as:  No bowel movement in 3 days.  Blood in the anal area, in your stool, or on the toilet paper.  Diarrhea for more than 24 hours.   Where can you learn more?  Go to https://www.[employer].net/patiented  Enter N004 in the search box to learn more about \"Chronic Pain: Care Instructions.\"  Current as of: July 31, 2024  Content Version: 14.4    8119-4593 [employer].   Care instructions adapted under license by your healthcare professional. If you have questions about a medical condition or this instruction, always ask your healthcare professional. [employer] disclaims any warranty or liability for your use of this information.       "

## 2025-04-15 NOTE — PROGRESS NOTES
Preventive Care Visit  [facility]  Sanjana Escobar MD, Family Medicine  Apr 15, 2025      Assessment & Plan     85 yo female with a medical history significant for hypertension, hyperlipidemia, hypothyroidism, chronic myelomonocytic leukemia, adenocarcinoma of the uterus s/p hysterectomy in 2017/18, knee osteoporosis, spinal stenosis with chronic back pain and chronic opioid use.      Encounter for Medicare annual wellness exam  Addressed patient's preventative screenings today and safety.  Current concerns would include her risk of falling due to her musculoskeletal pain issues.  She walks with a cane.  Have advised physical therapy and additional evaluation of her back, hip, knee pain.  See below.  Patient has only mild hearing loss.  She wears glasses and plans to schedule appointment with the eye clinic.  She lives alone but has family in the area.  Has no concerns about mental health.  Memory is intact.  - Lipid Profile; Future.  Have this drawn when she has labs in June.  - declines immunizations today.  Reviewed prior  Zoster x 2, 2019  PPV 5/8/2018, PCV 13 9/16/2016  Tdap 9/20/2017  Does not get vaccines for influenza, RSV, additional covid.   -Due for eye appt in May or June.  Vision works      Chronic pain syndrome  Continuous opioid dependence (H)  Primary osteoarthritis of both knees  Chronic midline low back pain without sciatica  Pain source: Per chart review of outside records per prior PCP (records not currently available), patient has history of spinal stenosis.  Also has bilateral knee OA and hip pain (?OA).   Prior imaging: knee xrays from [place] reviewed.  No reports of back or hip imaging on record but has been completed in [place].  Prior/current treatments:  PT but was difficult to do exercises at home.    Has been on opioid pain medication for 20+ years.   Also takes tizanidine as needed and gabapentin (for neuropathy). Knee injection in Sept 2024 was ineffective.   Reports injection in buttocks for pain in the past (?ketorolac).    - today, discussed long term pain plan and concern about taking gabapentin, tizanidine and norco which all have side effects and place her at higher risk of fall.  Hx of osteopenia, concern for hip fracture.  Reviewed other options for pain management including PT, steroid injection.  - continue tizanidine 4 mg daily as needed  - continue norco 5-325 mg BID   - gabapentin (NEURONTIN) 300 MG capsule; Take 2 capsules (600 mg) by mouth 2 times daily.  - patient will consider options of PT, imaging with consideration of injection (epidural, hip joint) but right now has financial limitations.     - CSA due, will complete at next visit.  Utox as expected on 11/25/24.       Infective otitis externa, bilateral  Chronic, longstanding bilateral otitis externa.  Also has rash of external ears that appears to be a dermatitis.  Will treat again with Cortisporin.  If not improved in 2 weeks we will check an ear culture and consider referral to ENT  - neomycin-polymyxin-hydrocortisone (CORTISPORIN) 3.5-33792-8 otic solution; Place 3 drops into both ears 4 times daily.  - triamcinolone (KENALOG) 0.1 % external cream; Apply topically 2 times daily.    Osteopenia, unspecified location  Asymptomatic menopausal state  Prior dexa done in 2017 showing osteopenia.    - DX Bone Density; Future    Elevated blood pressure reading without diagnosis of hypertension  Periodically elevated.  Recheck in 2 weeks    Hypothyroidism, unspecified type  Last TSH on 8/13/24 was therapeutic range.  Refilled levothyroxine  Due for TSH in Aug 2025     Hyperlipidemia LDL goal <130  Continue pravastatin 80 mg daily     Monocytosis  Seen/evaluated by oncology,  Dr. Friedell.   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  Evaluate sooner if developing wegiht loss, SOB, fatigue or bruising.       Body mass index (BMI) 40.0-44.9, adult (H)  - Lipid  Profile; Future    Patient has been advised of split billing requirements and indicates understanding: No        Counseling  Appropriate preventive services were addressed with this patient via screening, questionnaire, or discussion as appropriate for fall prevention, nutrition, physical activity, Tobacco-use cessation, social engagement, weight loss and cognition.  Checklist reviewing preventive services available has been given to the patient.  Reviewed patient's diet, addressing concerns and/or questions.   The patient was instructed to see the dentist every 6 months.   Updated plan of care.  Patient reported difficulty with activities of daily living were addressed today.The patient was provided with written information regarding signs of hearing loss.   I have reviewed Opioid Use Disorder and Substance Use Disorder risk factors and made any needed referrals. I have reviewed the current pain treatment plan including non-opioid treatment options.    Return in about 2 weeks (around 4/29/2025).    =================================        Subjective   Zena is a 84 year old, presenting for the following:  Wellness Visit        4/15/2025     1:59 PM   Additional Questions   Roomed by Amanda ABRAHAM   Accompanied by self         4/15/2025    Information    services provided? No           HPI    Acute on chronic pain -  Back pain flared recently in right low back and radiates down right leg.    Has some tingling on right leg.Not sleeping well.  Took norco and used topical lidocaine as well.   Reduced gabapentin to twice daily because it was making her sleepy.  Has been on norco for 20 years.  Has tried tramadol, morphine.    Needs triamcinolone for ear rash.  Still has itchiness, sometimes drainage.   Has ear drops at home, uses periodically.  Has used peroxide.       Advance Care Planning  Patient does not have a Health Care Directive: Discussed advance care planning with patient; information given  to patient to review.      4/15/2025   General Health   How would you rate your overall physical health? (!) FAIR   Feel stress (tense, anxious, or unable to sleep) Only a little   (!) STRESS CONCERN      4/15/2025   Nutrition   Diet: Regular (no restrictions)    Low salt       Multiple values from one day are sorted in reverse-chronological order         4/15/2025   Exercise   Days per week of moderate/strenous exercise 0 days   (!) EXERCISE CONCERN      4/15/2025   Social Factors   Frequency of gathering with friends or relatives Patient declined   Worry food won't last until get money to buy more No   Food not last or not have enough money for food? No   Do you have housing? (Housing is defined as stable permanent housing and does not include staying ouside in a car, in a tent, in an abandoned building, in an overnight shelter, or couch-surfing.) Yes   Are you worried about losing your housing? No   Lack of transportation? No   Unable to get utilities (heat,electricity)? No         4/15/2025   Fall Risk   Fallen 2 or more times in the past year? No   Trouble with walking or balance? Yes   Reason Gait Speed Test Not Completed Patient verbalizes unable to perform test      Has back and hip pain.  Uses cane.        4/15/2025   Activities of Daily Living- Home Safety   Needs help with the following daily activites Shopping    Housework    Laundry   Safety concerns in the home None of the above       Multiple values from one day are sorted in reverse-chronological order         4/15/2025   Dental   Dentist two times every year? (!) NO         4/15/2025   Hearing Screening   Hearing concerns? (!) TROUBLE UNDERSTANDING SOFT OR WHISPERED SPEECH.         4/15/2025   Driving Risk Screening   Patient/family members have concerns about driving No         4/15/2025   General Alertness/Fatigue Screening   Have you been more tired than usual lately? No         4/15/2025   Urinary Incontinence Screening   Bothered by leaking  urine in past 6 months No           Today's PHQ-2 Score:       4/15/2025     1:43 PM   PHQ-2 (  [employer])   Q1: Little interest or pleasure in doing things 0   Q2: Feeling down, depressed or hopeless 0   PHQ-2 Score 0    Q1: Little interest or pleasure in doing things Not at all   Q2: Feeling down, depressed or hopeless Not at all   PHQ-2 Score 0       Patient-reported           4/15/2025   Substance Use   Alcohol more than 3/day or more than 7/wk Not Applicable   Do you have a current opioid prescription? (!) YES   How severe/bad is pain from 1 to 10? 10/10   Do you use any other substances recreationally? No         4/15/2025     4:35 PM   OPIOID RISK TOOL TOTAL SCORE   Total Score 0     Low Risk (0-3)  Moderate Risk (4-7)  High Risk (>8)  Social History     Tobacco Use    Smoking status: Former     Current packs/day: 0.00     Average packs/day: 1 pack/day for 55.0 years (55.0 ttl pk-yrs)     Types: Cigarettes     Start date: 1956     Quit date: 8/3/2010     Years since quittin.7     Passive exposure: Past    Smokeless tobacco: Never   Substance Use Topics    Alcohol use: Not Currently    Drug use: Never          Mammogram Screening - After age 74- determine frequency with patient based on health status, life expectancy and patient goals      Reviewed and updated as needed this visit by Provider   Tobacco  Allergies  Meds  Problems  Med Hx  Surg Hx  Fam Hx            Social:  Has family in [place].  Nieces and nephews in [facility].  Lived in [place] until her  .    Patient Active Problem List   Diagnosis    Chronic kidney disease, stage 2 (mild)    History of cataract    Neoplasm of uncertain behavior of skin    Spinal stenosis of lumbar region    Vitamin D deficiency    Morbid obesity (H)    Continuous opioid dependence (H)    Monocytosis    Primary localized osteoarthrosis of left lower leg    Chronic midline low back pain without sciatica    Chronic pain syndrome    Hypothyroidism,  unspecified type     Past Surgical History:   Procedure Laterality Date    Cancer complete hysterectomy      CATARACT EXTRACTION Bilateral     KNEE SURGERY Left        Social History     Tobacco Use    Smoking status: Former     Current packs/day: 0.00     Average packs/day: 1 pack/day for 55.0 years (55.0 ttl pk-yrs)     Types: Cigarettes     Start date: 1956     Quit date: 8/3/2010     Years since quittin.7     Passive exposure: Past    Smokeless tobacco: Never   Substance Use Topics    Alcohol use: Not Currently     Family History   Problem Relation Age of Onset    Heart Disease Father     Heart Disease Maternal Grandmother     Heart Disease Maternal Grandfather     Heart Disease Paternal Grandmother     Heart Disease Paternal Grandfather     Hypertension Sister     Cerebrovascular Disease Sister     Hypertension Sister          Current providers sharing in care for this patient include:  Patient Care Team:  Sanjana Escobar MD as PCP - General (Family Medicine)  Friedell, Peter E, MD as MD (Internal Medicine-Hematology & Oncology)  Faviola Greer, RN as Specialty Care Coordinator (Hematology & Oncology)  Sanjana Escobar MD as Assigned Pain Medication Provider  Sanjana Escobar MD as Assigned PCP  Friedell, Peter E, MD as Assigned Cancer Care Provider    The following health maintenance items are reviewed in Epic and correct as of today:  Health Maintenance   Topic Date Due    HEPATITIS A IMMUNIZATION (1 of 2 - Risk 2-dose series) Never done    RSV VACCINE (1 - 1-dose 75+ series) Never done    ZOSTER IMMUNIZATION (2 of 2) 2020    INFLUENZA VACCINE (1) 2024    COVID-19 Vaccine ( season) 2024    LIPID  2024    EYE EXAM  10/11/2024    CONTROLLED SUBSTANCE AGREEMENT FOR CHRONIC PAIN MANAGEMENT  2025    TSH W/FREE T4 REFLEX  2025    EMIR ASSESSMENT  2025    PHQ-9  2025    URINE DRUG SCREEN  2025    BMP  2026    MEDICARE ANNUAL  "WELLNESS VISIT  04/15/2026    FALL RISK ASSESSMENT  04/15/2026    DTAP/TDAP/TD IMMUNIZATION (2 - Td or Tdap) 09/20/2027    ADVANCE CARE PLANNING  04/15/2030    DEXA  10/11/2032    PHQ-2 (once per calendar year)  Completed    Pneumococcal Vaccine: 50+ Years  Completed    HPV IMMUNIZATION  Aged Out    MENINGITIS IMMUNIZATION  Aged Out    A1C  Discontinued    MICROALBUMIN  Discontinued    DIABETIC FOOT EXAM  Discontinued    URINALYSIS  Discontinued         Review of Systems  Constitutional, HEENT, cardiovascular, pulmonary, gi and gu systems are negative, except as otherwise noted.     Objective    Exam  BP (!) 148/80   Pulse 82   Temp 98  F (36.7  C) (Oral)   Ht 1.72 m (5' 7.72\")   Wt 134.7 kg (297 lb)   SpO2 95%   BMI 45.54 kg/m     Estimated body mass index is 45.54 kg/m  as calculated from the following:    Height as of this encounter: 1.72 m (5' 7.72\").    Weight as of this encounter: 134.7 kg (297 lb).    Physical Exam  GENERAL: alert and no distress  EYES: Eyes grossly normal to inspection, PERRL and conjunctivae and sclerae normal  HENT: normal cephalic/atraumatic, both ears: purulent drainage in canal and rash of pinna, nose and mouth without ulcers or lesions, oropharynx clear, and oral mucous membranes moist  NECK: no adenopathy, no asymmetry, masses, or scars  RESP: lungs clear to auscultation - no rales, rhonchi or wheezes  CV: regular rate and rhythm, normal S1 S2, no S3 or S4, no murmur, click or rub, no peripheral edema   MS: ambulates with cane  Comprehensive back pain exam:  Tenderness of right SI joint, Lower extremity strength functional and equal on both sides, Lower extremity sensation normal and equal on both sides, and Straight leg raise negative bilaterally  PSYCH: mentation appears normal, affect normal/bright        4/15/2025   Mini Cog   Clock Draw Score 2 Normal   3 Item Recall 2 objects recalled   Mini Cog Total Score 4              Signed Electronically by: Sanjana Escobar MD    "

## 2025-04-15 NOTE — PROGRESS NOTES
Seen 2/11/25  Due today:  CML - onc q 4 mo with labs  Chronic pain - imaging hip/back/knee, taper norco.  CSA due. Last done 3/14/24. Utox 11/25/24.  Weight   Lipid.  Vaccines.    83 yo female with a medical history significant for hypertension, hyperlipidemia, hypothyroidism, chronic myelomonocytic leukemia, adenocarcinoma of the uterus s/p hysterectomy in 2017/18, knee osteoporosis, spinal stenosis with chronic back pain and chronic opioid use.       Chronic pain syndrome  Continuous opioid dependence (H)  Primary osteoarthritis of both knees  Chronic midline low back pain without sciatica  Pain source: Per chart review of outside records per prior PCP (records not currently available), patient has history of spinal stenosis.  Also has bilateral knee OA and hip pain (?OA).   Prior imaging: knee xrays from [place] reviewed.  No reports of back or hip imaging on record but has been completed in [place].  Prior/current treatments:  PT but was difficult to do exercises at home.    Has been on opioid pain medication for 20+ years.   Also takes tizanidine as needed and gabapentin (for neuropathy). Knee injection in Sept 2024 was ineffective.  Reports injection in buttocks for pain in the past (?ketorolac).    - today, discussed long term pain plan and concern about taking gabapentin, tizanidine and norco which all have side effects and place her at higher risk of fall.  Hx of osteopenia, concern for hip fracture.  Reviewed other options for pain management - PT, steroid injection.  - continue tizanidine 4 mg daily as needed  - continue norco 5-325 mg BID (or oxycodone with acetaminophen if norco not available).    - patient will consider options of PT, imaging with consideration of injection (epidural, hip joint) and discuss at follow-up *** .  Is concerned about finances and transportation.     Hypothyroidism, unspecified type  Last TSH on 8/13/24 was therapeutic range.  Refilled levothyroxine  - levothyroxine  "(SYNTHROID/LEVOTHROID) 75 MCG tablet; Take 1 tablet (75 mcg) by mouth daily.     Hyperlipidemia LDL goal <130  Continue pravastatin 80 mg daily     Monocytosis  Seen/evaluated by oncology.   Diagnosed with likely chronic myelomonocytic leukemia and will be evaluated every 3 months.  Evaluate sooner if developing wegiht loss, SOB, fatigue or bruising.   Labs drawn per Dr. Friedell today.  - CBC with Platelets & Differential  - Comprehensive metabolic panel  - Flow Cytometry     HCM  Prior dexa done in 2017 - osteopenia.  Will recommend repeat DEXA  Zoster x 2, 2019  PPV 5/8/2018, PCV 13 9/16/2016  Tdap 9/20/2017  Due for medicare annual.  She is reluctant as she does not like the memory assessments.   Discussed transportation and is not currently interested in metro mobility      Body mass index (BMI) 40.0-44.9, adult (H)  Estimated body mass index is 46.2 kg/m  as calculated from the following:    Height as of this encounter: 1.702 m (5' 7\").    Weight as of this encounter: 133.8 kg (295 lb).   Weight management plan: discuss further at follow-up     Return in about 4 weeks (around 3/11/2025) for using a video visit, Follow up. ***    "

## 2025-04-17 ENCOUNTER — MYC REFILL (OUTPATIENT)
Dept: FAMILY MEDICINE | Facility: CLINIC | Age: 85
End: 2025-04-17
Payer: COMMERCIAL

## 2025-04-17 ENCOUNTER — MYC MEDICAL ADVICE (OUTPATIENT)
Dept: FAMILY MEDICINE | Facility: CLINIC | Age: 85
End: 2025-04-17
Payer: COMMERCIAL

## 2025-04-17 DIAGNOSIS — G89.29 CHRONIC MIDLINE LOW BACK PAIN WITH LEFT-SIDED SCIATICA: ICD-10-CM

## 2025-04-17 DIAGNOSIS — M54.42 CHRONIC MIDLINE LOW BACK PAIN WITH LEFT-SIDED SCIATICA: ICD-10-CM

## 2025-04-17 DIAGNOSIS — G89.4 CHRONIC PAIN SYNDROME: ICD-10-CM

## 2025-04-17 RX ORDER — GABAPENTIN 300 MG/1
600 CAPSULE ORAL 2 TIMES DAILY
Qty: 120 CAPSULE | Refills: 2 | Status: SHIPPED | OUTPATIENT
Start: 2025-04-17

## 2025-04-22 RX ORDER — HYDROCODONE BITARTRATE AND ACETAMINOPHEN 5; 325 MG/1; MG/1
1 TABLET ORAL 2 TIMES DAILY
Qty: 60 TABLET | Refills: 0 | Status: SHIPPED | OUTPATIENT
Start: 2025-04-22

## 2025-05-07 ENCOUNTER — HOSPITAL ENCOUNTER (OUTPATIENT)
Dept: BONE DENSITY | Facility: HOSPITAL | Age: 85
Discharge: HOME | End: 2025-05-07
Attending: FAMILY MEDICINE
Payer: COMMERCIAL

## 2025-05-07 DIAGNOSIS — Z78.0 ASYMPTOMATIC MENOPAUSAL STATE: ICD-10-CM

## 2025-05-07 PROCEDURE — 77080 DXA BONE DENSITY AXIAL: CPT

## 2025-05-14 ENCOUNTER — RESULTS FOLLOW-UP (OUTPATIENT)
Dept: FAMILY MEDICINE | Facility: CLINIC | Age: 85
End: 2025-05-14

## 2025-05-21 ENCOUNTER — ANCILLARY PROCEDURE (OUTPATIENT)
Dept: GENERAL RADIOLOGY | Facility: CLINIC | Age: 85
End: 2025-05-21
Attending: FAMILY MEDICINE
Payer: COMMERCIAL

## 2025-05-21 ENCOUNTER — OFFICE VISIT (OUTPATIENT)
Dept: FAMILY MEDICINE | Facility: CLINIC | Age: 85
End: 2025-05-21
Payer: COMMERCIAL

## 2025-05-21 VITALS
DIASTOLIC BLOOD PRESSURE: 82 MMHG | BODY MASS INDEX: 45.83 KG/M2 | TEMPERATURE: 97.9 F | SYSTOLIC BLOOD PRESSURE: 154 MMHG | RESPIRATION RATE: 16 BRPM | HEART RATE: 74 BPM | OXYGEN SATURATION: 95 % | WEIGHT: 292 LBS | HEIGHT: 67 IN

## 2025-05-21 DIAGNOSIS — M25.552 HIP PAIN, LEFT: ICD-10-CM

## 2025-05-21 DIAGNOSIS — M17.12 PRIMARY LOCALIZED OSTEOARTHROSIS OF LEFT LOWER LEG: ICD-10-CM

## 2025-05-21 DIAGNOSIS — G89.4 CHRONIC PAIN SYNDROME: ICD-10-CM

## 2025-05-21 DIAGNOSIS — G89.29 CHRONIC MIDLINE LOW BACK PAIN WITH LEFT-SIDED SCIATICA: ICD-10-CM

## 2025-05-21 DIAGNOSIS — H60.393 INFECTIVE OTITIS EXTERNA, BILATERAL: ICD-10-CM

## 2025-05-21 DIAGNOSIS — M54.42 CHRONIC MIDLINE LOW BACK PAIN WITH LEFT-SIDED SCIATICA: ICD-10-CM

## 2025-05-21 DIAGNOSIS — M81.0 AGE-RELATED OSTEOPOROSIS WITHOUT CURRENT PATHOLOGICAL FRACTURE: Primary | ICD-10-CM

## 2025-05-21 RX ORDER — CHOLECALCIFEROL (VITAMIN D3) 50 MCG
1 TABLET ORAL DAILY
Qty: 90 TABLET | Refills: 3 | Status: SHIPPED | OUTPATIENT
Start: 2025-05-21

## 2025-05-21 RX ORDER — HYDROCODONE BITARTRATE AND ACETAMINOPHEN 5; 325 MG/1; MG/1
1 TABLET ORAL 2 TIMES DAILY
Qty: 60 TABLET | Refills: 0 | Status: SHIPPED | OUTPATIENT
Start: 2025-05-21

## 2025-05-21 RX ORDER — ALENDRONATE SODIUM 70 MG/1
70 TABLET ORAL
Qty: 12 TABLET | Refills: 4 | Status: SHIPPED | OUTPATIENT
Start: 2025-05-21

## 2025-05-21 NOTE — PROGRESS NOTES
[facility] [address] [place] [address]  Phone: [phone]  Fax: [phone]    Patient:  Zena Emmanuel, Date of birth 1940  Date of Visit:  05/21/2025  Referring Provider Referred Self  Reason for visit: ***       Assessment & Plan     Age-related osteoporosis without current pathological fracture:  - Osteoporosis confirmed with a T-score of -3.0 in the right hip.  - Start treatment with Fosamax once a week. Recommend taking vitamin D daily. Consider physical therapy to address strength and mobility.  - Risks and side effects: Monitor for esophageal inflammation and inform the dentist about Fosamax use due to potential jaw bone changes.    Chronic pain syndrome:  - Chronic pain management is complicated by osteoporosis and mobility issues.  - Update imaging tests, including knee X-rays, hip X-ray, and potentially an open MRI of the lumbar spine. Consider physical therapy and explore other pain management options. Plan to gradually reduce hydrocodone by 5 tablets per month.    Chronic midline low back pain with left-sided sciatica:  - Chronic low back pain with sciatica symptoms.  - Update imaging tests and consider physical therapy. Explore other interventions for pain management.    Infective otitis externa, bilateral:  - Possible fungal infection in the ear.  - Perform a swab of the ear to determine the presence of a fungus for targeted treatment.    Primary localized osteoarthrosis of left lower leg:  - Osteoarthritis confirmed in the left knee.  - Update knee X-rays. Consider non-steroid injections if steroid injections are ineffective. Explore orthopedic consultation if necessary.    Hip pain, left:  - Hip pain potentially related to osteoporosis and osteoarthritis.  - Update hip X-rays. Consider orthopedic consultation if necessary.              {[facility] 2021 Documentation (Optional):426474}  {2021 E&M time (Optional):175462}  {Provider  Link to [facility]  "Help Grid :095272}    Subjective   Zena is a 84 year old female who presents for Results and Refill Request (Ear drops and pain med)  History of Present Illness    Zena Emmanuel, 84 years old, reports a history of a posterior disc bulge at the L5-S1 level, identified in X-rays while she was in [place]. She experiences chronic back pain, which has been persistent for many years, and describes having only two pain-free days in the past week. The pain is bilateral across her lower back and does not radiate elsewhere. She also experiences sciatica, characterized by a burning sensation that starts in the hip area and travels down the front of her leg. This burning sensation has been present for about 15 years.    She has been diagnosed with osteoporosis, particularly affecting her right hip, and knee osteoarthritis, described as a bone-on-bone condition. Zena expresses concern about taking medications with sedative effects, particularly hydrocodone, due to her osteoporosis and mobility issues. She mentions that she does not take hydrocodone every day, but uses it when the pain becomes severe, such as after walking long distances.    Zena has mobility issues and is considering the use of an electric wheelchair or scooter for both indoor and outdoor use, as walking causes significant pain. She has difficulty with certain activities due to her knee condition, which she describes as causing a crunching sound and pain when she steps incorrectly. She also mentions ear issues, with symptoms that have recurred since her last visit. Additionally, she is claustrophobic and has difficulty with traditional MRI procedures.         Pertinent history obtain from: {historychoice:979263::\"patient\"}    Objective     Vital signs:  BP (!) 154/82   Pulse 74   Temp 97.9  F (36.6  C)   Resp 16   Ht 1.702 m (5' 7\")   Wt 132.5 kg (292 lb)   SpO2 95%   BMI 45.73 kg/m    Blood pressure (!) 154/82, pulse 74, temperature 97.9  F " "(36.6  C), resp. rate 16, height 1.702 m (5' 7\"), weight 132.5 kg (292 lb), SpO2 95%.  Physical Exam    Vitals:  - Age: 84 years  - Gender: Female    Physical exam:  - Ear examination: Swab taken for possible fungal infection    Imaging results:  - Previous X-ray: Posterior disc bulge at L5-S1 level 2-3 mm  - Bone scan: Osteoporosis, particularly in the right hip    Test results:  - Bone density test: T-score of -3.0 for the right hip, indicating osteoporosis  - Previous bone density test: 1.4         Results            Laboratory data and imaging listed below was reviewed prior to this encounter.    {AMBULATORY ATTESTATION (Optional):566335}    {Do not delete. Used for tracking note template use:955930}     Consent was obtained from the patient to use an AI documentation tool in the creation of  this note          "

## 2025-05-21 NOTE — PROGRESS NOTES
Assessment & Plan     83 yo female with a medical history significant for hypertension, hyperlipidemia, hypothyroidism, chronic myelomonocytic leukemia, adenocarcinoma of the uterus s/p hysterectomy in 2017/18, knee osteoporosis, spinal stenosis with chronic back pain and chronic opioid use.      Age-related osteoporosis without current pathological fracture  Osteoporosis confirmed with a T-score of -3.0 in the right hip and -2.7 in the left hip.  Start treatment with Fosamax once a week. Recommend taking vitamin D daily. Consider physical therapy to address strength and mobility.  - Risks and side effects: Monitor for esophageal inflammation and inform the dentist about Fosamax use due to risk of osteonecrosis    - vitamin D3 (CHOLECALCIFEROL) 50 mcg (2000 units) tablet; Take 1 tablet (50 mcg) by mouth daily.  - alendronate (FOSAMAX) 70 MG tablet; Take 1 tablet (70 mg) by mouth every 7 days.      Chronic pain syndrome  Chronic midline low back pain with left-sided sciatica  Pain source: Per chart review of outside records per prior PCP (records not currently available), patient has history of spinal stenosis.  Also has bilateral knee OA and hip pain (?OA).   Prior imaging: knee xrays from [place] reviewed.  Per patient, prior lumbar spine imaging with disc bulge at L5-S1 in 2018.  Prior/current treatments:  PT but was difficult to do exercises at home.    Has been on opioid pain medication for 20+ years.   Also takes tizanidine as needed and gabapentin (for neuropathy). Knee injection in Sept 2024 was ineffective.  Reports injection in buttocks for pain in the past (?ketorolac).    - again discussed long term pain plan and concern about taking gabapentin, tizanidine and norco which all have side effects and place her at higher risk of fall.  With osteoporosis, concern for hip fracture.  Reviewed other options for pain management including PT, steroid injections.  - continue tizanidine 4 mg daily as needed  - continue  norco 5-325 mg BID   - gabapentin (NEURONTIN) 300 MG capsule; Take 2 capsules (600 mg) by mouth 2 times daily.  - patient agrees with referral for PT and imaging with consideration of injection (epidural, hip joint)    - CSA completed. Utox as expected on 11/25/24.  - Update imaging tests, including knee X-rays, hip X-ray, and potentially an open MRI of the lumbar spine (history of claustrophobia).   - once referrals/plan in place, will gradually reduce hydrocodone by 5 tablets per month.    Referral placed to [facility] for open MRI  [facility] [place]    [address]  [place] [address]  [phone]      Infective otitis externa, bilateral  Recurrent symptoms after treatment with cortisporin, possible fungal otitis externa  -ear swab to determine the presence of a fungus for targeted treatment.  - Fungus Culture, non-blood; Future  - Swab Aerobic Bacterial Culture Routine Without Gram Stain; Future  - Fungus Culture, non-blood  - pending result, refer to ENT    Primary localized osteoarthrosis of left lower leg  Osteoarthritis per prior imaging in 2018  - Update knee X-rays. Consider non-steroid injections as prior steroid injections have been ineffective. Explore orthopedic consultation if necessary.  - XR Knee AP Standing Bilateral; Future    Hip pain, left  Chronic left sided hip/groin pain x 15 years  - XR Pelvis and Hip Bilateral 2 Views; Future    Follow-up   No follow-ups on file.        Subjective   Zena is a 84 year old, presenting for the following health issues:  Results and Refill Request (Ear drops and pain med)        5/21/2025    10:42 AM   Additional Questions   Roomed by Masha bassett   Accompanied by klarissa         5/21/2025    Information    services provided? No     HPI     History of Present Illness-  Zena Emmanuel, 84 years old, here today to follow-up on her chronic conditions, including her chronic pain and recurrent ear  infections.    Chronic low back pain-  Zena recently reviewed her old records and reports a history of a posterior disc bulge at the L5-S1 level, identified on MRI while she was in [place]. She experiences chronic back pain, which has been persistent for many years, and describes having only two pain-free days in the past week. The pain is bilateral across her lower back and does not radiate elsewhere.  She has no numbness or weakness in her legs.  She does, however, experience a burning sensation that starts in the hip area and travels down the front of her leg. This burning sensation has been present for about 15 years.    She mentions that she does not take hydrocodone every day, but uses it when the pain becomes severe, such as after walking long distances.    Zena has mobility issues and is considering the use of an electric wheelchair or scooter for both indoor and outdoor use, as walking causes significant pain.     Left knee osteoarthritis,-  Patient describes her knee OA as a bone-on-bone condition. She has difficulty with certain activities due to her knee condition, which she describes as causing a crunching sound and pain when she steps incorrectly.     Recurrent otitis externa-  Again treated with cortisporin and kenalog for external dermatitis.  Symptoms improved with treatment but are now recurrent again.             Patient Active Problem List   Diagnosis    Chronic kidney disease, stage 2 (mild)    History of cataract    Neoplasm of uncertain behavior of skin    Spinal stenosis of lumbar region    Vitamin D deficiency    Morbid obesity (H)    Continuous opioid dependence (H)    Monocytosis    Primary localized osteoarthrosis of left lower leg    Chronic midline low back pain without sciatica    Chronic pain syndrome    Hypothyroidism, unspecified type    Age-related osteoporosis without current pathological fracture     Current Outpatient Medications   Medication Sig Dispense Refill     "alendronate (FOSAMAX) 70 MG tablet Take 1 tablet (70 mg) by mouth every 7 days. 12 tablet 4    HYDROcodone-acetaminophen (NORCO) 5-325 MG tablet Take 1 tablet by mouth 2 times daily. 60 tablet 0    vitamin D3 (CHOLECALCIFEROL) 50 mcg (2000 units) tablet Take 1 tablet (50 mcg) by mouth daily. 90 tablet 3    gabapentin (NEURONTIN) 300 MG capsule Take 2 capsules (600 mg) by mouth 2 times daily. 120 capsule 2    levothyroxine (SYNTHROID/LEVOTHROID) 75 MCG tablet Take 1 tablet (75 mcg) by mouth daily. 90 tablet 1    levothyroxine (SYNTHROID/LEVOTHROID) 75 MCG tablet Take 1 tablet (75 mcg) by mouth daily. 90 tablet 1    naloxone (NARCAN) 4 MG/0.1ML nasal spray Spray 1 spray (4 mg) into one nostril alternating nostrils once as needed for opioid reversal every 2-3 minutes until assistance arrives 0.2 mL 0    neomycin-polymyxin-hydrocortisone (CORTISPORIN) 3.5-47701-0 otic solution Place 3 drops into both ears 4 times daily. 10 mL 0    pravastatin (PRAVACHOL) 80 MG tablet Take 1 tablet (80 mg) by mouth daily 90 tablet 3    tiZANidine (ZANAFLEX) 4 MG tablet Take 1 tablet (4 mg) by mouth daily as needed for muscle spasms 30 tablet 1    triamcinolone (KENALOG) 0.1 % external cream Apply topically 2 times daily. 15 g 2     No current facility-administered medications for this visit.           Objective    BP (!) 154/82   Pulse 74   Temp 97.9  F (36.6  C)   Resp 16   Ht 1.702 m (5' 7\")   Wt 132.5 kg (292 lb)   SpO2 95%   BMI 45.73 kg/m    Body mass index is 45.73 kg/m .  Physical Exam   GENERAL: alert and no distress  HENT: right ear: with minimal crusting of external canal and left ear: swelling of canal, mild white discharge, scaling rash of ear still present.  RESP: lungs clear to auscultation - no rales, rhonchi or wheezes  CV: regular rate and rhythm, normal S1 S2, no S3 or S4, no murmur, click or rub, no peripheral edema   MS: ambulates with cane      Recent Results (from the past 744 hours)   DX Bone Density    " Narrative    EXAM: DX TBS AXIAL  LOCATION: [facility]  DATE: 5/7/2025    INDICATION:  Asymptomatic menopausal state  DEMOGRAPHICS: Age- 84 years. Gender- Female. Menopausal status- Postmenopausal.  COMPARISON: No prior studies available on the current scanner.  TECHNIQUE: Dual-energy x-ray absorptiometry (DXA) performed with routine technique. Trabecular bone score (TBS) analysis performed.    FINDINGS:    DXA RESULTS  -Lumbar Spine: L1-L4: BMD: 0.992 g/cm2. T-score: -1.6. Z-score: 0.3.   -RIGHT Hip Total: BMD: 0.777 g/cm2. T-score: -1.8. Z-score: 0.4.  -RIGHT Hip Femoral neck: BMD: 0.621 g/cm2. T-score: -3.0. Z-score: -0.7.  -LEFT Hip Total: BMD: 0.755 g/cm2. T-score: -2.0. Z-score: 0.2.  -LEFT Hip Femoral neck: BMD: 0.657 g/cm2. T-score: -2.7. Z-score: -0.4.    WHO T-SCORE CRITERIA  -Normal: T score at or above -1 SD  -Osteopenia: T score between -1 and -2.5 SD  -Osteoporosis: T score at or below -2.5 SD    The World Health Organization (WHO) criteria is applicable to perimenopausal females, postmenopausal females, and men aged 50 years or older.    TBS RESULTS  -Lumbar Spine L1-L4: TBS: 1.169. TBS T-score: -3.2.TBS Z-score: -0.2.    The TBS is a DXA derived measurement for fracture risk assessment, and reflects the structural condition of the bone microarchitecture. It can be used to adjust WHO Fracture Risk Assessment Tool (FRAX) probability of fracture in postmenopausal women and   older men. The calculated probabilities of fracture have been shown to be more accurate when computed with the TBS.    FRACTURE RISK  -The FRAX risk calculator is not applicable due to osteoporosis.    RECOMMENDATIONS  The patient's BMD is consistent with osteoporosis, and he/she is at increased fracture risk. If not currently being treated for low BMD, this would merit treatment according to the Bone Health and Osteoporosis Foundation.      Impression    IMPRESSION: OSTEOPOROSIS. T score meets the WHO  criteria for osteoporosis at one or more measured sites. The risk of osteoporotic fracture increases approximately two-fold for each standard deviation decrease in T-score.              The longitudinal plan of care for the diagnosis(es)/condition(s) as documented were addressed during this visit. Due to the added complexity in care, I will continue to support Zena in the subsequent management and with ongoing continuity of care.    Consent was obtained from the patient to use an AI documentation tool in the creation of this note    65 minutes spent by me on the date of the encounter doing chart review, review of outside records, review of test results, patient visit, and documentation     Signed Electronically by: Sanjana Escobar MD

## 2025-05-21 NOTE — LETTER
Opioid / Opioid Plus Controlled Substance Agreement    This is an agreement between you and your provider about the safe and appropriate use of controlled substance/opioids prescribed by your care team. Controlled substances are medicines that can cause physical and mental dependence (abuse).    There are strict laws about having and using these medicines. We here at [facility] are committing to working with you in your efforts to get better. To support you in this work, we ll help you schedule regular office appointments for medicine refills. If we must cancel or change your appointment for any reason, we ll make sure you have enough medicine to last until your next appointment.     As a Provider, I will:  Listen carefully to your concerns and treat you with respect.   Recommend a treatment plan that I believe is in your best interest. This plan may involve therapies other than opioid pain medication.   Talk with you often about the possible benefits, and the risk of harm of any medicine that we prescribe for you.   Provide a plan on how to taper (discontinue or go off) using this medicine if the decision is made to stop its use.    As a Patient, I understand that opioid(s):   Are a controlled substance prescribed by my care team to help me function or work and manage my condition(s).   Are strong medicines and can cause serious side effects such as:  Drowsiness, which can seriously affect my driving ability  A lower breathing rate, enough to cause death  Harm to my thinking ability   Depression   Abuse of and addiction to this medicine  Need to be taken exactly as prescribed. Combining opioids with certain medicines or chemicals (such as illegal drugs, sedatives, sleeping pills, and benzodiazepines) can be dangerous or even fatal. If I stop opioids suddenly, I may have severe withdrawal symptoms.  Do not work for all types of pain nor for all patients. If they re not helpful, I may be asked to stop  them.        The risks, benefits and side effects of these medicine(s) were explained to me. I agree that:  I will take part in other treatments as advised by my care team. This may be psychiatry or counseling, physical therapy, behavioral therapy, group treatment or a referral to a specialist.     I will keep all my appointments. I understand that this is part of the monitoring of opioids. My care team may require an office visit for EVERY opioid/controlled substance refill. If I miss appointments or don t follow instructions, my care team may stop my medicine.    I will take my medicines as prescribed. I will not change the dose or schedule unless my care team tells me to. There will be no refills if I run out early.     I may be asked to come to the clinic and complete a urine drug test or complete a pill count at any time. If I don t give a urine sample or participate in a pill count, the care team may stop my medicine.    I will only receive prescriptions from this clinic for chronic pain. If I am treated by another provider for acute pain issues, I will tell them that I am taking opioid pain medication for chronic pain and that I have a treatment agreement with this provider. I will inform my [facility] care team within one business day if I am given a prescription for any pain medication by another healthcare provider. My [facility] care team can contact other providers and pharmacists about my use of any medicines.    It is up to me to make sure that I don t run out of my medicines on weekends or holidays. If my care team is willing to refill my opioid prescription without a visit, I must request refills only during office hours. Refills may take up to 3 business days to process. I will use one pharmacy to fill all my opioid and other controlled substance prescriptions. I will notify the clinic about any changes to my insurance or medication availability.    I am responsible for my  prescriptions. If the medicine/prescription is lost, stolen or destroyed, it will not be replaced. I also agree not to share controlled substance medicines with anyone.    I am aware I should not use any illegal or recreational drugs. I agree not to drink alcohol unless my care team says I can.       If I enroll in the [place] [facility] Cannabis program, I will tell my care team prior to my next refill.     I will tell my care team right away if I become pregnant, have a new medical problem treated outside of my regular clinic, or have a change in my medications.    I understand that this medicine can affect my thinking, judgment and reaction time. Alcohol and drugs affect the brain and body, which can affect the safety of my driving. Being under the influence of alcohol or drugs can affect my decision-making, behaviors, personal safety, and the safety of others. Driving while impaired (DWI) can occur if a person is driving, operating, or in physical control of a car, motorcycle, boat, snowmobile, ATV, motorbike, off-road vehicle, or any other motor vehicle ([place] Statute 169A.20). I understand the risk if I choose to drive or operate any vehicle or machinery.    I understand that if I do not follow any of the conditions above, my prescriptions or treatment may be stopped or changed.          Opioids  What You Need to Know    What are opioids?   Opioids are pain medicines that must be prescribed by a doctor. They are also known as narcotics.     Examples are:   morphine (MS Contin, Soledad)  oxycodone (Oxycontin)  oxycodone and acetaminophen (Percocet)  hydrocodone and acetaminophen (Vicodin, Norco)   fentanyl patch (Duragesic)   hydromorphone (Dilaudid)   methadone  codeine (Tylenol #3)     What do opioids do well?   Opioids are best for severe short-term pain such as after a surgery or injury. They may work well for cancer pain. They may help some people with long-lasting (chronic) pain.     What do opioids NOT do  well?   Opioids never get rid of pain entirely, and they don t work well for most patients with chronic pain. Opioids don t reduce swelling, one of the causes of pain.                                    Other ways to manage chronic pain and improve function include:     Treat the health problem that may be causing pain  Anti-inflammation medicines, which reduce swelling and tenderness, such as ibuprofen (Advil, Motrin) or naproxen (Aleve)  Acetaminophen (Tylenol)  Antidepressants and anti-seizure medicines, especially for nerve pain  Topical treatments such as patches or creams  Injections or nerve blocks  Chiropractic or osteopathic treatment  Acupuncture, massage, deep breathing, meditation, visual imagery, aromatherapy  Use heat or ice at the pain site  Physical therapy   Exercise  Stop smoking  Take part in therapy       Risks and side effects     Talk to your doctor before you start or decide to keep taking opioids. Possible side effects include:    Lowering your breathing rate enough to cause death  Overdose, including death, especially if taking higher than prescribed doses  Worse depression symptoms; less pleasure in things you usually enjoy  Feeling tired or sluggish  Slower thoughts or cloudy thinking  Being more sensitive to pain over time; pain is harder to control  Trouble sleeping or restless sleep  Changes in hormone levels (for example, less testosterone)  Changes in sex drive or ability to have sex  Constipation  Unsafe driving  Itching and sweating  Dizziness  Nausea, throwing up and dry mouth    What else should I know about opioids?    Opioids may lead to dependence, tolerance, or addiction.    Dependence means that if you stop or reduce the medicine too quickly, you will have withdrawal symptoms. These include loose poop (diarrhea), jitters, flu-like symptoms, nervousness and tremors. Dependence is not the same as addiction.                     Tolerance means needing higher doses over time to  get the same effect. This may increase the chance of serious side effects.    Addiction is when people improperly use a substance that harms their body, their mind or their relations with others. Use of opiates can cause a relapse of addiction if you have a history of drug or alcohol abuse.    People who have used opioids for a long time may have a lower quality of life, worse depression, higher levels of pain and more visits to doctors.    You can overdose on opioids. Take these steps to lower your risk of overdose:    Recognize the signs:  Signs of overdose include decrease or loss of consciousness (blackout), slowed breathing, trouble waking up and blue lips. If someone is worried about overdose, they should call 911.    Talk to your doctor about Narcan (naloxone).   If you are at risk for overdose, you may be given a prescription for Narcan. This medicine very quickly reverses the effects of opioids.   If you overdose, a friend or family member can give you Narcan while waiting for the ambulance. They need to know the signs of overdose and how to give Narcan.     Don't use alcohol or street drugs.   Taking them with opioids can cause death.    Do not take any of these medicines unless your doctor says it s OK. Taking these with opioids can cause death:  Benzodiazepines, such as lorazepam (Ativan), alprazolam (Xanax) or diazepam (Valium)  Muscle relaxers, such as cyclobenzaprine (Flexeril)  Sleeping pills like zolpidem (Ambien)   Other opioids      How to keep you and other people safe while taking opioids:    Never share your opioids with others.  Opioid medicines are regulated by the Drug Enforcement Agency ([place]). Selling or sharing medications is a criminal act.    2. Be sure to store opioids in a secure place, locked up if possible. Young children can easily swallow them and overdose.    3. When you are traveling with your medicines, keep them in the original bottles. If you use a pill box, be sure you also  carry a copy of your medicine list from your clinic or pharmacy.    4. Safe disposal of opioids    Most pharmacies have places to get rid of medicine, called disposal kiosks. Medicine disposal options are also available in every [facility]. Search your county and  medication disposal  to find more options. You can find more details at:  https://www.[email or web address]./living-green/managing-[email or web address]-medications     I agree that my provider, clinic care team, and pharmacy may work with any city, state or federal law enforcement agency that investigates the misuse, sale, or other diversion of my controlled medicine. I will allow my provider to discuss my care with, or share a copy of, this agreement with any other treating provider, pharmacy or emergency room where I receive care.    I have read this agreement and have asked questions about anything I did not understand.    _______________________________________________________  Patient Signature - Zena Emmanuel _____________________                   Date     _______________________________________________________  Provider Signature - Sanjana Escobar MD   _____________________                   Date     _______________________________________________________  Witness Signature (required if provider not present while patient signing)   _____________________                   Date

## 2025-05-22 LAB
BACTERIA SPEC CULT: ABNORMAL
BACTERIA SPEC CULT: ABNORMAL
BACTERIA SPEC CULT: NORMAL

## 2025-05-23 DIAGNOSIS — H60.393 INFECTIVE OTITIS EXTERNA, BILATERAL: ICD-10-CM

## 2025-05-23 RX ORDER — NEOMYCIN SULFATE, POLYMYXIN B SULFATE AND HYDROCORTISONE 3.5; 10000; 1 MG/ML; [IU]/ML; MG/ML
3 SOLUTION AURICULAR (OTIC) 4 TIMES DAILY
Qty: 10 ML | Status: CANCELLED | OUTPATIENT
Start: 2025-05-23

## 2025-05-27 ENCOUNTER — TELEPHONE (OUTPATIENT)
Dept: FAMILY MEDICINE | Facility: CLINIC | Age: 85
End: 2025-05-27
Payer: COMMERCIAL

## 2025-05-27 LAB — BACTERIA SPEC CULT: ABNORMAL

## 2025-05-27 NOTE — TELEPHONE ENCOUNTER
When pt return call, per Dr Escobar, she would like a appt with her. In person or virtually is okay.    Please help schedule when patient return call. Thanks

## 2025-05-28 DIAGNOSIS — E78.5 HYPERLIPIDEMIA LDL GOAL <130: ICD-10-CM

## 2025-05-28 DIAGNOSIS — H60.393 INFECTIVE OTITIS EXTERNA, BILATERAL: ICD-10-CM

## 2025-05-28 RX ORDER — PRAVASTATIN SODIUM 80 MG/1
80 TABLET ORAL DAILY
Qty: 90 TABLET | Refills: 3 | Status: SHIPPED | OUTPATIENT
Start: 2025-05-28

## 2025-05-29 NOTE — TELEPHONE ENCOUNTER
Reached out & left message for patient. Please help schedule a follow up visit to discuss results per Shawn/in basket if patient calls back.

## 2025-06-03 ENCOUNTER — MYC MEDICAL ADVICE (OUTPATIENT)
Dept: CARE COORDINATION | Facility: CLINIC | Age: 85
End: 2025-06-03
Payer: COMMERCIAL

## 2025-06-09 ENCOUNTER — LAB (OUTPATIENT)
Dept: LAB | Facility: CLINIC | Age: 85
End: 2025-06-09
Payer: COMMERCIAL

## 2025-06-09 DIAGNOSIS — D72.821 MONOCYTOSIS: ICD-10-CM

## 2025-06-09 DIAGNOSIS — Z00.00 ENCOUNTER FOR MEDICARE ANNUAL WELLNESS EXAM: ICD-10-CM

## 2025-06-09 LAB
ALBUMIN SERPL BCG-MCNC: 3.8 G/DL (ref 3.5–5.2)
ALP SERPL-CCNC: 99 U/L (ref 40–150)
ALT SERPL W P-5'-P-CCNC: 20 U/L (ref 0–50)
ANION GAP SERPL CALCULATED.3IONS-SCNC: 11 MMOL/L (ref 7–15)
AST SERPL W P-5'-P-CCNC: 26 U/L (ref 0–45)
BASOPHILS # BLD AUTO: 0 10E3/UL (ref 0–0.2)
BASOPHILS NFR BLD AUTO: 0 %
BILIRUB SERPL-MCNC: 0.3 MG/DL
BUN SERPL-MCNC: 15 MG/DL (ref 8–23)
CALCIUM SERPL-MCNC: 8.6 MG/DL (ref 8.8–10.4)
CHLORIDE SERPL-SCNC: 106 MMOL/L (ref 98–107)
CHOLEST SERPL-MCNC: 166 MG/DL
CREAT SERPL-MCNC: 0.9 MG/DL (ref 0.51–0.95)
EGFRCR SERPLBLD CKD-EPI 2021: 63 ML/MIN/1.73M2
EOSINOPHIL # BLD AUTO: 0 10E3/UL (ref 0–0.7)
EOSINOPHIL NFR BLD AUTO: 0 %
ERYTHROCYTE [DISTWIDTH] IN BLOOD BY AUTOMATED COUNT: 13.4 % (ref 10–15)
FASTING STATUS PATIENT QL REPORTED: NO
FASTING STATUS PATIENT QL REPORTED: NO
GLUCOSE SERPL-MCNC: 106 MG/DL (ref 70–99)
HCO3 SERPL-SCNC: 24 MMOL/L (ref 22–29)
HCT VFR BLD AUTO: 38.6 % (ref 35–47)
HDLC SERPL-MCNC: 49 MG/DL
HGB BLD-MCNC: 12 G/DL (ref 11.7–15.7)
IMM GRANULOCYTES # BLD: 0.1 10E3/UL
IMM GRANULOCYTES NFR BLD: 1 %
LDH SERPL L TO P-CCNC: 174 U/L (ref 0–250)
LDLC SERPL CALC-MCNC: 83 MG/DL
LYMPHOCYTES # BLD AUTO: 4.7 10E3/UL (ref 0.8–5.3)
LYMPHOCYTES NFR BLD AUTO: 47 %
MCH RBC QN AUTO: 31.3 PG (ref 26.5–33)
MCHC RBC AUTO-ENTMCNC: 31.1 G/DL (ref 31.5–36.5)
MCV RBC AUTO: 101 FL (ref 78–100)
MONOCYTES # BLD AUTO: 1.7 10E3/UL (ref 0–1.3)
MONOCYTES NFR BLD AUTO: 17 %
NEUTROPHILS # BLD AUTO: 3.6 10E3/UL (ref 1.6–8.3)
NEUTROPHILS NFR BLD AUTO: 36 %
NONHDLC SERPL-MCNC: 117 MG/DL
NRBC # BLD AUTO: 0 10E3/UL
NRBC BLD AUTO-RTO: 0 /100
PLAT MORPH BLD: ABNORMAL
PLATELET # BLD AUTO: 120 10E3/UL (ref 150–450)
POTASSIUM SERPL-SCNC: 3.9 MMOL/L (ref 3.4–5.3)
PROT SERPL-MCNC: 7 G/DL (ref 6.4–8.3)
RBC # BLD AUTO: 3.84 10E6/UL (ref 3.8–5.2)
RBC MORPH BLD: ABNORMAL
SODIUM SERPL-SCNC: 141 MMOL/L (ref 135–145)
TRIGL SERPL-MCNC: 171 MG/DL
VARIANT LYMPHS BLD QL SMEAR: PRESENT
WBC # BLD AUTO: 10.1 10E3/UL (ref 4–11)

## 2025-06-09 PROCEDURE — 36415 COLL VENOUS BLD VENIPUNCTURE: CPT

## 2025-06-09 PROCEDURE — 85025 COMPLETE CBC W/AUTO DIFF WBC: CPT

## 2025-06-09 PROCEDURE — 88184 FLOWCYTOMETRY/ TC 1 MARKER: CPT

## 2025-06-09 PROCEDURE — 88185 FLOWCYTOMETRY/TC ADD-ON: CPT

## 2025-06-09 PROCEDURE — 83615 LACTATE (LD) (LDH) ENZYME: CPT

## 2025-06-09 PROCEDURE — 80053 COMPREHEN METABOLIC PANEL: CPT

## 2025-06-14 NOTE — TELEPHONE ENCOUNTER
Medication Question or Refill        What medication are you calling about (include dose and sig)?:   - HYDROcodone-acetaminophen (NORCO) 5-325 MG tablet     Preferred Pharmacy:   [id number] - [place], [place] - [address] [place] [address] & [place] [address] [place] [address]  Phone: [phone] Fax: [phone]      Controlled Substance Agreement on file:   CSA -- Patient Level:     [Media Unavailable] Controlled Substance Agreement - Opioid - Scan on 3/13/2024  1:04 PM: Opioid/Opioid Plus Controlled Substance Agreement   [Media Unavailable] Controlled Substance Agreement - Opioid - Scan on 9/30/2022 12:57 PM       Who prescribed the medication?: Sanjana Escobar    Do you need a refill? Yes      Do you have any questions or concerns?  Yes: Patient is unable to  the medications due to the pharmacy's call to notify patient they are out of stock.   Patient states this is the only pharmacy that patient can go to and explains transportation barriers. Patient is frustrated and would like a call back to discuss further options on other medications patient may take that is similar.     Please call patient back when able, patient mentioned only has 2 tablets left. Thank you!      Could we send this information to you in [place] or would you prefer to receive a phone call?:   Patient would prefer a phone call   Okay to leave a detailed message?: Yes at Cell number on file:    Telephone Information:   Mobile [phone]      
Called patient about her pain medication and reviewed RN message.  Per pharmacy, [place] is not currently in stock and they are uncertain when the shipment will come in.  For now, changed to oxycodone.  Will prescribe oxycodone 2.5 mg twice daily for 1 month and take with acetaminophen 325 mg - this is almost equivalent to norco 5-325 mg twice daily.  Then return to [place] when able.    Orders Placed This Encounter   Medications    oxyCODONE (ROXICODONE) 5 MG tablet     Sig: Take 0.5 tablets (2.5 mg) by mouth 2 times daily.     Dispense:  30 tablet     Refill:  0     Sanjana Escobar MD    
Writer called [facility], spoke with pharmacist who has placed order for Norco, shipment may come in tomorrow but not certain. Tramadol does not come in 25 mg tablets. They have Tramadol 50mg tablets available. Encounter routed back to Dr Escobar for further review and intervention. Shashi ABRAHAM   
Writer called [facility], they are currently closed until 2pm for meal break. Will call again after 2pm. Shashi ABRAHAM  
Female

## 2025-06-17 ENCOUNTER — OFFICE VISIT (OUTPATIENT)
Dept: FAMILY MEDICINE | Facility: CLINIC | Age: 85
End: 2025-06-17
Payer: COMMERCIAL

## 2025-06-17 VITALS
TEMPERATURE: 98 F | WEIGHT: 288 LBS | OXYGEN SATURATION: 92 % | HEIGHT: 67 IN | BODY MASS INDEX: 45.2 KG/M2 | DIASTOLIC BLOOD PRESSURE: 84 MMHG | RESPIRATION RATE: 18 BRPM | HEART RATE: 84 BPM | SYSTOLIC BLOOD PRESSURE: 140 MMHG

## 2025-06-17 DIAGNOSIS — H60.393 INFECTIVE OTITIS EXTERNA, BILATERAL: Primary | ICD-10-CM

## 2025-06-17 DIAGNOSIS — M16.0 PRIMARY OSTEOARTHRITIS OF BOTH HIPS: ICD-10-CM

## 2025-06-17 DIAGNOSIS — M17.0 OSTEOARTHRITIS OF BOTH KNEES, UNSPECIFIED OSTEOARTHRITIS TYPE: ICD-10-CM

## 2025-06-17 DIAGNOSIS — G89.4 CHRONIC PAIN SYNDROME: ICD-10-CM

## 2025-06-17 DIAGNOSIS — R73.9 HYPERGLYCEMIA: ICD-10-CM

## 2025-06-17 DIAGNOSIS — M54.50 CHRONIC MIDLINE LOW BACK PAIN WITHOUT SCIATICA: ICD-10-CM

## 2025-06-17 DIAGNOSIS — M54.42 CHRONIC MIDLINE LOW BACK PAIN WITH LEFT-SIDED SCIATICA: ICD-10-CM

## 2025-06-17 DIAGNOSIS — G89.29 CHRONIC MIDLINE LOW BACK PAIN WITH LEFT-SIDED SCIATICA: ICD-10-CM

## 2025-06-17 DIAGNOSIS — E78.5 HYPERLIPIDEMIA LDL GOAL <130: ICD-10-CM

## 2025-06-17 DIAGNOSIS — M81.0 AGE-RELATED OSTEOPOROSIS WITHOUT CURRENT PATHOLOGICAL FRACTURE: ICD-10-CM

## 2025-06-17 DIAGNOSIS — D72.821 MONOCYTOSIS: ICD-10-CM

## 2025-06-17 DIAGNOSIS — E03.9 HYPOTHYROIDISM, UNSPECIFIED TYPE: ICD-10-CM

## 2025-06-17 DIAGNOSIS — G89.29 CHRONIC MIDLINE LOW BACK PAIN WITHOUT SCIATICA: ICD-10-CM

## 2025-06-17 PROCEDURE — G2211 COMPLEX E/M VISIT ADD ON: HCPCS | Performed by: FAMILY MEDICINE

## 2025-06-17 PROCEDURE — 99215 OFFICE O/P EST HI 40 MIN: CPT | Performed by: FAMILY MEDICINE

## 2025-06-17 PROCEDURE — 3079F DIAST BP 80-89 MM HG: CPT | Performed by: FAMILY MEDICINE

## 2025-06-17 PROCEDURE — 80306 DRUG TEST PRSMV INSTRMNT: CPT | Performed by: FAMILY MEDICINE

## 2025-06-17 PROCEDURE — 99417 PROLNG OP E/M EACH 15 MIN: CPT | Performed by: FAMILY MEDICINE

## 2025-06-17 PROCEDURE — 3077F SYST BP >= 140 MM HG: CPT | Performed by: FAMILY MEDICINE

## 2025-06-17 RX ORDER — NEOMYCIN SULFATE, POLYMYXIN B SULFATE AND HYDROCORTISONE 3.5; 10000; 1 MG/ML; [IU]/ML; MG/ML
3 SOLUTION AURICULAR (OTIC) 4 TIMES DAILY
Qty: 10 ML | OUTPATIENT
Start: 2025-06-17

## 2025-06-17 RX ORDER — PRAVASTATIN SODIUM 80 MG/1
80 TABLET ORAL DAILY
Qty: 90 TABLET | Refills: 3 | Status: SHIPPED | OUTPATIENT
Start: 2025-06-17

## 2025-06-17 RX ORDER — CLOTRIMAZOLE 1 %
CREAM (GRAM) TOPICAL 2 TIMES DAILY
Qty: 15 G | Refills: 1 | Status: SHIPPED | OUTPATIENT
Start: 2025-06-17

## 2025-06-17 RX ORDER — GABAPENTIN 300 MG/1
600 CAPSULE ORAL 2 TIMES DAILY
Qty: 120 CAPSULE | Refills: 2 | Status: SHIPPED | OUTPATIENT
Start: 2025-06-17

## 2025-06-17 RX ORDER — HYDROCODONE BITARTRATE AND ACETAMINOPHEN 5; 325 MG/1; MG/1
1 TABLET ORAL 2 TIMES DAILY
Qty: 60 TABLET | Refills: 0 | Status: SHIPPED | OUTPATIENT
Start: 2025-06-17

## 2025-06-17 RX ORDER — LEVOTHYROXINE SODIUM 75 UG/1
75 TABLET ORAL DAILY
Qty: 90 TABLET | Refills: 1 | Status: SHIPPED | OUTPATIENT
Start: 2025-06-17

## 2025-06-17 NOTE — Clinical Note
Ellie, I placed a referral for an MRI last month for this patient.  She would like to have an open MRI and so I was hoping that could get faxed to [facility].  She has not heard anything from anyone.  I gave her the phone number today but can you make sure it was faxed? Sanjana Escobar MD

## 2025-06-17 NOTE — PROGRESS NOTES
Assessment & Plan     Infective otitis externa, bilateral  - Fungal infection (alternaria species) and bacteria (s aureus) confirmed in the ear culture  - Use antifungal cream (clotrimazole 1%) externally on both ears twice a day for 2 weeks.  - Recheck to assess improvement.  - if persisting, referral to ENT    Primary osteoarthritis of both hips  Xray shows mild-moderate degenerative changes bilaterally  - Discussed conservative treatment with physical therapy, weight loss, and potential injections.  She has 2 upcoming PT appointments.  - patient would like to defer any injection until after PT.    Osteoarthritis of both knees, unspecified osteoarthritis type  - X-rays show degenerative changes with marked narrowing of medial compartment bilaterally, R>L  - Discussed conservative treatment with physical therapy, weight loss, and potential injections.  She has 2 upcoming PT appointments.  - patient would like to defer any injection until after PT.    Chronic midline low back pain without sciatica  Having significant mobility issues due to her low back pain as well as her hip and knee arthritis.  She will be seeing PT next month as well as an evaluation for motorized scooter.  Have spent the last couple visits talking about her pain control.  Eventually, we will be weaning the hydrocodone by 5 tablets/month.  We have discussed the risk of fall given her osteoporosis and medication regimen.  - HYDROcodone-acetaminophen (NORCO) 5-325 MG tablet; Take 1 tablet by mouth 2 times daily.  - gabapentin (NEURONTIN) 300 MG capsule; Take 2 capsules (600 mg) by mouth 2 times daily.  -Continue tizanidine 4 mg daily as needed  - [facility]; Future  - [facility]  - Patient was given the phone number to schedule the open MRI of the lumbar spine    Age-related osteoporosis without current pathological fracture  Osteoporosis confirmed with a T-score of -3.0 in the right hip and -2.7 in the left  hip.  Start treatment with Fosamax once a week. Recommend taking vitamin D daily.    Continue:  - vitamin D3 (CHOLECALCIFEROL) 50 mcg (2000 units) tablet; Take 1 tablet (50 mcg) by mouth daily.  - alendronate (FOSAMAX) 70 MG tablet; Take 1 tablet (70 mg) by mouth every 7 days.    Monocytosis  Seen/evaluated by oncology,  Dr. Friedell.   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  Evaluate sooner if developing wegiht loss, SOB, fatigue or bruising.      Hypothyroidism, unspecified type  Normal TSH in Aug 2024.  Recheck in 2 mo.  - levothyroxine (SYNTHROID/LEVOTHROID) 75 MCG tablet; Take 1 tablet (75 mcg) by mouth daily.    Hyperlipidemia LDL goal <130  Lipids checked 2 weeks ago with elevated TG but normal LDL  - pravastatin (PRAVACHOL) 80 MG tablet; Take 1 tablet (80 mg) by mouth daily.    Hyperglycemia  Fasting glucose was 106.  Check hgb A1c with next blood draw.    Follow-up   Return in about 4 weeks (around 7/15/2025) for Follow up.        Subjective   Zena is a 84 year old, presenting for the following health issues:  Follow Up (Test result) and Refill Request        6/17/2025     2:08 PM   Additional Questions   Roomed by Urban   Accompanied by Patrica         6/17/2025    Information    services provided? No     HPI   Zena Emmanuel, 84 years old, here today to follow up on her ear infection, labs and xrays.    Recurrent otitis externa -   Fabienne has been struggling on and off with an external ear infection since last year.  She is had treatment with Cortisporin drops with improvement of symptoms for several weeks which then recur.  At last visit, we checked both a bacterial and fungal culture.  Fungal culture was positive.  Reports her ears have been itchy and crusty. She describes a sensation as if something was crawling in her ears when the issue first started.     Chronic hip and knee pain -   Her left knee is particularly bothersome  and is described as bone-on-bone.  She also has right groin pain.   Zena has been actively losing weight and notes a recent loss of 4 pounds.    Medication refill -   She is needing refills of medications include Norco, gabapentin, levothyroxine, and pravastatin. She is taking fosamax without problem.  This was started last month for osteoporosis.    Patient Active Problem List   Diagnosis    Chronic kidney disease, stage 2 (mild)    History of cataract    Neoplasm of uncertain behavior of skin    Spinal stenosis of lumbar region    Vitamin D deficiency    Morbid obesity (H)    Continuous opioid dependence (H)    Monocytosis    Primary localized osteoarthrosis of left lower leg    Chronic midline low back pain without sciatica    Chronic pain syndrome    Hypothyroidism, unspecified type    Age-related osteoporosis without current pathological fracture     Current Outpatient Medications   Medication Sig Dispense Refill    clotrimazole (LOTRIMIN) 1 % external cream Apply topically 2 times daily. 15 g 1    gabapentin (NEURONTIN) 300 MG capsule Take 2 capsules (600 mg) by mouth 2 times daily. 120 capsule 2    HYDROcodone-acetaminophen (NORCO) 5-325 MG tablet Take 1 tablet by mouth 2 times daily. 60 tablet 0    levothyroxine (SYNTHROID/LEVOTHROID) 75 MCG tablet Take 1 tablet (75 mcg) by mouth daily. 90 tablet 1    pravastatin (PRAVACHOL) 80 MG tablet Take 1 tablet (80 mg) by mouth daily. 90 tablet 3    alendronate (FOSAMAX) 70 MG tablet Take 1 tablet (70 mg) by mouth every 7 days. 12 tablet 4    naloxone (NARCAN) 4 MG/0.1ML nasal spray Spray 1 spray (4 mg) into one nostril alternating nostrils once as needed for opioid reversal every 2-3 minutes until assistance arrives 0.2 mL 0    tiZANidine (ZANAFLEX) 4 MG tablet Take 1 tablet (4 mg) by mouth daily as needed for muscle spasms 30 tablet 1    triamcinolone (KENALOG) 0.1 % external cream Apply topically 2 times daily. 15 g 2    vitamin D3 (CHOLECALCIFEROL) 50 mcg (2000  "units) tablet Take 1 tablet (50 mcg) by mouth daily. 90 tablet 3     No current facility-administered medications for this visit.           Objective    BP (!) 140/84   Pulse 84   Temp 98  F (36.7  C)   Resp 18   Ht 1.702 m (5' 7\")   Wt 130.6 kg (288 lb)   SpO2 92%   BMI 45.11 kg/m    Body mass index is 45.11 kg/m .  Physical Exam   GENERAL: alert and no distress  HENT: both ears with edematous but patent ear canals, minimal debris.  TM intact.  Crusting present around proximal ear canal.      Lab on 06/09/2025   Component Date Value Ref Range Status    Sodium 06/09/2025 141  135 - 145 mmol/L Final    Potassium 06/09/2025 3.9  3.4 - 5.3 mmol/L Final    Carbon Dioxide (CO2) 06/09/2025 24  22 - 29 mmol/L Final    Anion Gap 06/09/2025 11  7 - 15 mmol/L Final    Urea Nitrogen 06/09/2025 15.0  8.0 - 23.0 mg/dL Final    Creatinine 06/09/2025 0.90  0.51 - 0.95 mg/dL Final    GFR Estimate 06/09/2025 63  >60 mL/min/1.73m2 Final    eGFR calculated using 2021 CKD-EPI equation.    Calcium 06/09/2025 8.6 (L)  8.8 - 10.4 mg/dL Final    Chloride 06/09/2025 106  98 - 107 mmol/L Final    Glucose 06/09/2025 106 (H)  70 - 99 mg/dL Final    Alkaline Phosphatase 06/09/2025 99  40 - 150 U/L Final    AST 06/09/2025 26  0 - 45 U/L Final    ALT 06/09/2025 20  0 - 50 U/L Final    Protein Total 06/09/2025 7.0  6.4 - 8.3 g/dL Final    Albumin 06/09/2025 3.8  3.5 - 5.2 g/dL Final    Bilirubin Total 06/09/2025 0.3  <=1.2 mg/dL Final    Patient Fasting > 8hrs? 06/09/2025 No   Final    Lactate Dehydrogenase 06/09/2025 174  0 - 250 U/L Final    Case Report 06/09/2025    Final                    Value:Flow Cytometry Report                             Case: [id number]                                  Authorizing Provider:  Friedell, Peter E, MD      Collected:           06/09/2025 10:33 AM          Ordering Location:     [facility]   Received:            06/09/2025 10:33 AM                                 [facility]"                                                     Pathologist:           Jm Montes MD                                                     Specimen:    Peripheral Blood                                                                           Flow Interpretation 06/09/2025    Final                    Value:A. 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     Prior flow cytometry studies in our institution showed abnormal partitioning of monocytes which could be seen due to various causes including chronic myelomonocytic leukemia (CMML). There is no evidence of high grade myeloid neoplasm given the rarity of circulating blasts; however, circulating neoplastic cells are not always present in leukemia; therefore, the peripheral blood findings do not rule out myeloid neoplasm.     Final interpretation requires correlation with morphologic and clinical features.      Flow Phenotypic Data 06/09/2025    Final                    Value:Unless otherwise indicated, percentages reported below are based on the total number of CD45 positive viable leukocytes. If applicable, percentage of plasma cells is from total viable nucleated cells.    20% express CD5, CD19, and CD20 (dim) and biclonal kappa and lambda immunoglobulin light chains, and lack CD10 and CD38. The B cells have similar forward scatter relative to background T cells suggestive of similar size; however, precise size determination is deferred to morphology.     0.2% CD34 positive blasts with overall unremarkable immunophenotype (given  the prior flow cytometry studies in our institution, only AML2 tube was performed).       Case was reviewed by the following:  Resident Pathologist: Pratik Snyder MD  A resident/fellow was involved in the selection of testing, review of flow scattergrams, and/or interpretation of this case.  I, as the senior physician, attest that I: (i) confirmed appropriate testing, (ii) examined the relevant flow scattergrams for the                           specimen(s); and (ii) rendered or confirmed the interpretation(s).      Flow Processing Information 06/09/2025    Final                    Value:Multi-color flow analysis is performed for the following markers: CD3, CD5, CD7, CD10, CD13, CD14, CD19, CD20, CD34, CD38, CD45, CD56, CD64, , HLA-DR and kappa and lambda immunoglobulin light chains. Cells are gated to isolate populations (CD45 versus side scatter and forward scatter versus side scatter), to exclude debris (forward scatter versus side scatter) and to exclude cell doublets (forward scatter height versus forward scatter width and side scatter height versus side scatter width). Forward scatter varies with cell size. Side scatter varies with the amount of cytoplasmic granules. Intensity for CD45 usually increases as hematolymphoid cells mature.      Clinical Information 06/09/2025    Final                    Value:84 year old female with persistent monotypic B cells increased monocytes and thrombocytopenia      FDA Disclaimer 06/09/2025    Final                    Value:This test was developed and its performance characteristics determined by the [facility]. It has not been cleared or approved by the US Food and Drug Administration.  FDA does not require this test to go through premarket FDA review. This test is used for clinical purposes and should not be regarded as investigational or for research. This laboratory is certified under the Clinical  Laboratory Improvement Amendments (CLIA) as qualified to perform high complexity clinical laboratory testing.      MCRS 06/09/2025 Yes (A)  N/A Final    Performing Labs 06/09/2025    Final                    Value:The technical component of this testing was completed at [facility].    Stain controls for all stains resulted within this report have been reviewed and show appropriate reactivity.      Cholesterol 06/09/2025 166  <200 mg/dL Final    Triglycerides 06/09/2025 171 (H)  <150 mg/dL Final    Direct Measure HDL 06/09/2025 49 (L)  >=50 mg/dL Final    LDL Cholesterol Calculated 06/09/2025 83  <100 mg/dL Final    Non HDL Cholesterol 06/09/2025 117  <130 mg/dL Final    Patient Fasting > 8hrs? 06/09/2025 No   Final    WBC Count 06/09/2025 10.1  4.0 - 11.0 10e3/uL Final    RBC Count 06/09/2025 3.84  3.80 - 5.20 10e6/uL Final    Hemoglobin 06/09/2025 12.0  11.7 - 15.7 g/dL Final    Hematocrit 06/09/2025 38.6  35.0 - 47.0 % Final    MCV 06/09/2025 101 (H)  78 - 100 fL Final    MCH 06/09/2025 31.3  26.5 - 33.0 pg Final    MCHC 06/09/2025 31.1 (L)  31.5 - 36.5 g/dL Final    RDW 06/09/2025 13.4  10.0 - 15.0 % Final    Platelet Count 06/09/2025 120 (L)  150 - 450 10e3/uL Final    % Neutrophils 06/09/2025 36  % Final    % Lymphocytes 06/09/2025 47  % Final    % Monocytes 06/09/2025 17  % Final    % Eosinophils 06/09/2025 0  % Final    % Basophils 06/09/2025 0  % Final    % Immature Granulocytes 06/09/2025 1  % Final    NRBCs per 100 WBC 06/09/2025 0  <1 /100 Final    Absolute Neutrophils 06/09/2025 3.6  1.6 - 8.3 10e3/uL Final    Absolute Lymphocytes 06/09/2025 4.7  0.8 - 5.3 10e3/uL Final    Absolute Monocytes 06/09/2025 1.7 (H)  0.0 - 1.3 10e3/uL Final    Absolute Eosinophils 06/09/2025 0.0  0.0 - 0.7 10e3/uL Final    Absolute Basophils 06/09/2025 0.0  0.0 - 0.2 10e3/uL Final    Absolute Immature Granulocytes 06/09/2025 0.1  <=0.4 10e3/uL Final    Absolute  NRBCs 06/09/2025 0.0  10e3/uL Final    RBC Morphology 06/09/2025 Confirmed RBC Indices   Final    Platelet Assessment 06/09/2025 Automated Count Confirmed. Platelet morphology is normal.  Automated Count Confirmed. Platelet morphology is normal. Final    Reactive Lymphocytes 06/09/2025 Present (A)  None Seen Final             The longitudinal plan of care for the diagnosis(es)/condition(s) as documented were addressed during this visit. Due to the added complexity in care, I will continue to support Zena in the subsequent management and with ongoing continuity of care.    58 minutes spent by me on the date of the encounter doing chart review, review of test results, interpretation of tests, patient visit, documentation, and discussion with family     Consent was obtained from the patient to use an AI documentation tool in the creation of this note      Signed Electronically by: Sanjana Escobar MD

## 2025-06-17 NOTE — PATIENT INSTRUCTIONS
Open MRI:    [facility] [place]  [address]  [place] [address]  [phone]      To schedule a medical ride:    Dr. Shawn Freitas asked us to reach out you with information for scheduling medical transportation to and from medical appointments. You are able to call [employer] through [employer] at [phone] and provide your member ID [id number] with the date, time and address of the appointment. Let us know if you need any further assistance or information. Thanks!        Jadiel Pagan, ISIDRO, [facility]   -[facility]  [phone] (direct)  [phone] (clinic)

## 2025-06-18 LAB — BACTERIA SPEC CULT: ABNORMAL

## 2025-06-19 ENCOUNTER — PATIENT OUTREACH (OUTPATIENT)
Dept: CARE COORDINATION | Facility: CLINIC | Age: 85
End: 2025-06-19

## 2025-06-19 ENCOUNTER — ONCOLOGY VISIT (OUTPATIENT)
Dept: ONCOLOGY | Facility: HOSPITAL | Age: 85
End: 2025-06-19
Payer: COMMERCIAL

## 2025-06-19 ENCOUNTER — PATIENT OUTREACH (OUTPATIENT)
Dept: ONCOLOGY | Facility: HOSPITAL | Age: 85
End: 2025-06-19

## 2025-06-19 VITALS
TEMPERATURE: 97.9 F | HEART RATE: 65 BPM | BODY MASS INDEX: 43.35 KG/M2 | DIASTOLIC BLOOD PRESSURE: 67 MMHG | WEIGHT: 286 LBS | OXYGEN SATURATION: 94 % | RESPIRATION RATE: 16 BRPM | SYSTOLIC BLOOD PRESSURE: 143 MMHG | HEIGHT: 68 IN

## 2025-06-19 DIAGNOSIS — D72.821 MONOCYTOSIS: Primary | ICD-10-CM

## 2025-06-19 PROCEDURE — G0463 HOSPITAL OUTPT CLINIC VISIT: HCPCS | Performed by: INTERNAL MEDICINE

## 2025-06-19 ASSESSMENT — PAIN SCALES - GENERAL: PAINLEVEL_OUTOF10: NO PAIN (0)

## 2025-06-19 NOTE — PROGRESS NOTES
"Oncology Rooming Note    June 19, 2025 11:28 AM   Zena Emmanuel is a 84 year old female who presents for:    Chief Complaint   Patient presents with    Oncology Clinic Visit     Follow up 4 month labs      Initial Vitals: There were no vitals taken for this visit. Estimated body mass index is 45.11 kg/m  as calculated from the following:    Height as of 6/17/25: 1.702 m (5' 7\").    Weight as of 6/17/25: 130.6 kg (288 lb). There is no height or weight on file to calculate BSA.  Data Unavailable Comment: Data Unavailable   No LMP recorded. Patient has had a hysterectomy.  Allergies reviewed: Yes  Medications reviewed: Yes    Medications: Medication refills not needed today.  Pharmacy name entered into [employer]: [id number] - [place], [place] - [address] [place] [address] AT [facility]    Frailty Screening:   Is the patient here for a new oncology consult visit in cancer care? 2. No    PHQ9:  Did this patient require a PHQ9?: No      Clinical concerns: none       Pavithra Jolley CMA            "

## 2025-06-19 NOTE — PROGRESS NOTES
[facility]: Cancer Care                                                                                          Situation: Chart reviewed by RN Care Coordinator.    Background: Patient was seen in clinic today for follow-up regarding thrombocytopenia.    Assessment: No evidence of disease progression.    Plan/Recommendations: Patient is to return to clinic in 6 months with labs.      Signature:  Faviola Greer  RN Care Coordinator  [facility]

## 2025-06-19 NOTE — PROGRESS NOTES
Social Work - [email or web address] message  [facility]  Data:   Patient Name: Zena Emmanuel  Goes By: Zena FERNANDES/Age: 1940 (84 year old)      Referral Source: Dr. Friedel     Reason for Referral: Housing resources     Intervention: SW left  for pt with reason for call and contact information. Also sent message to primary care SW, Jadiel Pagan for possible follow up.   Plan:  will await patient's return phone call/message and provide assistance at that time.   JANNY Noe, [facility]  Adult Oncology Clinics  [place] (M,W), [place] (T) & [place] (Th)  *I am off Friday  Office: [phone]

## 2025-06-19 NOTE — PROGRESS NOTES
[facility] Progress Note    Patient: Zena Emmanuel  MRN: [id number]  Date of Service: Jun 19, 2025       Reason for Visit    I was consulted by   Sanjana Escobar MD   Follow up for  persistent elevation of monocytes and mild thrombocytopenia.      Encounter Diagnoses Assessment and Plan:    Problem List Items Addressed This Visit       Monocytosis - Primary    Relevant Orders    Social Work Referral Specific Sites Only - See Locations in Order    CBC with Platelets & Differential    Comprehensive metabolic panel   Patient with persistent monocytosis documented back to 2022 when patient moved to [place] from [place].  Patient notes that her white blood cell count is going up and down for many years.  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      Update 11/21/2024.  Patient returns for follow-up.  She remains asymptomatic.  Flow cytometry in addition to monocytosis consistent with CMML also shows a clone of monoclonal lymphocytes compatible with CLL/SLL.      Update 6/19/2025  I advised the patient that there was no evidence of disease progression.  I would continue to monitor her disease with evaluation about every 6 months.  I advised she should return earlier if she develops symptoms such as weight loss shortness of breath, fatigue or bruising.  CMP and CBC  with differential have been ordered for that visit.           ______________________________________________________________________________      ECOG Performance  1 - Can't do physically strenuous work, but fully ambulatory and can do light sedentary work.    History of Present Illness    Ms. Zena Emmanuel is an 84year old woman who has generally been in good health.  For the last 2 years since moving to [place] she ihas had an elevated monocyte count with absolute monocytes in the 2000 range forming about 20% of the white cell differential.  More recently  "she has had mild thrombocytopenia with platelet counts in the 140,000 range.  She has not had chills, fevers or sweats.  She has not had unexplained weight loss.  She does have occasional night sweats but these are not soaking.  She does note mild dyspnea on exertion.  She is a former smoker.  She does not use alcohol to excess.    Review of systems.  Pertinent Findings are included in the History of Present Illness    Physical Exam    BP (!) 143/67 (BP Location: Left arm, Patient Position: Sitting, Cuff Size: Adult Large)   Pulse 65   Temp 97.9  F (36.6  C) (Oral)   Resp 16   Ht 1.715 m (5' 7.5\")   Wt 129.7 kg (286 lb)   SpO2 94%   BMI 44.13 kg/m       GENERAL APPEARANCE: 84-year-old woman in no apparent distress.  HEAD: Atraumatic; normocephalic; without lesions.  EYES: Conjunctiva, corneas and eyelids normal; pupils equal, round, reactive to light; No Icterus.  MOUTH/OROPHARYNX: Oral mucosa intact  NECK: Supple with no nodes.  LUNGS:  Clear to auscultation and percussion with no extra sounds.  HEART: Regular rhythm and rate; S1 and S2 normal; no murmurs noted.  ABDOMEN: Soft; no masses or tenderness, no hepatosplenomegaly.  Body habitus limits exam  NEUROLOGIC: Alert and oriented.  No obvious focal findings.  EXTREMITIES: No cyanosis, or edema.  SKIN: No abnormal bruising or bleeding. No suspicious lesions noted on exposed skin.  PSYCHIATRIC: Mental status normal; no apparent psychiatric issues  No cervical or axillary lymphadenopathy noted    Medications:    Current Outpatient Medications   Medication Sig Dispense Refill    alendronate (FOSAMAX) 70 MG tablet Take 1 tablet (70 mg) by mouth every 7 days. 12 tablet 4    gabapentin (NEURONTIN) 300 MG capsule Take 2 capsules (600 mg) by mouth 2 times daily. 120 capsule 2    HYDROcodone-acetaminophen (NORCO) 5-325 MG tablet Take 1 tablet by mouth 2 times daily. 60 tablet 0    levothyroxine (SYNTHROID/LEVOTHROID) 75 MCG tablet Take 1 tablet (75 mcg) by mouth " "daily. 90 tablet 1    naloxone (NARCAN) 4 MG/0.1ML nasal spray Spray 1 spray (4 mg) into one nostril alternating nostrils once as needed for opioid reversal every 2-3 minutes until assistance arrives 0.2 mL 0    pravastatin (PRAVACHOL) 80 MG tablet Take 1 tablet (80 mg) by mouth daily. 90 tablet 3    tiZANidine (ZANAFLEX) 4 MG tablet Take 1 tablet (4 mg) by mouth daily as needed for muscle spasms 30 tablet 1    vitamin D3 (CHOLECALCIFEROL) 50 mcg (2000 units) tablet Take 1 tablet (50 mcg) by mouth daily. 90 tablet 3    clotrimazole (LOTRIMIN) 1 % external cream Apply topically 2 times daily. (Patient not taking: Reported on 2025) 15 g 1    triamcinolone (KENALOG) 0.1 % external cream Apply topically 2 times daily. (Patient not taking: Reported on 2025) 15 g 2     No current facility-administered medications for this visit.           Past History    Past Medical History:   Diagnosis Date    Anemia     Simple chronic bronchitis (H)      Past Surgical History:   Procedure Laterality Date    Cancer complete hysterectomy      CATARACT EXTRACTION Bilateral     KNEE SURGERY Left      Allergies   Allergen Reactions    Bees Hives    Penicillins      Other reaction(s): \"Local Swelling\"     Family History   Problem Relation Age of Onset    Heart Disease Father     Heart Disease Maternal Grandmother     Heart Disease Maternal Grandfather     Heart Disease Paternal Grandmother     Heart Disease Paternal Grandfather     Hypertension Sister     Cerebrovascular Disease Sister     Hypertension Sister      Social History     Socioeconomic History    Marital status:      Spouse name: None    Number of children: None    Years of education: None    Highest education level: None   Tobacco Use    Smoking status: Former     Current packs/day: 0.00     Average packs/day: 1 pack/day for 55.0 years (55.0 ttl pk-yrs)     Types: Cigarettes     Start date: 1956     Quit date: 8/3/2010     Years since quittin.0     " Passive exposure: Past    Smokeless tobacco: Never   Substance and Sexual Activity    Alcohol use: Not Currently    Drug use: Never    Sexual activity: Not Currently     Social Determinants of Health     Financial Resource Strain: Low Risk  (9/29/2023)    Financial Resource Strain     Within the past 12 months, have you or your family members you live with been unable to get utilities (heat, electricity) when it was really needed?: No   Food Insecurity: Low Risk  (9/29/2023)    Food Insecurity     Within the past 12 months, did you worry that your food would run out before you got money to buy more?: No     Within the past 12 months, did the food you bought just not last and you didn t have money to get more?: No   Transportation Needs: Unknown (9/29/2023)    Transportation Needs     Within the past 12 months, has lack of transportation kept you from medical appointments, getting your medicines, non-medical meetings or appointments, work, or from getting things that you need?: Patient refused   Interpersonal Safety: Low Risk  (7/2/2024)    Interpersonal Safety     Do you feel physically and emotionally safe where you currently live?: Yes     Within the past 12 months, have you been hit, slapped, kicked or otherwise physically hurt by someone?: No     Within the past 12 months, have you been humiliated or emotionally abused in other ways by your partner or ex-partner?: No   Housing Stability: Low Risk  (9/29/2023)    Housing Stability     Do you have housing? : Yes     Are you worried about losing your housing?: No           Lab Results    Recent Results (from the past 720 hours)   Fungus Culture, non-blood    Collection Time: 05/21/25 11:24 AM    Specimen: Ear, Left; Swab   Result Value Ref Range    Culture Alternaria species (A)    Respiratory Aerobic Bacterial Culture    Collection Time: 05/21/25 11:24 AM    Specimen: Ear, Left; Swab   Result Value Ref Range    Culture 4+ Staphylococcus epidermidis (A)     Culture  2+ Staphylococcus aureus (A)        Susceptibility    Staphylococcus aureus - BRIANDA     Oxacillin* 0.5 Susceptible ug/mL      * Oxacillin susceptible isolates are susceptible to cephalosporins (example: cefazolin and cephalexin) and beta lactam combination agents. Oxacillin resistant isolates are resistant to these agents.     Gentamicin 1 Susceptible ug/mL     Erythromycin <=0.25 Susceptible ug/mL     Clindamycin <=0.12 Susceptible ug/mL     Vancomycin <=0.5 Susceptible ug/mL     Tetracycline <=1 Susceptible ug/mL     Doxycycline <=0.5 Susceptible ug/mL     Trimethoprim/Sulfamethoxazole <=0.5/9.5 Susceptible ug/mL   Comprehensive metabolic panel    Collection Time: 06/09/25 10:33 AM   Result Value Ref Range    Sodium 141 135 - 145 mmol/L    Potassium 3.9 3.4 - 5.3 mmol/L    Carbon Dioxide (CO2) 24 22 - 29 mmol/L    Anion Gap 11 7 - 15 mmol/L    Urea Nitrogen 15.0 8.0 - 23.0 mg/dL    Creatinine 0.90 0.51 - 0.95 mg/dL    GFR Estimate 63 >60 mL/min/1.73m2    Calcium 8.6 (L) 8.8 - 10.4 mg/dL    Chloride 106 98 - 107 mmol/L    Glucose 106 (H) 70 - 99 mg/dL    Alkaline Phosphatase 99 40 - 150 U/L    AST 26 0 - 45 U/L    ALT 20 0 - 50 U/L    Protein Total 7.0 6.4 - 8.3 g/dL    Albumin 3.8 3.5 - 5.2 g/dL    Bilirubin Total 0.3 <=1.2 mg/dL    Patient Fasting > 8hrs? No    Lactate Dehydrogenase    Collection Time: 06/09/25 10:33 AM   Result Value Ref Range    Lactate Dehydrogenase 174 0 - 250 U/L   Flow Cytometry    Collection Time: 06/09/25 10:33 AM    Specimen: Peripheral Blood   Result Value Ref Range    Case Report       Flow Cytometry Report                             Case: [id number]                                  Authorizing Provider:  Friedell, Peter E, MD      Collected:           06/09/2025 10:33 AM          Ordering Location:     [facility]   Received:            06/09/2025 10:33 AM                                 [facility]                                                              Pathologist:           Jm Montes MD                                                     Specimen:    Peripheral Blood                                                                           Flow Interpretation       A. 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     Prior flow cytometry studies in our institution showed abnormal partitioning of monocytes which could be seen due to various causes including chronic myelomonocytic leukemia (CMML). There is no evidence of high grade myeloid neoplasm given the rarity of circulating blasts; however, circulating neoplastic cells are not always present in leukemia; therefore, the peripheral blood findings do not rule out myeloid neoplasm.     Final interpretation requires correlation with morphologic and clinical features.      Flow Phenotypic Data       Unless otherwise indicated, percentages reported below are based on the total number of CD45 positive viable leukocytes. If applicable, percentage of plasma cells is from total viable nucleated cells.    20% express CD5, CD19, and CD20 (dim) and biclonal kappa and lambda immunoglobulin light chains, and lack CD10 and CD38. The B cells have similar forward scatter relative to background T cells suggestive of similar size; however, precise size determination is deferred to morphology.     0.2% CD34 positive blasts with overall unremarkable immunophenotype (given the prior flow cytometry studies in our institution, only AML2 tube was performed).       Case was reviewed by the following:  Resident Pathologist: Pratik Snyder,  MD  A resident/fellow was involved in the selection of testing, review of flow scattergrams, and/or interpretation of this case.  I, as the senior physician, attest that I: (i) confirmed appropriate testing, (ii) examined the relevant flow scattergrams for the specimen(s); and (ii) rendered or confirmed the interpretation(s).      Flow Processing Information       Multi-color flow analysis is performed for the following markers: CD3, CD5, CD7, CD10, CD13, CD14, CD19, CD20, CD34, CD38, CD45, CD56, CD64, , HLA-DR and kappa and lambda immunoglobulin light chains. Cells are gated to isolate populations (CD45 versus side scatter and forward scatter versus side scatter), to exclude debris (forward scatter versus side scatter) and to exclude cell doublets (forward scatter height versus forward scatter width and side scatter height versus side scatter width). Forward scatter varies with cell size. Side scatter varies with the amount of cytoplasmic granules. Intensity for CD45 usually increases as hematolymphoid cells mature.      Clinical Information       84 year old female with persistent monotypic B cells increased monocytes and thrombocytopenia      FDA Disclaimer       This test was developed and its performance characteristics determined by the [facility]. It has not been cleared or approved by the US Food and Drug Administration.  FDA does not require this test to go through premarket FDA review. This test is used for clinical purposes and should not be regarded as investigational or for research. This laboratory is certified under the Clinical Laboratory Improvement Amendments (CLIA) as qualified to perform high complexity clinical laboratory testing.      MCRS Yes (A) N/A    Performing Labs       The technical component of this testing was completed at [facility].    Stain controls for all  stains resulted within this report have been reviewed and show appropriate reactivity.     Lipid Profile    Collection Time: 06/09/25 10:33 AM   Result Value Ref Range    Cholesterol 166 <200 mg/dL    Triglycerides 171 (H) <150 mg/dL    Direct Measure HDL 49 (L) >=50 mg/dL    LDL Cholesterol Calculated 83 <100 mg/dL    Non HDL Cholesterol 117 <130 mg/dL    Patient Fasting > 8hrs? No    CBC with platelets and differential    Collection Time: 06/09/25 10:33 AM   Result Value Ref Range    WBC Count 10.1 4.0 - 11.0 10e3/uL    RBC Count 3.84 3.80 - 5.20 10e6/uL    Hemoglobin 12.0 11.7 - 15.7 g/dL    Hematocrit 38.6 35.0 - 47.0 %     (H) 78 - 100 fL    MCH 31.3 26.5 - 33.0 pg    MCHC 31.1 (L) 31.5 - 36.5 g/dL    RDW 13.4 10.0 - 15.0 %    Platelet Count 120 (L) 150 - 450 10e3/uL    % Neutrophils 36 %    % Lymphocytes 47 %    % Monocytes 17 %    % Eosinophils 0 %    % Basophils 0 %    % Immature Granulocytes 1 %    NRBCs per 100 WBC 0 <1 /100    Absolute Neutrophils 3.6 1.6 - 8.3 10e3/uL    Absolute Lymphocytes 4.7 0.8 - 5.3 10e3/uL    Absolute Monocytes 1.7 (H) 0.0 - 1.3 10e3/uL    Absolute Eosinophils 0.0 0.0 - 0.7 10e3/uL    Absolute Basophils 0.0 0.0 - 0.2 10e3/uL    Absolute Immature Granulocytes 0.1 <=0.4 10e3/uL    Absolute NRBCs 0.0 10e3/uL   RBC and Platelet Morphology    Collection Time: 06/09/25 10:33 AM   Result Value Ref Range    RBC Morphology Confirmed RBC Indices     Platelet Assessment  Automated Count Confirmed. Platelet morphology is normal.     Automated Count Confirmed. Platelet morphology is normal.    Reactive Lymphocytes Present (A) None Seen   Urine Drug Screen Clinic    Collection Time: 06/17/25  2:42 PM   Result Value Ref Range    Cannabinoids (27-jdn-5-carboxy-9-THC) Not Detected Not Detected, Indeterminate    Phencyclidine Not Detected Not Detected, Indeterminate    Cocaine (Benzoylecgonine) Not Detected Not Detected, Indeterminate    Methamphetamine (d-Methamphetamine) Not Detected Not  Detected, Indeterminate    Opiates (Morphine) Detected (A) Not Detected, Indeterminate    Amphetamine (d-Amphetamine) Not Detected Not Detected, Indeterminate    Benzodiazepines (Nordiazepam) Not Detected Not Detected, Indeterminate    Tricyclic Antidepressants (Desipramine) Not Detected Not Detected, Indeterminate    Methadone Not Detected Not Detected, Indeterminate    Barbiturates (Butalbital) Not Detected Not Detected, Indeterminate    Oxycodone Not Detected Not Detected, Indeterminate    Buprenorphine Not Detected Not Detected, Indeterminate         I spent 29 minutes on the patient's visit today.  This included preparation for the visit, face-to-face time with the patient and documentation following the visit.  It did not include teaching or procedure time.    Signed by: Peter E. Friedell, MD

## 2025-06-19 NOTE — LETTER
"6/19/2025      Zena Emmanuel  [address]  [place] [address]      Dear Colleague,    Thank you for referring your patient, Zena Emmanuel, to the [facility]. Please see a copy of my visit note below.    Oncology Rooming Note    June 19, 2025 11:28 AM   Zena Emmanuel is a 84 year old female who presents for:    Chief Complaint   Patient presents with     Oncology Clinic Visit     Follow up 4 month labs      Initial Vitals: There were no vitals taken for this visit. Estimated body mass index is 45.11 kg/m  as calculated from the following:    Height as of 6/17/25: 1.702 m (5' 7\").    Weight as of 6/17/25: 130.6 kg (288 lb). There is no height or weight on file to calculate BSA.  Data Unavailable Comment: Data Unavailable   No LMP recorded. Patient has had a hysterectomy.  Allergies reviewed: Yes  Medications reviewed: Yes    Medications: Medication refills not needed today.  Pharmacy name entered into Rayn: [id number] - [place], [place] - [address]    Frailty Screening:   Is the patient here for a new oncology consult visit in cancer care? 2. No    PHQ9:  Did this patient require a PHQ9?: No      Clinical concerns: none       Pavithra Jolley [facility] Hematology and Oncology Progress Note    Patient: Zena Emmanuel  MRN: [id number]  Date of Service: Jun 19, 2025       Reason for Visit    I was consulted by   Sanjana Escobar MD   Follow up for  persistent elevation of monocytes and mild thrombocytopenia.      Encounter Diagnoses Assessment and Plan:    Problem List Items Addressed This Visit       Monocytosis - Primary    Relevant Orders    Social Work Referral Specific Sites Only - See Locations in Order    CBC with Platelets & Differential    Comprehensive metabolic panel   Patient with persistent monocytosis documented back to 2022 when patient moved to [place] from [place].  " "Patient notes that her white blood cell count is going up and down for many years.  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      Update 11/21/2024.  Patient returns for follow-up.  She remains asymptomatic.  Flow cytometry in addition to monocytosis consistent with CMML also shows a clone of monoclonal lymphocytes compatible with CLL/SLL.      Update 6/19/2025  I advised the patient that there was no evidence of disease progression.  I would continue to monitor her disease with evaluation about every 6 months.  I advised she should return earlier if she develops symptoms such as weight loss shortness of breath, fatigue or bruising.  CMP and CBC  with differential have been ordered for that visit.           ______________________________________________________________________________      ECOG Performance  1 - Can't do physically strenuous work, but fully ambulatory and can do light sedentary work.    History of Present Illness    Ms. Zena Emmanuel is an 84year old woman who has generally been in good health.  For the last 2 years since moving to [place] she ihas had an elevated monocyte count with absolute monocytes in the 2000 range forming about 20% of the white cell differential.  More recently she has had mild thrombocytopenia with platelet counts in the 140,000 range.  She has not had chills, fevers or sweats.  She has not had unexplained weight loss.  She does have occasional night sweats but these are not soaking.  She does note mild dyspnea on exertion.  She is a former smoker.  She does not use alcohol to excess.    Review of systems.  Pertinent Findings are included in the History of Present Illness    Physical Exam    BP (!) 143/67 (BP Location: Left arm, Patient Position: Sitting, Cuff Size: Adult Large)   Pulse 65   Temp 97.9  F (36.6  C) (Oral)   Resp 16   Ht 1.715 m (5' 7.5\")   Wt 129.7 kg (286 lb)   SpO2 94%   BMI 44.13 kg/m   "     GENERAL APPEARANCE: 84-year-old woman in no apparent distress.  HEAD: Atraumatic; normocephalic; without lesions.  EYES: Conjunctiva, corneas and eyelids normal; pupils equal, round, reactive to light; No Icterus.  MOUTH/OROPHARYNX: Oral mucosa intact  NECK: Supple with no nodes.  LUNGS:  Clear to auscultation and percussion with no extra sounds.  HEART: Regular rhythm and rate; S1 and S2 normal; no murmurs noted.  ABDOMEN: Soft; no masses or tenderness, no hepatosplenomegaly.  Body habitus limits exam  NEUROLOGIC: Alert and oriented.  No obvious focal findings.  EXTREMITIES: No cyanosis, or edema.  SKIN: No abnormal bruising or bleeding. No suspicious lesions noted on exposed skin.  PSYCHIATRIC: Mental status normal; no apparent psychiatric issues  No cervical or axillary lymphadenopathy noted    Medications:    Current Outpatient Medications   Medication Sig Dispense Refill     alendronate (FOSAMAX) 70 MG tablet Take 1 tablet (70 mg) by mouth every 7 days. 12 tablet 4     gabapentin (NEURONTIN) 300 MG capsule Take 2 capsules (600 mg) by mouth 2 times daily. 120 capsule 2     HYDROcodone-acetaminophen (NORCO) 5-325 MG tablet Take 1 tablet by mouth 2 times daily. 60 tablet 0     levothyroxine (SYNTHROID/LEVOTHROID) 75 MCG tablet Take 1 tablet (75 mcg) by mouth daily. 90 tablet 1     naloxone (NARCAN) 4 MG/0.1ML nasal spray Spray 1 spray (4 mg) into one nostril alternating nostrils once as needed for opioid reversal every 2-3 minutes until assistance arrives 0.2 mL 0     pravastatin (PRAVACHOL) 80 MG tablet Take 1 tablet (80 mg) by mouth daily. 90 tablet 3     tiZANidine (ZANAFLEX) 4 MG tablet Take 1 tablet (4 mg) by mouth daily as needed for muscle spasms 30 tablet 1     vitamin D3 (CHOLECALCIFEROL) 50 mcg (2000 units) tablet Take 1 tablet (50 mcg) by mouth daily. 90 tablet 3     clotrimazole (LOTRIMIN) 1 % external cream Apply topically 2 times daily. (Patient not taking: Reported on 6/19/2025) 15 g 1      "triamcinolone (KENALOG) 0.1 % external cream Apply topically 2 times daily. (Patient not taking: Reported on 2025) 15 g 2     No current facility-administered medications for this visit.           Past History    Past Medical History:   Diagnosis Date     Anemia      Simple chronic bronchitis (H)      Past Surgical History:   Procedure Laterality Date     Cancer complete hysterectomy       CATARACT EXTRACTION Bilateral      KNEE SURGERY Left      Allergies   Allergen Reactions     Bees Hives     Penicillins      Other reaction(s): \"Local Swelling\"     Family History   Problem Relation Age of Onset     Heart Disease Father      Heart Disease Maternal Grandmother      Heart Disease Maternal Grandfather      Heart Disease Paternal Grandmother      Heart Disease Paternal Grandfather      Hypertension Sister      Cerebrovascular Disease Sister      Hypertension Sister      Social History     Socioeconomic History     Marital status:      Spouse name: None     Number of children: None     Years of education: None     Highest education level: None   Tobacco Use     Smoking status: Former     Current packs/day: 0.00     Average packs/day: 1 pack/day for 55.0 years (55.0 ttl pk-yrs)     Types: Cigarettes     Start date: 1956     Quit date: 8/3/2010     Years since quittin.0     Passive exposure: Past     Smokeless tobacco: Never   Substance and Sexual Activity     Alcohol use: Not Currently     Drug use: Never     Sexual activity: Not Currently     Social Determinants of Health     Financial Resource Strain: Low Risk  (2023)    Financial Resource Strain      Within the past 12 months, have you or your family members you live with been unable to get utilities (heat, electricity) when it was really needed?: No   Food Insecurity: Low Risk  (2023)    Food Insecurity      Within the past 12 months, did you worry that your food would run out before you got money to buy more?: No      Within the past " 12 months, did the food you bought just not last and you didn t have money to get more?: No   Transportation Needs: Unknown (9/29/2023)    Transportation Needs      Within the past 12 months, has lack of transportation kept you from medical appointments, getting your medicines, non-medical meetings or appointments, work, or from getting things that you need?: Patient refused   Interpersonal Safety: Low Risk  (7/2/2024)    Interpersonal Safety      Do you feel physically and emotionally safe where you currently live?: Yes      Within the past 12 months, have you been hit, slapped, kicked or otherwise physically hurt by someone?: No      Within the past 12 months, have you been humiliated or emotionally abused in other ways by your partner or ex-partner?: No   Housing Stability: Low Risk  (9/29/2023)    Housing Stability      Do you have housing? : Yes      Are you worried about losing your housing?: No           Lab Results    Recent Results (from the past 720 hours)   Fungus Culture, non-blood    Collection Time: 05/21/25 11:24 AM    Specimen: Ear, Left; Swab   Result Value Ref Range    Culture Alternaria species (A)    Respiratory Aerobic Bacterial Culture    Collection Time: 05/21/25 11:24 AM    Specimen: Ear, Left; Swab   Result Value Ref Range    Culture 4+ Staphylococcus epidermidis (A)     Culture 2+ Staphylococcus aureus (A)        Susceptibility    Staphylococcus aureus - BRIANDA     Oxacillin* 0.5 Susceptible ug/mL      * Oxacillin susceptible isolates are susceptible to cephalosporins (example: cefazolin and cephalexin) and beta lactam combination agents. Oxacillin resistant isolates are resistant to these agents.     Gentamicin 1 Susceptible ug/mL     Erythromycin <=0.25 Susceptible ug/mL     Clindamycin <=0.12 Susceptible ug/mL     Vancomycin <=0.5 Susceptible ug/mL     Tetracycline <=1 Susceptible ug/mL     Doxycycline <=0.5 Susceptible ug/mL     Trimethoprim/Sulfamethoxazole <=0.5/9.5 Susceptible ug/mL    Comprehensive metabolic panel    Collection Time: 06/09/25 10:33 AM   Result Value Ref Range    Sodium 141 135 - 145 mmol/L    Potassium 3.9 3.4 - 5.3 mmol/L    Carbon Dioxide (CO2) 24 22 - 29 mmol/L    Anion Gap 11 7 - 15 mmol/L    Urea Nitrogen 15.0 8.0 - 23.0 mg/dL    Creatinine 0.90 0.51 - 0.95 mg/dL    GFR Estimate 63 >60 mL/min/1.73m2    Calcium 8.6 (L) 8.8 - 10.4 mg/dL    Chloride 106 98 - 107 mmol/L    Glucose 106 (H) 70 - 99 mg/dL    Alkaline Phosphatase 99 40 - 150 U/L    AST 26 0 - 45 U/L    ALT 20 0 - 50 U/L    Protein Total 7.0 6.4 - 8.3 g/dL    Albumin 3.8 3.5 - 5.2 g/dL    Bilirubin Total 0.3 <=1.2 mg/dL    Patient Fasting > 8hrs? No    Lactate Dehydrogenase    Collection Time: 06/09/25 10:33 AM   Result Value Ref Range    Lactate Dehydrogenase 174 0 - 250 U/L   Flow Cytometry    Collection Time: 06/09/25 10:33 AM    Specimen: Peripheral Blood   Result Value Ref Range    Case Report       Flow Cytometry Report                             Case: [id number]                                  Authorizing Provider:  Friedell, Peter E, MD      Collected:           06/09/2025 10:33 AM          Ordering Location:     [facility]   Received:            06/09/2025 10:33 AM                                 [facility]                                                             Pathologist:           Jm Montes MD                                                     Specimen:    Peripheral Blood                                                                           Flow Interpretation       A. 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     Prior flow cytometry studies in our institution showed abnormal partitioning of monocytes which could be seen due to various causes including chronic myelomonocytic leukemia (CMML). There is no evidence of high grade myeloid neoplasm given the rarity of circulating blasts; however, circulating neoplastic cells are not always present in leukemia; therefore, the peripheral blood findings do not rule out myeloid neoplasm.     Final interpretation requires correlation with morphologic and clinical features.      Flow Phenotypic Data       Unless otherwise indicated, percentages reported below are based on the total number of CD45 positive viable leukocytes. If applicable, percentage of plasma cells is from total viable nucleated cells.    20% express CD5, CD19, and CD20 (dim) and biclonal kappa and lambda immunoglobulin light chains, and lack CD10 and CD38. The B cells have similar forward scatter relative to background T cells suggestive of similar size; however, precise size determination is deferred to morphology.     0.2% CD34 positive blasts with overall unremarkable immunophenotype (given the prior flow cytometry studies in our institution, only AML2 tube was performed).       Case was reviewed by the following:  Resident Pathologist: Pratik Snyder MD  A resident/fellow was involved in the selection of testing, review of flow scattergrams, and/or interpretation of this case.  I, as the senior physician, attest that I: (i) confirmed appropriate testing, (ii) examined the relevant flow scattergrams for the specimen(s); and (ii) rendered or confirmed the interpretation(s).      Flow Processing Information       Multi-color flow analysis is performed for the following markers: CD3, CD5, CD7, CD10, CD13, CD14, CD19, CD20, CD34, CD38, CD45, CD56, CD64, , HLA-DR and kappa and lambda immunoglobulin light chains. Cells are gated to isolate populations (CD45 versus side  scatter and forward scatter versus side scatter), to exclude debris (forward scatter versus side scatter) and to exclude cell doublets (forward scatter height versus forward scatter width and side scatter height versus side scatter width). Forward scatter varies with cell size. Side scatter varies with the amount of cytoplasmic granules. Intensity for CD45 usually increases as hematolymphoid cells mature.      Clinical Information       84 year old female with persistent monotypic B cells increased monocytes and thrombocytopenia      FDA Disclaimer       This test was developed and its performance characteristics determined by the [facility]. It has not been cleared or approved by the US Food and Drug Administration.  FDA does not require this test to go through premarket FDA review. This test is used for clinical purposes and should not be regarded as investigational or for research. This laboratory is certified under the Clinical Laboratory Improvement Amendments (CLIA) as qualified to perform high complexity clinical laboratory testing.      MCRS Yes (A) N/A    Performing Labs       The technical component of this testing was completed at [facility].    Stain controls for all stains resulted within this report have been reviewed and show appropriate reactivity.     Lipid Profile    Collection Time: 06/09/25 10:33 AM   Result Value Ref Range    Cholesterol 166 <200 mg/dL    Triglycerides 171 (H) <150 mg/dL    Direct Measure HDL 49 (L) >=50 mg/dL    LDL Cholesterol Calculated 83 <100 mg/dL    Non HDL Cholesterol 117 <130 mg/dL    Patient Fasting > 8hrs? No    CBC with platelets and differential    Collection Time: 06/09/25 10:33 AM   Result Value Ref Range    WBC Count 10.1 4.0 - 11.0 10e3/uL    RBC Count 3.84 3.80 - 5.20 10e6/uL    Hemoglobin 12.0 11.7 - 15.7 g/dL    Hematocrit 38.6 35.0 - 47.0  %     (H) 78 - 100 fL    MCH 31.3 26.5 - 33.0 pg    MCHC 31.1 (L) 31.5 - 36.5 g/dL    RDW 13.4 10.0 - 15.0 %    Platelet Count 120 (L) 150 - 450 10e3/uL    % Neutrophils 36 %    % Lymphocytes 47 %    % Monocytes 17 %    % Eosinophils 0 %    % Basophils 0 %    % Immature Granulocytes 1 %    NRBCs per 100 WBC 0 <1 /100    Absolute Neutrophils 3.6 1.6 - 8.3 10e3/uL    Absolute Lymphocytes 4.7 0.8 - 5.3 10e3/uL    Absolute Monocytes 1.7 (H) 0.0 - 1.3 10e3/uL    Absolute Eosinophils 0.0 0.0 - 0.7 10e3/uL    Absolute Basophils 0.0 0.0 - 0.2 10e3/uL    Absolute Immature Granulocytes 0.1 <=0.4 10e3/uL    Absolute NRBCs 0.0 10e3/uL   RBC and Platelet Morphology    Collection Time: 06/09/25 10:33 AM   Result Value Ref Range    RBC Morphology Confirmed RBC Indices     Platelet Assessment  Automated Count Confirmed. Platelet morphology is normal.     Automated Count Confirmed. Platelet morphology is normal.    Reactive Lymphocytes Present (A) None Seen   Urine Drug Screen Clinic    Collection Time: 06/17/25  2:42 PM   Result Value Ref Range    Cannabinoids (56-shq-4-carboxy-9-THC) Not Detected Not Detected, Indeterminate    Phencyclidine Not Detected Not Detected, Indeterminate    Cocaine (Benzoylecgonine) Not Detected Not Detected, Indeterminate    Methamphetamine (d-Methamphetamine) Not Detected Not Detected, Indeterminate    Opiates (Morphine) Detected (A) Not Detected, Indeterminate    Amphetamine (d-Amphetamine) Not Detected Not Detected, Indeterminate    Benzodiazepines (Nordiazepam) Not Detected Not Detected, Indeterminate    Tricyclic Antidepressants (Desipramine) Not Detected Not Detected, Indeterminate    Methadone Not Detected Not Detected, Indeterminate    Barbiturates (Butalbital) Not Detected Not Detected, Indeterminate    Oxycodone Not Detected Not Detected, Indeterminate    Buprenorphine Not Detected Not Detected, Indeterminate         I spent 29 minutes on the patient's visit today.  This included  preparation for the visit, face-to-face time with the patient and documentation following the visit.  It did not include teaching or procedure time.    Signed by: Peter E. Friedell, MD          Again, thank you for allowing me to participate in the care of your patient.        Sincerely,        Peter E. Friedell, MD    Electronically signed

## 2025-07-08 ENCOUNTER — THERAPY VISIT (OUTPATIENT)
Dept: OCCUPATIONAL THERAPY | Facility: CLINIC | Age: 85
End: 2025-07-08
Attending: FAMILY MEDICINE
Payer: COMMERCIAL

## 2025-07-08 DIAGNOSIS — M17.12 PRIMARY LOCALIZED OSTEOARTHROSIS OF LEFT LOWER LEG: ICD-10-CM

## 2025-07-08 DIAGNOSIS — M25.552 HIP PAIN, LEFT: ICD-10-CM

## 2025-07-08 DIAGNOSIS — G89.29 CHRONIC MIDLINE LOW BACK PAIN WITH LEFT-SIDED SCIATICA: ICD-10-CM

## 2025-07-08 DIAGNOSIS — Z78.9 IMPAIRED MOBILITY AND ADLS: Primary | ICD-10-CM

## 2025-07-08 DIAGNOSIS — Z74.09 IMPAIRED MOBILITY AND ADLS: Primary | ICD-10-CM

## 2025-07-08 DIAGNOSIS — M54.42 CHRONIC MIDLINE LOW BACK PAIN WITH LEFT-SIDED SCIATICA: ICD-10-CM

## 2025-07-08 PROCEDURE — 97542 WHEELCHAIR MNGMENT TRAINING: CPT | Mod: GO | Performed by: OCCUPATIONAL THERAPIST

## 2025-07-08 NOTE — PROGRESS NOTES
SEATING AND WHEELED MOBILITY ASSESSMENT    [facility]  Occupational Therapy   Date of service: July 8, 2025    Referring provider:   Sanjana Escobar MD      Order Date 5/22/25  Onset Date: 5/225/    Order Details:  anne    Funding:[employer] medicare    Others present at visit:  Sibling(s)    Medical diagnosis:   Spinal stenosis    Patient concerns/goals: Scooter    Living environment:  House    Living environment barriers:  3 stairs to enter (1 railing present)      Current assistance/living environment:  Requires partial assistance    Community Mobility:  Transportation: Car, via family    Current mobility equipment:  Standard cane(s)     Impairments:  Fatigue  Balance  Pain     Treatment diagnosis:  Impaired mobility  Impaired activities of daily living     Recommendations/Plan of care:  Occupational therapy intervention for  wheelchair management.    Goals:   Target date:   Patient, family and/or caregiver will verbalize/demonstrate understanding of compensatory methods /equipment to enhance functional independence and safety.    Educational assessment/barriers to learning:  No barriers noted    Treatment provided this date:   Wheelchair management, 15 minutes   Educated patient and sister on coverage for power wheelchair and scooters.  As patient only has Medicare she does not meet their requirements as her home is not accessible nor does she need her device in her home.  She desires a scooter or folding wheelchair for longer distances in order to manage back pain and limited mobility.  Educated that folding power wheelchairs are not important items and thus private pay.  Educated on heavy duty scooter options due to weight and options to take them apart versus fold.  Also educated patient that she would have 20% out-of-pocket co-pay due to her insurance.  Sister mentioned that she has a scooter that  patient can use and that they will just refurbish the batteries.  At this point no further eval and use of insurance dollars to be used.    Response to treatment/recommendations: Understanding    Goal attainment:  Met    Risks and benefits of evaluation/treatment have been explained.  Patient, family and/or caregiver are in agreement with Plan of Care.     Timed Code Treatment Minutes: 15  Total Treatment Time (sum of timed and untimed services): 15    Electronically signed by:  Batsheva AWAN/ALEX, ATP      Occupational Therapist, Assistive   [phone]      fax: [phone]      [email or web address], [address]  [address]  [place]   [address]  July 8, 2025

## 2025-07-21 ENCOUNTER — MYC REFILL (OUTPATIENT)
Dept: FAMILY MEDICINE | Facility: CLINIC | Age: 85
End: 2025-07-21
Payer: COMMERCIAL

## 2025-07-21 DIAGNOSIS — G89.29 CHRONIC MIDLINE LOW BACK PAIN WITH LEFT-SIDED SCIATICA: ICD-10-CM

## 2025-07-21 DIAGNOSIS — M54.42 CHRONIC MIDLINE LOW BACK PAIN WITH LEFT-SIDED SCIATICA: ICD-10-CM

## 2025-07-21 RX ORDER — HYDROCODONE BITARTRATE AND ACETAMINOPHEN 5; 325 MG/1; MG/1
1 TABLET ORAL 2 TIMES DAILY
Qty: 60 TABLET | Refills: 0 | Status: SHIPPED | OUTPATIENT
Start: 2025-07-21

## 2025-07-21 NOTE — TELEPHONE ENCOUNTER
Auto fail due to controlled substance. Outside of RN standing orders. Routing to PCP for review and refill. - MARICARMEN Salazar RN

## 2025-08-26 ENCOUNTER — MYC REFILL (OUTPATIENT)
Dept: FAMILY MEDICINE | Facility: CLINIC | Age: 85
End: 2025-08-26
Payer: COMMERCIAL

## 2025-08-26 DIAGNOSIS — G89.29 CHRONIC MIDLINE LOW BACK PAIN WITH LEFT-SIDED SCIATICA: ICD-10-CM

## 2025-08-26 DIAGNOSIS — M54.42 CHRONIC MIDLINE LOW BACK PAIN WITH LEFT-SIDED SCIATICA: ICD-10-CM

## 2025-08-26 RX ORDER — HYDROCODONE BITARTRATE AND ACETAMINOPHEN 5; 325 MG/1; MG/1
1 TABLET ORAL 2 TIMES DAILY
Qty: 60 TABLET | Refills: 0 | OUTPATIENT
Start: 2025-08-26